# Patient Record
Sex: FEMALE | Race: WHITE | NOT HISPANIC OR LATINO | URBAN - METROPOLITAN AREA
[De-identification: names, ages, dates, MRNs, and addresses within clinical notes are randomized per-mention and may not be internally consistent; named-entity substitution may affect disease eponyms.]

---

## 2020-01-02 ENCOUNTER — INPATIENT (INPATIENT)
Facility: HOSPITAL | Age: 80
LOS: 2 days | Discharge: HOME | DRG: 690 | End: 2020-01-05
Attending: HOSPITALIST | Admitting: STUDENT IN AN ORGANIZED HEALTH CARE EDUCATION/TRAINING PROGRAM
Payer: MEDICARE

## 2020-01-02 VITALS
WEIGHT: 169.98 LBS | RESPIRATION RATE: 18 BRPM | OXYGEN SATURATION: 97 % | SYSTOLIC BLOOD PRESSURE: 106 MMHG | HEART RATE: 77 BPM | TEMPERATURE: 98 F | HEIGHT: 60 IN | DIASTOLIC BLOOD PRESSURE: 67 MMHG

## 2020-01-02 DIAGNOSIS — R26.81 UNSTEADINESS ON FEET: ICD-10-CM

## 2020-01-02 DIAGNOSIS — Z02.9 ENCOUNTER FOR ADMINISTRATIVE EXAMINATIONS, UNSPECIFIED: ICD-10-CM

## 2020-01-02 DIAGNOSIS — R42 DIZZINESS AND GIDDINESS: ICD-10-CM

## 2020-01-02 DIAGNOSIS — I63.9 CEREBRAL INFARCTION, UNSPECIFIED: ICD-10-CM

## 2020-01-02 DIAGNOSIS — E11.9 TYPE 2 DIABETES MELLITUS WITHOUT COMPLICATIONS: ICD-10-CM

## 2020-01-02 DIAGNOSIS — N39.0 URINARY TRACT INFECTION, SITE NOT SPECIFIED: ICD-10-CM

## 2020-01-02 LAB
ALBUMIN SERPL ELPH-MCNC: 4.4 G/DL — SIGNIFICANT CHANGE UP (ref 3.3–5)
ALP SERPL-CCNC: 60 U/L — SIGNIFICANT CHANGE UP (ref 40–120)
ALT FLD-CCNC: 19 U/L — SIGNIFICANT CHANGE UP (ref 10–45)
ANION GAP SERPL CALC-SCNC: 15 MMOL/L — SIGNIFICANT CHANGE UP (ref 5–17)
APPEARANCE UR: ABNORMAL
APTT BLD: 24.7 SEC — LOW (ref 27.5–36.3)
AST SERPL-CCNC: 16 U/L — SIGNIFICANT CHANGE UP (ref 10–40)
BACTERIA # UR AUTO: ABNORMAL
BASOPHILS # BLD AUTO: 0.07 K/UL — SIGNIFICANT CHANGE UP (ref 0–0.2)
BASOPHILS NFR BLD AUTO: 0.4 % — SIGNIFICANT CHANGE UP (ref 0–2)
BILIRUB SERPL-MCNC: 0.4 MG/DL — SIGNIFICANT CHANGE UP (ref 0.2–1.2)
BILIRUB UR-MCNC: NEGATIVE — SIGNIFICANT CHANGE UP
BUN SERPL-MCNC: 18 MG/DL — SIGNIFICANT CHANGE UP (ref 7–23)
CALCIUM SERPL-MCNC: 9.6 MG/DL — SIGNIFICANT CHANGE UP (ref 8.4–10.5)
CHLORIDE SERPL-SCNC: 97 MMOL/L — SIGNIFICANT CHANGE UP (ref 96–108)
CO2 SERPL-SCNC: 24 MMOL/L — SIGNIFICANT CHANGE UP (ref 22–31)
COLOR SPEC: ABNORMAL
CREAT SERPL-MCNC: 0.59 MG/DL — SIGNIFICANT CHANGE UP (ref 0.5–1.3)
DIFF PNL FLD: ABNORMAL
EOSINOPHIL # BLD AUTO: 0.04 K/UL — SIGNIFICANT CHANGE UP (ref 0–0.5)
EOSINOPHIL NFR BLD AUTO: 0.3 % — SIGNIFICANT CHANGE UP (ref 0–6)
EPI CELLS # UR: SIGNIFICANT CHANGE UP
GLUCOSE SERPL-MCNC: 291 MG/DL — HIGH (ref 70–99)
GLUCOSE UR QL: ABNORMAL
HCT VFR BLD CALC: 42.5 % — SIGNIFICANT CHANGE UP (ref 34.5–45)
HGB BLD-MCNC: 13.6 G/DL — SIGNIFICANT CHANGE UP (ref 11.5–15.5)
IMM GRANULOCYTES NFR BLD AUTO: 0.6 % — SIGNIFICANT CHANGE UP (ref 0–1.5)
INR BLD: 1.1 RATIO — SIGNIFICANT CHANGE UP (ref 0.88–1.16)
KETONES UR-MCNC: NEGATIVE — SIGNIFICANT CHANGE UP
LEUKOCYTE ESTERASE UR-ACNC: ABNORMAL
LYMPHOCYTES # BLD AUTO: 12.6 % — LOW (ref 13–44)
LYMPHOCYTES # BLD AUTO: 2.01 K/UL — SIGNIFICANT CHANGE UP (ref 1–3.3)
MCHC RBC-ENTMCNC: 30.8 PG — SIGNIFICANT CHANGE UP (ref 27–34)
MCHC RBC-ENTMCNC: 32 GM/DL — SIGNIFICANT CHANGE UP (ref 32–36)
MCV RBC AUTO: 96.2 FL — SIGNIFICANT CHANGE UP (ref 80–100)
MONOCYTES # BLD AUTO: 0.32 K/UL — SIGNIFICANT CHANGE UP (ref 0–0.9)
MONOCYTES NFR BLD AUTO: 2 % — SIGNIFICANT CHANGE UP (ref 2–14)
NEUTROPHILS # BLD AUTO: 13.39 K/UL — HIGH (ref 1.8–7.4)
NEUTROPHILS NFR BLD AUTO: 84.1 % — HIGH (ref 43–77)
NITRITE UR-MCNC: NEGATIVE — SIGNIFICANT CHANGE UP
NRBC # BLD: 0 /100 WBCS — SIGNIFICANT CHANGE UP (ref 0–0)
PH UR: 6.5 — SIGNIFICANT CHANGE UP (ref 5–8)
PLATELET # BLD AUTO: 340 K/UL — SIGNIFICANT CHANGE UP (ref 150–400)
POTASSIUM SERPL-MCNC: 4.5 MMOL/L — SIGNIFICANT CHANGE UP (ref 3.5–5.3)
POTASSIUM SERPL-SCNC: 4.5 MMOL/L — SIGNIFICANT CHANGE UP (ref 3.5–5.3)
PROT SERPL-MCNC: 7.7 G/DL — SIGNIFICANT CHANGE UP (ref 6–8.3)
PROT UR-MCNC: ABNORMAL
PROTHROM AB SERPL-ACNC: 12.7 SEC — SIGNIFICANT CHANGE UP (ref 10–12.9)
RBC # BLD: 4.42 M/UL — SIGNIFICANT CHANGE UP (ref 3.8–5.2)
RBC # FLD: 13.6 % — SIGNIFICANT CHANGE UP (ref 10.3–14.5)
RBC CASTS # UR COMP ASSIST: >50 /HPF — SIGNIFICANT CHANGE UP (ref 0–4)
SODIUM SERPL-SCNC: 136 MMOL/L — SIGNIFICANT CHANGE UP (ref 135–145)
SP GR SPEC: 1.02 — SIGNIFICANT CHANGE UP (ref 1.01–1.02)
UROBILINOGEN FLD QL: NEGATIVE — SIGNIFICANT CHANGE UP
WBC # BLD: 15.93 K/UL — HIGH (ref 3.8–10.5)
WBC # FLD AUTO: 15.93 K/UL — HIGH (ref 3.8–10.5)
WBC UR QL: >50

## 2020-01-02 PROCEDURE — 70496 CT ANGIOGRAPHY HEAD: CPT | Mod: 26

## 2020-01-02 PROCEDURE — 70498 CT ANGIOGRAPHY NECK: CPT | Mod: 26

## 2020-01-02 PROCEDURE — 93010 ELECTROCARDIOGRAM REPORT: CPT

## 2020-01-02 PROCEDURE — 99284 EMERGENCY DEPT VISIT MOD MDM: CPT

## 2020-01-02 PROCEDURE — 99223 1ST HOSP IP/OBS HIGH 75: CPT

## 2020-01-02 RX ORDER — SODIUM CHLORIDE 9 MG/ML
1000 INJECTION, SOLUTION INTRAVENOUS
Refills: 0 | Status: DISCONTINUED | OUTPATIENT
Start: 2020-01-02 | End: 2020-01-05

## 2020-01-02 RX ORDER — INSULIN LISPRO 100/ML
VIAL (ML) SUBCUTANEOUS AT BEDTIME
Refills: 0 | Status: DISCONTINUED | OUTPATIENT
Start: 2020-01-02 | End: 2020-01-05

## 2020-01-02 RX ORDER — MECLIZINE HCL 12.5 MG
12.5 TABLET ORAL ONCE
Refills: 0 | Status: COMPLETED | OUTPATIENT
Start: 2020-01-02 | End: 2020-01-02

## 2020-01-02 RX ORDER — CEFPODOXIME PROXETIL 100 MG
100 TABLET ORAL EVERY 12 HOURS
Refills: 0 | Status: DISCONTINUED | OUTPATIENT
Start: 2020-01-02 | End: 2020-01-02

## 2020-01-02 RX ORDER — ASPIRIN/CALCIUM CARB/MAGNESIUM 324 MG
81 TABLET ORAL DAILY
Refills: 0 | Status: DISCONTINUED | OUTPATIENT
Start: 2020-01-02 | End: 2020-01-05

## 2020-01-02 RX ORDER — DEXTROSE 50 % IN WATER 50 %
12.5 SYRINGE (ML) INTRAVENOUS ONCE
Refills: 0 | Status: DISCONTINUED | OUTPATIENT
Start: 2020-01-02 | End: 2020-01-05

## 2020-01-02 RX ORDER — DEXTROSE 50 % IN WATER 50 %
25 SYRINGE (ML) INTRAVENOUS ONCE
Refills: 0 | Status: DISCONTINUED | OUTPATIENT
Start: 2020-01-02 | End: 2020-01-05

## 2020-01-02 RX ORDER — ACETAMINOPHEN 500 MG
650 TABLET ORAL ONCE
Refills: 0 | Status: COMPLETED | OUTPATIENT
Start: 2020-01-02 | End: 2020-01-02

## 2020-01-02 RX ORDER — DEXTROSE 50 % IN WATER 50 %
15 SYRINGE (ML) INTRAVENOUS ONCE
Refills: 0 | Status: DISCONTINUED | OUTPATIENT
Start: 2020-01-02 | End: 2020-01-05

## 2020-01-02 RX ORDER — MECLIZINE HCL 12.5 MG
25 TABLET ORAL EVERY 6 HOURS
Refills: 0 | Status: DISCONTINUED | OUTPATIENT
Start: 2020-01-02 | End: 2020-01-05

## 2020-01-02 RX ORDER — CEFTRIAXONE 500 MG/1
1000 INJECTION, POWDER, FOR SOLUTION INTRAMUSCULAR; INTRAVENOUS EVERY 24 HOURS
Refills: 0 | Status: DISCONTINUED | OUTPATIENT
Start: 2020-01-03 | End: 2020-01-05

## 2020-01-02 RX ORDER — ATORVASTATIN CALCIUM 80 MG/1
40 TABLET, FILM COATED ORAL AT BEDTIME
Refills: 0 | Status: DISCONTINUED | OUTPATIENT
Start: 2020-01-02 | End: 2020-01-05

## 2020-01-02 RX ORDER — ENOXAPARIN SODIUM 100 MG/ML
40 INJECTION SUBCUTANEOUS DAILY
Refills: 0 | Status: DISCONTINUED | OUTPATIENT
Start: 2020-01-02 | End: 2020-01-05

## 2020-01-02 RX ORDER — GLUCAGON INJECTION, SOLUTION 0.5 MG/.1ML
1 INJECTION, SOLUTION SUBCUTANEOUS ONCE
Refills: 0 | Status: DISCONTINUED | OUTPATIENT
Start: 2020-01-02 | End: 2020-01-05

## 2020-01-02 RX ORDER — ONDANSETRON 8 MG/1
4 TABLET, FILM COATED ORAL ONCE
Refills: 0 | Status: COMPLETED | OUTPATIENT
Start: 2020-01-02 | End: 2020-01-02

## 2020-01-02 RX ORDER — CEFTRIAXONE 500 MG/1
1000 INJECTION, POWDER, FOR SOLUTION INTRAMUSCULAR; INTRAVENOUS ONCE
Refills: 0 | Status: COMPLETED | OUTPATIENT
Start: 2020-01-02 | End: 2020-01-02

## 2020-01-02 RX ORDER — INSULIN LISPRO 100/ML
VIAL (ML) SUBCUTANEOUS
Refills: 0 | Status: DISCONTINUED | OUTPATIENT
Start: 2020-01-02 | End: 2020-01-05

## 2020-01-02 RX ORDER — ACETAMINOPHEN 500 MG
650 TABLET ORAL EVERY 6 HOURS
Refills: 0 | Status: DISCONTINUED | OUTPATIENT
Start: 2020-01-02 | End: 2020-01-05

## 2020-01-02 RX ADMIN — ONDANSETRON 4 MILLIGRAM(S): 8 TABLET, FILM COATED ORAL at 15:32

## 2020-01-02 RX ADMIN — CEFTRIAXONE 100 MILLIGRAM(S): 500 INJECTION, POWDER, FOR SOLUTION INTRAMUSCULAR; INTRAVENOUS at 20:19

## 2020-01-02 RX ADMIN — Medication 12.5 MILLIGRAM(S): at 15:32

## 2020-01-02 RX ADMIN — Medication 100 MILLIGRAM(S): at 18:27

## 2020-01-02 NOTE — H&P ADULT - NSHPPHYSICALEXAM_GEN_ALL_CORE
T(C): 36.6 (01-02-20 @ 18:29), Max: 36.6 (01-02-20 @ 18:29)  HR: 77 (01-02-20 @ 18:29) (77 - 82)  BP: 128/67 (01-02-20 @ 18:29) (106/67 - 128/67)  RR: 16 (01-02-20 @ 18:29) (16 - 18)  SpO2: 96% (01-02-20 @ 18:29) (96% - 97%)    Gen: (fe)male in NAD, appears comfortable, no diaphoresis  HEENT: NCAT, MMM, neck soft and supple  CV: +S1/S2, no m/r/g  Resp: CTAB, no w/r/r  GI: normoactive BS, soft, NTND, no rebounding/guarding  Ext: No LE edema, extremities appear warm and well perfused   Neuro: AOx3, no focal deficits, CNII-XII grossly intact  Psych: No SI/HI/AVH, appropriate affect  Skin: no petechiae, ecchymosis or maculopapular rash noted T(C): 36.6 (01-02-20 @ 18:29), Max: 36.6 (01-02-20 @ 18:29)  HR: 77 (01-02-20 @ 18:29) (77 - 82)  BP: 128/67 (01-02-20 @ 18:29) (106/67 - 128/67)  RR: 16 (01-02-20 @ 18:29) (16 - 18)  SpO2: 96% (01-02-20 @ 18:29) (96% - 97%)    Gen: female in NAD, appears comfortable, no diaphoresis  HEENT: NCAT, MMM, neck soft and supple  CV: +S1/S2, no m/r/g  Resp: CTAB, no w/r/r  GI: normoactive BS, soft, NTND, no rebounding/guarding  Ext: No LE edema, extremities appear warm and well perfused   Neuro: AOx3, no focal deficits, CNII-XII grossly intact, strength 5/5 throughout, FTN intact, HTS intact  Psych: No SI/HI/AVH, appropriate affect  Skin: no petechiae, ecchymosis or maculopapular rash noted T(C): 36.6 (01-02-20 @ 18:29), Max: 36.6 (01-02-20 @ 18:29)  HR: 77 (01-02-20 @ 18:29) (77 - 82)  BP: 128/67 (01-02-20 @ 18:29) (106/67 - 128/67)  RR: 16 (01-02-20 @ 18:29) (16 - 18)  SpO2: 96% (01-02-20 @ 18:29) (96% - 97%)    Gen: female in NAD, appears comfortable, no diaphoresis  HEENT: NCAT, MMM, neck soft and supple  CV: +S1/S2, no m/r/g  Resp: CTAB, no w/r/r  GI: normoactive BS, soft, NTND, no rebounding/guarding  Ext: No LE edema, extremities appear warm and well perfused   Neuro: AOx3, no focal deficits, CNII-XII grossly intact, strength 5/5 throughout, FTN intact, HTS intact; Ric-Hallpike negative  Psych: No SI/HI/AVH, appropriate affect  Skin: no petechiae, ecchymosis or maculopapular rash noted

## 2020-01-02 NOTE — ED ADULT NURSE NOTE - OBJECTIVE STATEMENT
79y old female PMH Diabetes, Vertigo, HLD walk in from triage c/o dizziness. Patient states last night she was walking to bathroom when she felt like someone pushed her, she went against the wall and slide to the floor, denies LOC, head injury, then while on the floor patient became dizzy and unable to stand up, this morning woke up with right sided HA. Patient denies numbness/tingling, weakness, vomiting, fever/chills, CP, SOB, blurred vision/visual changes. Patient is well appearing, A&Ox3, PERRL, EOM intact, no drift present, sensory intact, equal strength present b/t in all extremities.

## 2020-01-02 NOTE — ED ADULT NURSE REASSESSMENT NOTE - NS ED NURSE REASSESS COMMENT FT1
Patient lying in bed with granddaughter at bedside, denies current dizziness, aware of plan of care, antibiotic administered as per MD order.

## 2020-01-02 NOTE — CONSULT NOTE ADULT - SUBJECTIVE AND OBJECTIVE BOX
HPI: 78yo woman history of HLD, DMII, ?meningioma resection  presenting to [facility] ED after a fall with Neurology consult for age indeterminant infarct on CT and patient's c/o unsteady gait. Patient and daughter give history. Patient reports that 11:30p 20 she was walking to bathroom when she suddenly fell backwards, hit her head against the door, slid down, had trouble getting up, and reported worsening of her gait. Of note, she reports that when she was in [place] in 2019, she had sudden onset room spinning, had a CT done in [place], and was told she had some ?hypodensity in the back of her head and received physical therapy while there. Since 2019, she is noted to require holding onto the wall intermittently for maintaining balance, or holding hand of family member to walk around, and does not walk around as much anymore because of it. During most recent episode, patient denies headaches, visual changes, slurred speech, room spinning dizziness, trouble swallowing, ringing in her ears, hearing loss, weakness, numbness/tingling, loss of consciousness, urinary incontinence. Denies recent changes in medications.    Meds:  Metformin 500mg daily  glipizide 5mg daily  lipitor 10mg daily  does not take aspirin    (Stroke only)  NIHSS: 0  MRS: 4    REVIEW OF SYSTEMS  General: endorses having had a cold 1 week ago, denies n/v/d, sob/cp, dysuria	    A 10-system ROS was performed and is negative except for those items noted above and/or in the HPI.    PAST MEDICAL & SURGICAL HISTORY:  Diabetes  Hyperlipidemia  Headache: undiagnosed for years- exacerbation treated with Prednisone  Nephrolithiasis  Brain Tumor (Benign)  Brain Tumor (Benign)    FAMILY HISTORY:  Father had CAD    SOCIAL HISTORY:   T/E/D: remote history of smoking quit in , no h/o drinking alcohol    MEDICATIONS (HOME):  Home Medications:  atorvastatin:  orally  (2015 11:47)  Janumet:  orally  (2015 11:47)    MEDICATIONS  (STANDING):    MEDICATIONS  (PRN):    ALLERGIES/INTOLERANCES:  Allergies  penicillin (Unknown)  penicillins (Rash)    VITALS & EXAMINATION:  Vital Signs Last 24 Hrs  T(C): 36.6 (2020 18:29), Max: 36.6 (2020 18:29)  T(F): 97.8 (2020 18:29), Max: 97.8 (2020 18:29)  HR: 77 (2020 18:29) (77 - 82)  BP: 128/67 (2020 18:29) (106/67 - 128/67)  BP(mean): --  RR: 16 (2020 18:29) (16 - 18)  SpO2: 96% (2020 18:29) (96% - 97%)    Neurological (>12):  MS: Awake, alert, oriented to person, place, situation, time. Normal affect. Follows all commands.    Language: Speech is clear, fluent with good repetition & comprehension    CNs: PERRLA (R = 3mm, L = 3mm). VFF. EOMI no nystagmus, no diplopia. V1-3 intact to LT/pinprick, well developed masseter muscles b/l. No facial asymmetry b/l, full eye closure strength b/l. Hearing grossly normal (rubbing fingers) b/l. Symmetric palate elevation in midline. Gag reflex deferred. Head turning & shoulder shrug intact b/l. Tongue midline, normal movements, no atrophy.     No vertical nystagmus    Motor: Normal muscle bulk & tone. No noticeable tremor. No pronator nor downward drift.	     Sensation: Intact to LT b/l throughout.     Cortical: Extinction on DSS (neglect): none    Coordination: intact rapid-alt movements. No dysmetria to FTN/HTS    Gait: patient can stand up on her own from sitting position, however she is noted to have mildly wide based gait and requires hand holding to take steps forward    LABORATORY:  CBC                       13.6   15.93 )-----------( 340      ( 2020 15:43 )             42.5     Chem 01-02    136  |  97  |  18  ----------------------------<  291<H>  4.5   |  24  |  0.59    Ca    9.6      2020 15:43    TPro  7.7  /  Alb  4.4  /  TBili  0.4  /  DBili  x   /  AST  16  /  ALT  19  /  AlkPhos  60  01-02    LFTs LIVER FUNCTIONS - ( 2020 15:43 )  Alb: 4.4 g/dL / Pro: 7.7 g/dL / ALK PHOS: 60 U/L / ALT: 19 U/L / AST: 16 U/L / GGT: x           Coagulopathy PT/INR - ( 2020 15:43 )   PT: 12.7 sec;   INR: 1.10 ratio         PTT - ( 2020 15:43 )  PTT:24.7 sec  Lipid Panel   A1c   Cardiac enzymes     U/A Urinalysis Basic - ( 2020 16:06 )    Color: Light Orange / Appearance: Turbid / S.019 / pH: x  Gluc: x / Ketone: Negative  / Bili: Negative / Urobili: Negative   Blood: x / Protein: 30 mg/dL / Nitrite: Negative   Leuk Esterase: Large / RBC: >50 /hpf / WBC >50   Sq Epi: x / Non Sq Epi: Few / Bacteria: Many    STUDIES & IMAGING:  Studies (EKG, EEG, EMG, etc):     Radiology (XR, CT, MR, U/S, TTE/SONDRA):    < from: CT Head No Cont (20 @ 17:44) >  IMPRESSION:  CT brain: Subcentimeter hypodensities are present within the right cerebellum and are concerning for age indeterminant lacunar infarcts. No acute hemorrhage.    CTA neck: No stenosis. No dissection.    CTA brain: No stenosis or aneurysm.    < end of copied text > HPI: 78yo woman history of HLD, DMII, ?meningioma resection  presenting to [facility] ED after a fall with Neurology consult for age indeterminant infarct on CT and patient's c/o unsteady gait. Patient and daughter give history. Patient reports that 11:30p 20 she was walking to bathroom when she suddenly fell backwards, hit her head against the door, slid down, had trouble getting up, and reported worsening of her gait. Of note, she reports that when she was in [place] in 2019, she had sudden onset room spinning, had a CT done in [place], and was told she had some ?hypodensity in the back of her head and received physical therapy while there. Since 2019, she is noted to require holding onto the wall intermittently for maintaining balance, or holding hand of family member to walk around, and does not walk around as much anymore because of it. During most recent episode, patient denies headaches, visual changes, slurred speech, room spinning dizziness, trouble swallowing, ringing in her ears, hearing loss, weakness, numbness/tingling, loss of consciousness, urinary incontinence. Denies recent changes in medications.    Patient also notes that she has had pain behind her right eye for the past week with no associated visual changes, does not hurt with eye movements, no temporal tenderness.     Meds:  Metformin 500mg daily  glipizide 5mg daily  lipitor 10mg daily  does not take aspirin    (Stroke only)  NIHSS: 0  MRS: 4    REVIEW OF SYSTEMS  General: endorses having had a cold 1 week ago, denies n/v/d, sob/cp, dysuria	    A 10-system ROS was performed and is negative except for those items noted above and/or in the HPI.    PAST MEDICAL & SURGICAL HISTORY:  Diabetes  Hyperlipidemia  Headache: undiagnosed for years- exacerbation treated with Prednisone  Nephrolithiasis  Brain Tumor (Benign)  Brain Tumor (Benign)    FAMILY HISTORY:  Father had CAD    SOCIAL HISTORY:   T/E/D: remote history of smoking quit in , no h/o drinking alcohol    MEDICATIONS (HOME):  Home Medications:  atorvastatin:  orally  (2015 11:47)  Janumet:  orally  (2015 11:47)    MEDICATIONS  (STANDING):    MEDICATIONS  (PRN):    ALLERGIES/INTOLERANCES:  Allergies  penicillin (Unknown)  penicillins (Rash)    VITALS & EXAMINATION:  Vital Signs Last 24 Hrs  T(C): 36.6 (2020 18:29), Max: 36.6 (2020 18:29)  T(F): 97.8 (2020 18:29), Max: 97.8 (2020 18:29)  HR: 77 (2020 18:29) (77 - 82)  BP: 128/67 (2020 18:29) (106/67 - 128/67)  BP(mean): --  RR: 16 (2020 18:29) (16 - 18)  SpO2: 96% (2020 18:29) (96% - 97%)    Neurological (>12):  MS: Awake, alert, oriented to person, place, situation, time. Normal affect. Follows all commands.    Language: Speech is clear, fluent with good repetition & comprehension    CNs: PERRLA (R = 3mm, L = 3mm). VFF. EOMI no nystagmus, no diplopia. V1-3 intact to LT/pinprick, well developed masseter muscles b/l. No facial asymmetry b/l, full eye closure strength b/l. Hearing grossly normal (rubbing fingers) b/l. Symmetric palate elevation in midline. Gag reflex deferred. Head turning & shoulder shrug intact b/l. Tongue midline, normal movements, no atrophy.     No vertical nystagmus    Motor: Normal muscle bulk & tone. No noticeable tremor. No pronator nor downward drift.	     Sensation: Intact to LT b/l throughout.     Cortical: Extinction on DSS (neglect): none    Coordination: intact rapid-alt movements. No dysmetria to FTN/HTS    Gait: patient can stand up on her own from sitting position, however she is noted to have mildly wide based gait and requires hand holding to take steps forward    LABORATORY:  CBC                       13.6   15.93 )-----------( 340      ( 2020 15:43 )             42.5     Chem 01-02    136  |  97  |  18  ----------------------------<  291<H>  4.5   |  24  |  0.59    Ca    9.6      2020 15:43    TPro  7.7  /  Alb  4.4  /  TBili  0.4  /  DBili  x   /  AST  16  /  ALT  19  /  AlkPhos  60  -    LFTs LIVER FUNCTIONS - ( 2020 15:43 )  Alb: 4.4 g/dL / Pro: 7.7 g/dL / ALK PHOS: 60 U/L / ALT: 19 U/L / AST: 16 U/L / GGT: x           Coagulopathy PT/INR - ( 2020 15:43 )   PT: 12.7 sec;   INR: 1.10 ratio         PTT - ( 2020 15:43 )  PTT:24.7 sec  Lipid Panel   A1c   Cardiac enzymes     U/A Urinalysis Basic - ( 2020 16:06 )    Color: Light Orange / Appearance: Turbid / S.019 / pH: x  Gluc: x / Ketone: Negative  / Bili: Negative / Urobili: Negative   Blood: x / Protein: 30 mg/dL / Nitrite: Negative   Leuk Esterase: Large / RBC: >50 /hpf / WBC >50   Sq Epi: x / Non Sq Epi: Few / Bacteria: Many    STUDIES & IMAGING:  Studies (EKG, EEG, EMG, etc):     Radiology (XR, CT, MR, U/S, TTE/SONDRA):    < from: CT Head No Cont (20 @ 17:44) >  IMPRESSION:  CT brain: Subcentimeter hypodensities are present within the right cerebellum and are concerning for age indeterminant lacunar infarcts. No acute hemorrhage.    CTA neck: No stenosis. No dissection.    CTA brain: No stenosis or aneurysm.    < end of copied text >

## 2020-01-02 NOTE — H&P ADULT - HISTORY OF PRESENT ILLNESS
79F with PMHx of T2DM, meningioma (post-resection decades prior), prior CVA, and BPPV presents with one episode of vertigo one day prior to admission. Patient states approximately one day prior she was walking to the bathroom when she felt a sudden onset of vertigo which she described as room spinning. She stated that it lasted approximately 15 minutes and self-resolved. When it occurred she denied sudden head movement. She lowered herself onto the floor with her back against the wall. She felt slightly nauseous during the episode, but denied any vomiting. She denies changes in hearing or ringing of her ears. Since then she states that her gait as been unsteady and wide-based with the constant need to brace herself despite not having active vertigo. She states that she was diagnosed with vertigo several months prior in [place] and treated with a maneuver. Per discussion with patient and her descriptions it seems as if she was diagnosed with BPPV and had the Ric-Epperson [place] maneuver performed with treatment being two sessions of the Epley maneuver. At that time patient also diagnosed with "something in the brain," and what she describes sounds like a stroke for which she was told she needed follow up. She has not had an MRI in the interim. Patient denies nasal congestion, rhinorrhea, cough, or shortness  of breath. She has been having dysuria for several days. She denies polyuria or abdominal pain. She has been taking antibiotics she received from [facility], but unsure of the exact class of antibiotics. She states that her dysuria has not improved despite taking it for several days. She is presenting today because she is concerned about the vertigo. Aside from unsteadiness on feet, patient denies any active vertigo currently. While in the ED patient had CT head which showed sub-centimeter hypodensity in right cerebellum. Neurology was consulted, likely chronic and believe patient may have recrudescence of prior stroke given UTI.

## 2020-01-02 NOTE — H&P ADULT - PROBLEM SELECTOR PLAN 2
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 peripheral vertigo  -Physical therapy consult  -Treat UTI  -Defer inpatient work up of stroke (TTE and MRI brain w/o contrast) pending clinical course; EKG showing NSR, no telemetry for now, consider outpatient cardiac monitor  -Patient denies headache or changes in vision, suspicion for temporal arteritis is low, defer ESR/CRP  -Ambulate with assistance -Possible recrudescence of old cerebellar stroke vs. peripheral vertigo  -Physical therapy consult  -Treat UTI  -Defer inpatient work up of stroke (TTE and MRI brain w/o contrast) pending clinical course; EKG showing NSR, no telemetry for now, consider outpatient cardiac monitor, no LVO on CTA H&N  -Patient denies headache or changes in vision, suspicion for temporal arteritis is low, defer ESR/CRP  -Ambulate with assistance

## 2020-01-02 NOTE — ED PROVIDER NOTE - ATTENDING CONTRIBUTION TO CARE
79 YOF PMH DM, vertigo presents to ED c/o dizziness and unsteadiness on her feet since last night. Was ambulating to bathroom last night when she felt off balance and stumbled hitting door. She reports lowering herself to ground feeling sensation of room spinning. Did not have LOC. Able to get back into bed on her own. reports history of similar symptoms for which she was treated with vestibular PT back in [facility] with resolution of symptoms. Here still with mild headache and dizziness upon standing. Denies use of blood thinners. Denies fevers, chills, n/v, blurry vision, cp, sob, abd pain, numbness, tingling.   AP - nonfocal neuro exam. ekg nonischemic. eval for infectious etiology. lower concern for posterior stroke. possible orthostatic or vasovagal. CTH r/o traumatic injury. reassess

## 2020-01-02 NOTE — ED PROVIDER NOTE - OBJECTIVE STATEMENT
79 year old female with pmhx DM, prior vertigo presents to ED c/o dizziness and unsteadiness on her feet since last night. Patient reports she was attempting to walk to bathroom she suddenly felt off-balance and stumbled stepping backwards until her back hit the wall. Patient states she slid down lowering herself to the floor and began to feel dizzy describing the room was spinning. Patient had trouble getting up and states it took about an hour to stand up on her own. Since has had intermittent sensation of room spinning assocated w/ nausea. Symptoms worsen when pt lays flat and closes her eyes. She states it feels similar to vertigo she experienced in [place] which she was treated with vestibular pt. She endorses mild left sided headache. Denies head injury, loc, chest pain, sob, abd pain, v/d.

## 2020-01-02 NOTE — H&P ADULT - PROBLEM SELECTOR PLAN 3
-Start ASA81 for secondary prevention  -Increase Lipitor to high dose  -Send A1c and Lipid Profile for risk stratification -Start ASA81 for secondary prevention  -Increase Lipitor to high dose  -Send A1c and Lipid Profile for risk stratification  -Defer further inpatient stroke work up pending clinical course

## 2020-01-02 NOTE — ED PROVIDER NOTE - PHYSICAL EXAMINATION
CONSTITUTIONAL: Patient is awake, alert and oriented x 3. Patient is well appearing and in no acute distress.  HEAD: NCAT,   EYES: PERRL b/l, EOMI, No nystagmus   ENT:Airway patent, Nasal mucosa clear. Mouth with normal mucosa. Throat has no vesicles, no oropharyngeal exudates and uvula is midline.  LUNGS: CTA B/L,  HEART: RRR.+S1S2 no murmurs,   ABDOMEN: Soft nd/nt+bs no rebound or guarding.   EXTREMITY: no edema or calf tenderness b/l, FROM upper and lower ext b/l  SKIN: with no rash or lesions.   NEURO: Cn3-12 grossly intact. Strength5/5UE/LE.NmlSensation. Normal finger to nose. No drift

## 2020-01-02 NOTE — H&P ADULT - PROBLEM SELECTOR PLAN 6
Transitions of Care Status:  1.  Name of PCP: Carlos Fuentes  2.  PCP Contacted on Admission: [ ] Y    [ ] N    3.  PCP contacted at Discharge: [ ] Y    [ ] N    [ ] N/A  4.  Post-Discharge Appointment Date and Location:  5.  Summary of Handoff given to PCP:

## 2020-01-02 NOTE — H&P ADULT - NSICDXPASTMEDICALHX_GEN_ALL_CORE_FT
PAST MEDICAL HISTORY:  Brain Tumor (Benign)     Diabetes     Headache undiagnosed for years- exacerbation treated with Prednisone    Hyperlipidemia     Ischemic stroke     Nephrolithiasis

## 2020-01-02 NOTE — H&P ADULT - PROBLEM SELECTOR PLAN 1
Transitions of Care Status:  1.  Name of PCP: Carlos Fuentes  2.  PCP Contacted on Admission: [ ] Y    [ ] N    3.  PCP contacted at Discharge: [ ] Y    [ ] N    [ ] N/A  4.  Post-Discharge Appointment Date and Location:  5.  Summary of Handoff given to PCP: -Patient taking unknown antibiotic class she got from [facility] several months prior without improvement, will try to find out what it is  -Continue with Ceftriaxone (patient with PCN allergy which is rash, tolerated CTX in ED)  -Collect urine culture if possible

## 2020-01-02 NOTE — CONSULT NOTE ADULT - ASSESSMENT
Assessment:  78yo woman history of HLD, DMII, ?meningioma resection 1978 presenting to [facility] ED after a fall with Neurology consult for age indeterminant infarct on CT and patient's c/o unsteady gait. Neurological examination demonstrates mildly wide based gait and difficulty with balance which has been progressive. CTH shows age indeterminant R cerebellar infarct.     Impression: recrudescence of known cerebellar infarct in setting of patient having recent cold    Recommendations:  admit to medicine  [ ] MRI brain w/o contrast  [ ] PT/OT  [ ] speech/swallow  [ ] asp 81mg daily  [ ] atorvastatin 80mg daily to titrate for LDL < 70  [ ] no further stroke workup at this time needed inpatient  [ ] no need for permissive HTN    We will follow closely    -Management & disposition to be discussed with Dr. Pineda Assessment:  78yo woman history of HLD, DMII, ?meningioma resection 1978 presenting to [facility] ED after a fall with Neurology consult for age indeterminant infarct on CT and patient's c/o unsteady gait. Neurological examination demonstrates mildly wide based gait and difficulty with balance which has been progressive. CTH shows age indeterminant R cerebellar infarct.     Impression: recrudescence of known cerebellar infarct in setting of patient having recent cold and likely UTI per UA    Recommendations:  admit to medicine  [ ] MRI brain w/o contrast which can be done as outpatient  [ ] PT/OT  [ ] speech/swallow  [ ] asp 81mg daily  [ ] atorvastatin 80mg daily to titrate for LDL < 70  [ ] no further stroke workup at this time needed inpatient  [ ] no need for permissive HTN  [ ] noted elevated white count and + UA leuk esterase - mgmt per primary team    We will follow closely    -Management & disposition to be discussed with Dr. Pineda Assessment:  80yo woman history of HLD, DMII, ?meningioma resection 1978 presenting to [facility] ED after a fall with Neurology consult for age indeterminant infarct on CT and patient's c/o unsteady gait. Neurological examination demonstrates mildly wide based gait and difficulty with balance which has been progressive. CTH shows age indeterminant R cerebellar infarct.     Impression: recrudescence of known cerebellar infarct in setting of patient having recent cold and likely UTI per UA  rule out temporal arteritis though low suspicion, given ?improving retrobulbar eye pain    Recommendations:  admit to medicine  [ ] please add on ESR & CRP  [ ] RVP  [ ] MRI brain w/o contrast which can be done as outpatient  [ ] PT/OT  [ ] speech/swallow  [ ] asp 81mg daily  [ ] atorvastatin 80mg daily to titrate for LDL < 70  [ ] no further stroke workup at this time needed inpatient  [ ] no need for permissive HTN  [ ] noted elevated white count and + UA leuk esterase - mgmt per primary team  [ ] optho consult     We will follow closely    -Management & disposition to be discussed with Dr. Pineda

## 2020-01-02 NOTE — H&P ADULT - ASSESSMENT
79F with PMHx of T2DM, meningioma (post-resection decades prior), prior CVA, and BPPV presents with one episode of vertigo one day prior to admission. Patient also having dysuria for several days not improving on antibiotics she has been taking on her own which she received from [facility] months prior. CT head showing sub-centimeter hypodensity of right cerebellum (age indeterminate). Patient with grossly positive UA (>50 WBC, +Bact, +LE). Exam notable for intact neurological exam with [place]-Hallpike maneuver negative (though limited by patient positioning). Given patient's history her subcentimeter hypodensity in cerebellum likely chronic. She has a UTI by history and supported by UA. There is a possibility that she is experiencing a recrudescence of her prior stroke. Patient explanation of her vertigo sounds typical for peripheral vertigo (consistent with her prior BPPV) with low suspicion for central vertigo at this time. Patient admitted for treatment of UTI.

## 2020-01-02 NOTE — H&P ADULT - PROBLEM SELECTOR PLAN 4
-Possibly BPPV based on patient description of [place]-Hallpike and Epley maneuver in [place]; Ric-Hallpike negative when performed by me, but limited by logistics of room  -Low suspicion for central vertigo, defer MRI brain  -Low suspicion for viral infection given lack of symptoms, defer RVP  -Meclizine PRN

## 2020-01-02 NOTE — PATIENT PROFILE ADULT - STATED REASON FOR ADMISSION
" Because I felt that something was wrong, I couldn't hold myself and I had to use 2 hands to stay up"

## 2020-01-02 NOTE — H&P ADULT - NSHPREVIEWOFSYSTEMS_GEN_ALL_CORE
Gen: no changes in weight, fatigue, night sweats, appetite, or fever  Eyes: no changes in vision, diplopia, or floaters  ENT: no epistaxis, sinus pain, gingival bleeding, odynophagia or dysphagia  CV: no CP, SOB, PND, orthopnea, LOC, or palpitations  Resp: no cough, wheezing, or hemoptysis  GI: no abdominal pain, dyspepsia, nausea, vomiting, diarrhea, constipation, hematemesis, hematochezia, or melena  : no dysuria, polyuria, or hematuria  MSK: no arthralgias or joint swelling   Neuro: no gross sensory changes, numbness, focal deficits  Psych: no depression, changes in sleep, changes in concentration, or lack of energy  Heme/Onc: no purpura, petechiae or night sweats  Skin: no pruritus, hair loss or skin lesions  All: no photosensitivity, no complaints of anaphylaxis (SOB, throat swelling) Gen: no changes in weight, fatigue, night sweats, appetite, or fever  Eyes: no changes in vision, diplopia, or floaters  ENT: no epistaxis, sinus pain, gingival bleeding, odynophagia or dysphagia  CV: no CP, SOB, PND, orthopnea, LOC, or palpitations  Resp: no cough, wheezing, or hemoptysis  GI: no abdominal pain, dyspepsia, nausea, vomiting, diarrhea, constipation, hematemesis, hematochezia, or melena  : +dysuria, no hematuria  MSK: no arthralgias or joint swelling   Neuro: no gross sensory changes, numbness, focal deficits; +vertigo  Psych: no depression, changes in sleep, changes in concentration, or lack of energy  Heme/Onc: no purpura, petechiae or night sweats  Skin: no pruritus, hair loss or skin lesions  All: no photosensitivity, no complaints of anaphylaxis (SOB, throat swelling)

## 2020-01-03 ENCOUNTER — TRANSCRIPTION ENCOUNTER (OUTPATIENT)
Age: 80
End: 2020-01-03

## 2020-01-03 LAB
ANION GAP SERPL CALC-SCNC: 14 MMOL/L — SIGNIFICANT CHANGE UP (ref 5–17)
BUN SERPL-MCNC: 16 MG/DL — SIGNIFICANT CHANGE UP (ref 7–23)
CALCIUM SERPL-MCNC: 9.9 MG/DL — SIGNIFICANT CHANGE UP (ref 8.4–10.5)
CHLORIDE SERPL-SCNC: 101 MMOL/L — SIGNIFICANT CHANGE UP (ref 96–108)
CHOLEST SERPL-MCNC: 203 MG/DL — HIGH (ref 10–199)
CO2 SERPL-SCNC: 26 MMOL/L — SIGNIFICANT CHANGE UP (ref 22–31)
CREAT SERPL-MCNC: 0.65 MG/DL — SIGNIFICANT CHANGE UP (ref 0.5–1.3)
GLUCOSE BLDC GLUCOMTR-MCNC: 147 MG/DL — HIGH (ref 70–99)
GLUCOSE BLDC GLUCOMTR-MCNC: 160 MG/DL — HIGH (ref 70–99)
GLUCOSE BLDC GLUCOMTR-MCNC: 231 MG/DL — HIGH (ref 70–99)
GLUCOSE SERPL-MCNC: 96 MG/DL — SIGNIFICANT CHANGE UP (ref 70–99)
HBA1C BLD-MCNC: 6.9 % — HIGH (ref 4–5.6)
HCT VFR BLD CALC: 40.6 % — SIGNIFICANT CHANGE UP (ref 34.5–45)
HDLC SERPL-MCNC: 71 MG/DL — SIGNIFICANT CHANGE UP
HGB BLD-MCNC: 12.7 G/DL — SIGNIFICANT CHANGE UP (ref 11.5–15.5)
LIPID PNL WITH DIRECT LDL SERPL: 113 MG/DL — HIGH
MCHC RBC-ENTMCNC: 30.2 PG — SIGNIFICANT CHANGE UP (ref 27–34)
MCHC RBC-ENTMCNC: 31.3 GM/DL — LOW (ref 32–36)
MCV RBC AUTO: 96.7 FL — SIGNIFICANT CHANGE UP (ref 80–100)
NRBC # BLD: 0 /100 WBCS — SIGNIFICANT CHANGE UP (ref 0–0)
PLATELET # BLD AUTO: 335 K/UL — SIGNIFICANT CHANGE UP (ref 150–400)
POTASSIUM SERPL-MCNC: 3.9 MMOL/L — SIGNIFICANT CHANGE UP (ref 3.5–5.3)
POTASSIUM SERPL-SCNC: 3.9 MMOL/L — SIGNIFICANT CHANGE UP (ref 3.5–5.3)
RBC # BLD: 4.2 M/UL — SIGNIFICANT CHANGE UP (ref 3.8–5.2)
RBC # FLD: 13.7 % — SIGNIFICANT CHANGE UP (ref 10.3–14.5)
SODIUM SERPL-SCNC: 141 MMOL/L — SIGNIFICANT CHANGE UP (ref 135–145)
TOTAL CHOLESTEROL/HDL RATIO MEASUREMENT: 2.9 RATIO — LOW (ref 3.3–7.1)
TRIGL SERPL-MCNC: 93 MG/DL — SIGNIFICANT CHANGE UP (ref 10–149)
WBC # BLD: 15.21 K/UL — HIGH (ref 3.8–10.5)
WBC # FLD AUTO: 15.21 K/UL — HIGH (ref 3.8–10.5)

## 2020-01-03 PROCEDURE — 70551 MRI BRAIN STEM W/O DYE: CPT | Mod: 26

## 2020-01-03 PROCEDURE — 99233 SBSQ HOSP IP/OBS HIGH 50: CPT | Mod: GC

## 2020-01-03 PROCEDURE — 71045 X-RAY EXAM CHEST 1 VIEW: CPT | Mod: 26

## 2020-01-03 RX ADMIN — Medication 81 MILLIGRAM(S): at 12:50

## 2020-01-03 RX ADMIN — ENOXAPARIN SODIUM 40 MILLIGRAM(S): 100 INJECTION SUBCUTANEOUS at 12:50

## 2020-01-03 RX ADMIN — CEFTRIAXONE 100 MILLIGRAM(S): 500 INJECTION, POWDER, FOR SOLUTION INTRAMUSCULAR; INTRAVENOUS at 20:45

## 2020-01-03 NOTE — PHYSICAL THERAPY INITIAL EVALUATION ADULT - ADDITIONAL COMMENTS
Pt states she lives alone in an apartment, no steps, +elevator access. Pt states prior to admission being independent with all functional mobility and ADLs.

## 2020-01-03 NOTE — DISCHARGE NOTE NURSING/CASE MANAGEMENT/SOCIAL WORK - PATIENT PORTAL LINK FT
You can access the FollowMyHealth Patient Portal offered by [facility] by registering at the following website: [email or web address]. By joining [employer]’s FollowMyHealth portal, you will also be able to view your health information using other applications (apps) compatible with our system.

## 2020-01-03 NOTE — PROGRESS NOTE ADULT - PROBLEM SELECTOR PLAN 2
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 peripheral vertigo  -Physical therapy consult  -Treat UTI  -Defer inpatient work up of stroke (TTE and MRI brain w/o contrast) pending clinical course; EKG showing NSR, no telemetry for now, consider outpatient cardiac monitor, no LVO on CTA H&N  -Patient denies headache or changes in vision, suspicion for temporal arteritis is low, defer ESR/CRP  -Ambulate with assistance - Possible recrudescence of old cerebellar stroke vs. peripheral vertigo. EKG showing NSR, no telemetry for now, consider outpatient cardiac monitor, no LVO on CTA H&N  - F/u MRI   - F/u PT recs  - Ambulate with assistance

## 2020-01-03 NOTE — PROGRESS NOTE ADULT - PROBLEM SELECTOR PLAN 3
-Start ASA81 for secondary prevention  -Increase Lipitor to high dose  -Send A1c and Lipid Profile for risk stratification  -Defer further inpatient stroke work up pending clinical course - C/w ASA81 for secondary prevention  - C/w Lipitor  - F/u A1c and Lipid Profile for risk stratification

## 2020-01-03 NOTE — PHYSICAL THERAPY INITIAL EVALUATION ADULT - DISCHARGE DISPOSITION, PT EVAL
Plan discussed with JUANITA Knowles and MD Fajardo. TBD pending progression with ambulation. Pt may require acute rehab as symptoms did not appear to correlate with vestibular tests. Pt lives alone, and was fully independent prior to admission. Pt may benefit from OT consult.

## 2020-01-03 NOTE — PROVIDER CONTACT NOTE (EICU) - ASSESSMENT
No s/s of distress, patient educated multiple times on use of insulin in hospital vs oral medications for hyperglycemia   patient still refuses

## 2020-01-03 NOTE — PROGRESS NOTE ADULT - PROBLEM SELECTOR PLAN 5
-Hold home metformin and sulfonylurea while inpatient  -Start FS TID AC and Insulin sliding scale - Hold home metformin and sulfonylurea while inpatient  - C/w TID AC and Insulin sliding scale

## 2020-01-03 NOTE — PHYSICAL THERAPY INITIAL EVALUATION ADULT - GENERAL OBSERVATIONS, REHAB EVAL
Pt yvonne 45 min eval well. Pt a/w dizziness, +UA, CT brain showing R cerebellar infarct. Pt rec'd in bed, agreed to session. Pt's symptoms did not appear elicited by head movements (nods, shakes, head thrusts, or [place]-hallpike).

## 2020-01-03 NOTE — PROGRESS NOTE ADULT - PROBLEM SELECTOR PLAN 4
-Possibly BPPV based on patient description of [place]-Hallpike and Epley maneuver in [place]; Ric-Hallpike negative when performed by me, but limited by logistics of room  -Low suspicion for central vertigo, defer MRI brain  -Low suspicion for viral infection given lack of symptoms, defer RVP  -Meclizine PRN - Low suspicion for central vertigo, F/u MRI brain  - Meclizine PRN

## 2020-01-03 NOTE — PHYSICAL THERAPY INITIAL EVALUATION ADULT - PERTINENT HX OF CURRENT PROBLEM, REHAB EVAL
78 y/o F with PMH of T2DM, meningioma (post-resection decades prior), prior CVA, and BPPV presents with one episode of vertigo. Pt states she was walking to the bathroom when she felt a sudden onset of vertigo, lasting approximately 15 minutes and self-resolved.  CT head showed sub-centimeter hypodensity in R cerebellum, likely chronic. Pt a/w UTI, age indeterminant R cerebellar infarct, and possible peripheral vertigo. 78 y/o F with PMH of T2DM, meningioma (post-resection decades prior), prior CVA, and BPPV presents with one episode of vertigo. Pt states she was walking to the bathroom when she felt a sudden onset of vertigo, lasting approximately 15 minutes and self-resolved.  CT head showed sub-centimeter hypodensity in R cerebellum. Pt pending brain MRI. Pt a/w UTI, age indeterminant R cerebellar infarct, and possible peripheral vertigo.

## 2020-01-03 NOTE — PROGRESS NOTE ADULT - PROBLEM SELECTOR PLAN 1
-Patient taking unknown antibiotic class she got from [facility] several months prior without improvement, will try to find out what it is  -Continue with Ceftriaxone (patient with PCN allergy which is rash, tolerated CTX in ED)  -Collect urine culture if possible - C/w Ceftriaxone (patient with PCN allergy which is rash, tolerated CTX in ED)

## 2020-01-03 NOTE — PROGRESS NOTE ADULT - SUBJECTIVE AND OBJECTIVE BOX
PROGRESS NOTE:   ************************************************  Authoted by: Mark Hellerman, MD PGY2  Internal Medicine  Pager: [facility]: [phone]; [facility]: [phone]  *************************************************    Patient is a 79y old  Female who presents with a chief complaint of Vertigo (2020 08:56)      SUBJECTIVE / OVERNIGHT EVENTS:   - The patient was seen and examined at bedside.   - No overnight events  - No vertigo, "feels drunk when I walk"   - No fever/chills/chestpain/abd pain/n/v/d      MEDICATIONS  (STANDING):  aspirin enteric coated 81 milliGRAM(s) Oral daily  atorvastatin 40 milliGRAM(s) Oral at bedtime  cefTRIAXone   IVPB 1000 milliGRAM(s) IV Intermittent every 24 hours  dextrose 5%. 1000 milliLiter(s) (50 mL/Hr) IV Continuous <Continuous>  dextrose 50% Injectable 12.5 Gram(s) IV Push once  dextrose 50% Injectable 25 Gram(s) IV Push once  dextrose 50% Injectable 25 Gram(s) IV Push once  enoxaparin Injectable 40 milliGRAM(s) SubCutaneous daily  insulin lispro (HumaLOG) corrective regimen sliding scale   SubCutaneous three times a day before meals  insulin lispro (HumaLOG) corrective regimen sliding scale   SubCutaneous at bedtime    MEDICATIONS  (PRN):  acetaminophen   Tablet .. 650 milliGRAM(s) Oral every 6 hours PRN Mild Pain (1 - 3), Moderate Pain (4 - 6)  dextrose 40% Gel 15 Gram(s) Oral once PRN Blood Glucose LESS THAN 70 milliGRAM(s)/deciliter  glucagon  Injectable 1 milliGRAM(s) IntraMuscular once PRN Glucose LESS THAN 70 milligrams/deciliter  meclizine 25 milliGRAM(s) Oral every 6 hours PRN Vertigo      CAPILLARY BLOOD GLUCOSE      POCT Blood Glucose.: 107 mg/dL (2020 08:30)  POCT Blood Glucose.: 85 mg/dL (2020 23:46)    I&O's Summary      PHYSICAL EXAM:  Vital Signs Last 24 Hrs  T(C): 36.6 (2020 04:46), Max: 36.9 (2020 22:19)  T(F): 97.8 (2020 04:46), Max: 98.5 (2020 22:19)  HR: 66 (2020 04:46) (66 - 85)  BP: 110/64 (2020 04:46) (106/67 - 134/66)  BP(mean): --  RR: 18 (2020 04:46) (16 - 18)  SpO2: 96% (2020 04:46) (94% - 97%)    Gen: female in NAD, appears comfortable, no diaphoresis  HEENT: NCAT, MMM, neck soft and supple  CV: +S1/S2, no m/r/g  Resp: CTAB, no w/r/r  GI: normoactive BS, soft, NTND, no rebounding/guarding  Ext: No LE edema, extremities appear warm and well perfused   Neuro: AOx3, no focal deficits, CNII-XII grossly intact, strength 5/5 throughout, FTN intact  Skin: no petechiae, ecchymosis or maculopapular rash noted                        12.7   15.21 )-----------( 335      ( 2020 07:02 )             40.6     01-03    141  |  101  |  16  ----------------------------<  96  3.9   |  26  |  0.65    Ca    9.9      2020 07:00    TPro  7.7  /  Alb  4.4  /  TBili  0.4  /  DBili  x   /  AST  16  /  ALT  19  /  AlkPhos  60  01-02    PT/INR - ( 2020 15:43 )   PT: 12.7 sec;   INR: 1.10 ratio         PTT - ( 2020 15:43 )  PTT:24.7 sec      Urinalysis Basic - ( 2020 16:06 )    Color: Light Orange / Appearance: Turbid / S.019 / pH: x  Gluc: x / Ketone: Negative  / Bili: Negative / Urobili: Negative   Blood: x / Protein: 30 mg/dL / Nitrite: Negative   Leuk Esterase: Large / RBC: >50 /hpf / WBC >50   Sq Epi: x / Non Sq Epi: Few / Bacteria: Many

## 2020-01-03 NOTE — CHART NOTE - NSCHARTNOTEFT_GEN_A_CORE
Patient found to have R. cerebellar infarct on CTH. Stroke team will evalute patient once MRI brain is done. Continue ASA.

## 2020-01-03 NOTE — DISCHARGE NOTE NURSING/CASE MANAGEMENT/SOCIAL WORK - NSDCPEPTSTRK_GEN_ALL_CORE
Stroke warning signs and symptoms/Signs and symptoms of stroke/Prescribed medications/Need for follow up after discharge/Risk factors for stroke/Stroke education booklet/Call 911 for stroke/Stroke support groups for patients, families, and friends

## 2020-01-03 NOTE — PROGRESS NOTE ADULT - ASSESSMENT
79F with PMHx of T2DM, meningioma (post-resection decades prior), prior CVA, and BPPV presents with one episode of vertigo one day prior to admission. Patient also having dysuria for several days not improving on antibiotics she has been taking on her own which she received from [facility] months prior. CT head showing sub-centimeter hypodensity of right cerebellum (age indeterminate). Patient with grossly positive UA (>50 WBC, +Bact, +LE). Exam notable for intact neurological exam with [place]-Hallpike maneuver negative (though limited by patient positioning). Given patient's history her subcentimeter hypodensity in cerebellum likely chronic. She has a UTI by history and supported by UA. There is a possibility that she is experiencing a recrudescence of her prior stroke. Patient explanation of her vertigo sounds typical for peripheral vertigo (consistent with her prior BPPV) with low suspicion for central vertigo at this time. Patient admitted for treatment of UTI.

## 2020-01-04 DIAGNOSIS — Z29.9 ENCOUNTER FOR PROPHYLACTIC MEASURES, UNSPECIFIED: ICD-10-CM

## 2020-01-04 LAB
ANION GAP SERPL CALC-SCNC: 15 MMOL/L — SIGNIFICANT CHANGE UP (ref 5–17)
BUN SERPL-MCNC: 18 MG/DL — SIGNIFICANT CHANGE UP (ref 7–23)
CALCIUM SERPL-MCNC: 9.2 MG/DL — SIGNIFICANT CHANGE UP (ref 8.4–10.5)
CHLORIDE SERPL-SCNC: 102 MMOL/L — SIGNIFICANT CHANGE UP (ref 96–108)
CO2 SERPL-SCNC: 24 MMOL/L — SIGNIFICANT CHANGE UP (ref 22–31)
CREAT SERPL-MCNC: 0.61 MG/DL — SIGNIFICANT CHANGE UP (ref 0.5–1.3)
GLUCOSE BLDC GLUCOMTR-MCNC: 115 MG/DL — HIGH (ref 70–99)
GLUCOSE BLDC GLUCOMTR-MCNC: 120 MG/DL — HIGH (ref 70–99)
GLUCOSE BLDC GLUCOMTR-MCNC: 126 MG/DL — HIGH (ref 70–99)
GLUCOSE BLDC GLUCOMTR-MCNC: 163 MG/DL — HIGH (ref 70–99)
GLUCOSE BLDC GLUCOMTR-MCNC: 86 MG/DL — SIGNIFICANT CHANGE UP (ref 70–99)
GLUCOSE SERPL-MCNC: 97 MG/DL — SIGNIFICANT CHANGE UP (ref 70–99)
HCT VFR BLD CALC: 38.3 % — SIGNIFICANT CHANGE UP (ref 34.5–45)
HGB BLD-MCNC: 12.4 G/DL — SIGNIFICANT CHANGE UP (ref 11.5–15.5)
MAGNESIUM SERPL-MCNC: 2 MG/DL — SIGNIFICANT CHANGE UP (ref 1.6–2.6)
MCHC RBC-ENTMCNC: 30.6 PG — SIGNIFICANT CHANGE UP (ref 27–34)
MCHC RBC-ENTMCNC: 32.4 GM/DL — SIGNIFICANT CHANGE UP (ref 32–36)
MCV RBC AUTO: 94.6 FL — SIGNIFICANT CHANGE UP (ref 80–100)
NRBC # BLD: 0 /100 WBCS — SIGNIFICANT CHANGE UP (ref 0–0)
PHOSPHATE SERPL-MCNC: 3.2 MG/DL — SIGNIFICANT CHANGE UP (ref 2.5–4.5)
PLATELET # BLD AUTO: 318 K/UL — SIGNIFICANT CHANGE UP (ref 150–400)
POTASSIUM SERPL-MCNC: 4 MMOL/L — SIGNIFICANT CHANGE UP (ref 3.5–5.3)
POTASSIUM SERPL-SCNC: 4 MMOL/L — SIGNIFICANT CHANGE UP (ref 3.5–5.3)
PROCALCITONIN SERPL-MCNC: 0.02 NG/ML — SIGNIFICANT CHANGE UP (ref 0.02–0.1)
RBC # BLD: 4.05 M/UL — SIGNIFICANT CHANGE UP (ref 3.8–5.2)
RBC # FLD: 13.4 % — SIGNIFICANT CHANGE UP (ref 10.3–14.5)
SODIUM SERPL-SCNC: 141 MMOL/L — SIGNIFICANT CHANGE UP (ref 135–145)
WBC # BLD: 13.74 K/UL — HIGH (ref 3.8–10.5)
WBC # FLD AUTO: 13.74 K/UL — HIGH (ref 3.8–10.5)

## 2020-01-04 PROCEDURE — 99233 SBSQ HOSP IP/OBS HIGH 50: CPT | Mod: GC

## 2020-01-04 RX ORDER — POLYETHYLENE GLYCOL 3350 17 G/17G
17 POWDER, FOR SOLUTION ORAL DAILY
Refills: 0 | Status: DISCONTINUED | OUTPATIENT
Start: 2020-01-04 | End: 2020-01-05

## 2020-01-04 RX ORDER — SENNA PLUS 8.6 MG/1
1 TABLET ORAL DAILY
Refills: 0 | Status: DISCONTINUED | OUTPATIENT
Start: 2020-01-04 | End: 2020-01-05

## 2020-01-04 RX ADMIN — CEFTRIAXONE 100 MILLIGRAM(S): 500 INJECTION, POWDER, FOR SOLUTION INTRAMUSCULAR; INTRAVENOUS at 20:05

## 2020-01-04 RX ADMIN — ATORVASTATIN CALCIUM 40 MILLIGRAM(S): 80 TABLET, FILM COATED ORAL at 21:35

## 2020-01-04 RX ADMIN — Medication 81 MILLIGRAM(S): at 11:22

## 2020-01-04 RX ADMIN — SENNA PLUS 1 TABLET(S): 8.6 TABLET ORAL at 21:42

## 2020-01-04 RX ADMIN — ENOXAPARIN SODIUM 40 MILLIGRAM(S): 100 INJECTION SUBCUTANEOUS at 11:22

## 2020-01-04 NOTE — PROGRESS NOTE ADULT - ASSESSMENT
79F with PMHx of T2DM, meningioma (post-resection decades prior), prior CVA, and BPPV presents with one episode of vertigo one day prior to admission. Patient also having dysuria for several days not improving on antibiotics she has been taking on her own which she received from [facility] months prior. CT head showing sub-centimeter hypodensity of right cerebellum (age indeterminate). Patient with grossly positive UA (>50 WBC, +Bact, +LE). Exam notable for intact neurological exam with [place]-Hallpike maneuver negative (though limited by patient positioning). Given patient's history her subcentimeter hypodensity in cerebellum likely chronic. She has a UTI by history and supported by UA. There is a possibility that she is experiencing a recrudescence of her prior stroke. Patient explanation of her vertigo sounds typical for peripheral vertigo (consistent with her prior BPPV) with low suspicion for central vertigo at this time. Patient admitted for treatment of UTI. 79F with PMHx of T2DM, meningioma (post-resection decades prior), prior CVA, and BPPV presents with vertigo and dysuria not improving on antibiotics, found to have UTI 2/2 GNR.

## 2020-01-04 NOTE — PROGRESS NOTE ADULT - SUBJECTIVE AND OBJECTIVE BOX
PROGRESS NOTE:   ************************************************  Authored by: Celina Viramontes MD PGY1  Psychiatry  Pager: [facility] [phone]; [facility] [phone]  *************************************************    Patient is a 79y old  Female who presents with a chief complaint of Vertigo (2020 08:56)      SUBJECTIVE / OVERNIGHT EVENTS: The patient was seen and examined at bedside.     REVIEW OF SYSTEMS:    CONSTITUTIONAL: No weakness, fevers or chills  EYES/ENT: No visual changes;  No vertigo or throat pain   NECK: No pain or stiffness  RESPIRATORY: No cough, wheezing, hemoptysis; No shortness of breath  CARDIOVASCULAR: No chest pain or palpitations  GASTROINTESTINAL: No abdominal or epigastric pain. No nausea, vomiting, or hematemesis; No diarrhea or constipation. No melena or hematochezia.  GENITOURINARY: No dysuria, frequency or hematuria  NEUROLOGICAL: No numbness or weakness  SKIN: No itching, rashes    MEDICATIONS  (STANDING):  aspirin enteric coated 81 milliGRAM(s) Oral daily  atorvastatin 40 milliGRAM(s) Oral at bedtime  cefTRIAXone   IVPB 1000 milliGRAM(s) IV Intermittent every 24 hours  dextrose 5%. 1000 milliLiter(s) (50 mL/Hr) IV Continuous <Continuous>  dextrose 50% Injectable 12.5 Gram(s) IV Push once  dextrose 50% Injectable 25 Gram(s) IV Push once  dextrose 50% Injectable 25 Gram(s) IV Push once  enoxaparin Injectable 40 milliGRAM(s) SubCutaneous daily  insulin lispro (HumaLOG) corrective regimen sliding scale   SubCutaneous three times a day before meals  insulin lispro (HumaLOG) corrective regimen sliding scale   SubCutaneous at bedtime    MEDICATIONS  (PRN):  acetaminophen   Tablet .. 650 milliGRAM(s) Oral every 6 hours PRN Mild Pain (1 - 3), Moderate Pain (4 - 6)  dextrose 40% Gel 15 Gram(s) Oral once PRN Blood Glucose LESS THAN 70 milliGRAM(s)/deciliter  glucagon  Injectable 1 milliGRAM(s) IntraMuscular once PRN Glucose LESS THAN 70 milligrams/deciliter  meclizine 25 milliGRAM(s) Oral every 6 hours PRN Vertigo      CAPILLARY BLOOD GLUCOSE      POCT Blood Glucose.: 160 mg/dL (2020 22:22)  POCT Blood Glucose.: 231 mg/dL (2020 17:31)  POCT Blood Glucose.: 147 mg/dL (2020 11:59)  POCT Blood Glucose.: 107 mg/dL (2020 08:30)    I&O's Summary    2020 07:01  -  2020 07:00  --------------------------------------------------------  IN: 540 mL / OUT: 302 mL / NET: 238 mL        PHYSICAL EXAM:  Vital Signs Last 24 Hrs  T(C): 36.7 (2020 04:44), Max: 36.7 (2020 21:59)  T(F): 98 (2020 04:44), Max: 98.1 (2020 21:59)  HR: 58 (2020 04:44) (58 - 73)  BP: 107/65 (2020 04:44) (107/65 - 174/70)  BP(mean): --  RR: 18 (2020 04:44) (16 - 18)  SpO2: 95% (2020 04:44) (94% - 95%)    TELEMETRY:    CONSTITUTIONAL: NAD, well-developed  RESPIRATORY: Normal respiratory effort; lungs are clear to auscultation bilaterally  CARDIOVASCULAR: Regular rate and rhythm, normal S1 and S2, no murmur/rub/gallop; No lower extremity edema; Peripheral pulses are 2+ bilaterally  ABDOMEN: Nontender to palpation, normoactive bowel sounds, no rebound/guarding; No hepatosplenomegaly  MUSCLOSKELETAL: no clubbing or cyanosis of digits; no joint swelling or tenderness to palpation  PSYCH: A+O to person, place, and time; affect appropriate    LABS:                        12.7   15.21 )-----------( 335      ( 2020 07:02 )             40.6     01-03    141  |  101  |  16  ----------------------------<  96  3.9   |  26  |  0.65    Ca    9.9      2020 07:00    TPro  7.7  /  Alb  4.4  /  TBili  0.4  /  DBili  x   /  AST  16  /  ALT  19  /  AlkPhos  60  01-02    PT/INR - ( 2020 15:43 )   PT: 12.7 sec;   INR: 1.10 ratio         PTT - ( 2020 15:43 )  PTT:24.7 sec      Urinalysis Basic - ( 2020 16:06 )    Color: Light Orange / Appearance: Turbid / S.019 / pH: x  Gluc: x / Ketone: Negative  / Bili: Negative / Urobili: Negative   Blood: x / Protein: 30 mg/dL / Nitrite: Negative   Leuk Esterase: Large / RBC: >50 /hpf / WBC >50   Sq Epi: x / Non Sq Epi: Few / Bacteria: Many          RADIOLOGY & ADDITIONAL TESTS:  Results Reviewed:   Imaging Personally Reviewed:  Electrocardiogram Personally Reviewed:    COORDINATION OF CARE:  Care Discussed with Consultants/Other Providers [Y/N]:  Prior or Outpatient Records Reviewed [Y/N]: PROGRESS NOTE:   ************************************************  Authored by: Celina Viramontes MD PGY1  Psychiatry  Pager: [facility] [phone]; [facility] [phone]  *************************************************    Patient is a 79y old  Female who presents with a chief complaint of Vertigo (2020 08:56)      SUBJECTIVE / OVERNIGHT EVENTS: The patient was seen and examined at bedside. No acute events overnight.  Today, patient still with dysuria, subjectively unchanged, some vertigo with positional changes.  Feels more steady on her feet today with ambulation.    REVIEW OF SYSTEMS:    CONSTITUTIONAL: No weakness, fevers or chills  EYES/ENT: No visual changes;  +vertigo no throat pain   NECK: No pain or stiffness  RESPIRATORY: No cough, wheezing, hemoptysis; No shortness of breath  CARDIOVASCULAR: No chest pain or palpitations  GASTROINTESTINAL: No abdominal or epigastric pain. No nausea, vomiting, or hematemesis; No diarrhea or constipation. No melena or hematochezia.  GENITOURINARY: +dysuria, frequency; nohematuria  NEUROLOGICAL: No numbness or weakness  SKIN: No itching, rashes    MEDICATIONS  (STANDING):  aspirin enteric coated 81 milliGRAM(s) Oral daily  atorvastatin 40 milliGRAM(s) Oral at bedtime  cefTRIAXone   IVPB 1000 milliGRAM(s) IV Intermittent every 24 hours  dextrose 5%. 1000 milliLiter(s) (50 mL/Hr) IV Continuous <Continuous>  dextrose 50% Injectable 12.5 Gram(s) IV Push once  dextrose 50% Injectable 25 Gram(s) IV Push once  dextrose 50% Injectable 25 Gram(s) IV Push once  enoxaparin Injectable 40 milliGRAM(s) SubCutaneous daily  insulin lispro (HumaLOG) corrective regimen sliding scale   SubCutaneous three times a day before meals  insulin lispro (HumaLOG) corrective regimen sliding scale   SubCutaneous at bedtime    MEDICATIONS  (PRN):  acetaminophen   Tablet .. 650 milliGRAM(s) Oral every 6 hours PRN Mild Pain (1 - 3), Moderate Pain (4 - 6)  dextrose 40% Gel 15 Gram(s) Oral once PRN Blood Glucose LESS THAN 70 milliGRAM(s)/deciliter  glucagon  Injectable 1 milliGRAM(s) IntraMuscular once PRN Glucose LESS THAN 70 milligrams/deciliter  meclizine 25 milliGRAM(s) Oral every 6 hours PRN Vertigo      CAPILLARY BLOOD GLUCOSE      POCT Blood Glucose.: 160 mg/dL (2020 22:22)  POCT Blood Glucose.: 231 mg/dL (2020 17:31)  POCT Blood Glucose.: 147 mg/dL (2020 11:59)  POCT Blood Glucose.: 107 mg/dL (2020 08:30)    I&O's Summary    2020 07:01  -  2020 07:00  --------------------------------------------------------  IN: 540 mL / OUT: 302 mL / NET: 238 mL        PHYSICAL EXAM:  Vital Signs Last 24 Hrs  T(C): 36.7 (2020 04:44), Max: 36.7 (2020 21:59)  T(F): 98 (2020 04:44), Max: 98.1 (2020 21:59)  HR: 58 (2020 04:44) (58 - 73)  BP: 107/65 (2020 04:44) (107/65 - 174/70)  BP(mean): --  RR: 18 (2020 04:44) (16 - 18)  SpO2: 95% (2020 04:44) (94% - 95%)    TELEMETRY: n/a    CONSTITUTIONAL: NAD, well-developed, seen ambulating and resting in chair  RESPIRATORY: Normal respiratory effort; lungs are clear to auscultation bilaterally  CARDIOVASCULAR: Regular rate and rhythm, normal S1 and S2, no murmur/rub/gallop; No lower extremity edema; Peripheral pulses are 2+ bilaterally  ABDOMEN: Nontender to palpation, normoactive bowel sounds, no rebound/guarding; No hepatosplenomegaly  MUSCLOSKELETAL: no clubbing or cyanosis of digits; no joint swelling or tenderness to palpation  PSYCH: A+O to person, place, and time; affect appropriate      MEDICATIONS  (STANDING):  aspirin enteric coated 81 milliGRAM(s) Oral daily  atorvastatin 40 milliGRAM(s) Oral at bedtime  cefTRIAXone   IVPB 1000 milliGRAM(s) IV Intermittent every 24 hours  dextrose 5%. 1000 milliLiter(s) (50 mL/Hr) IV Continuous <Continuous>  dextrose 50% Injectable 12.5 Gram(s) IV Push once  dextrose 50% Injectable 25 Gram(s) IV Push once  dextrose 50% Injectable 25 Gram(s) IV Push once  enoxaparin Injectable 40 milliGRAM(s) SubCutaneous daily  insulin lispro (HumaLOG) corrective regimen sliding scale   SubCutaneous three times a day before meals  insulin lispro (HumaLOG) corrective regimen sliding scale   SubCutaneous at bedtime    MEDICATIONS  (PRN):  acetaminophen   Tablet .. 650 milliGRAM(s) Oral every 6 hours PRN Mild Pain (1 - 3), Moderate Pain (4 - 6)  dextrose 40% Gel 15 Gram(s) Oral once PRN Blood Glucose LESS THAN 70 milliGRAM(s)/deciliter  glucagon  Injectable 1 milliGRAM(s) IntraMuscular once PRN Glucose LESS THAN 70 milligrams/deciliter  meclizine 25 milliGRAM(s) Oral every 6 hours PRN Vertigo      LABS:                        12.4   13.74 )-----------( 318      ( 2020 07:28 )             38.3     01-04    141  |  102  |  18  ----------------------------<  97  4.0   |  24  |  0.61    Ca    9.2      2020 07:28  Phos  3.2     01-04  Mg     2.0     01-04    TPro  7.7  /  Alb  4.4  /  TBili  0.4  /  DBili  x   /  AST  16  /  ALT  19  /  AlkPhos  60  01-02    PT/INR - ( 2020 15:43 )   PT: 12.7 sec;   INR: 1.10 ratio         PTT - ( 2020 15:43 )  PTT:24.7 sec      Urinalysis Basic - ( 2020 16:06 )    Color: Light Orange / Appearance: Turbid / S.019 / pH: x  Gluc: x / Ketone: Negative  / Bili: Negative / Urobili: Negative   Blood: x / Protein: 30 mg/dL / Nitrite: Negative   Leuk Esterase: Large / RBC: >50 /hpf / WBC >50   Sq Epi: x / Non Sq Epi: Few / Bacteria: Many        Culture - Urine (collected 2020 07:03)  Source: .Urine Clean Catch (Midstream)  Preliminary Report (2020 09:00):    50,000 - 99,000 CFU/mL Gram Negative Rods          RADIOLOGY & ADDITIONAL TESTS:  Results Reviewed:   Imaging Personally Reviewed:  Electrocardiogram Personally Reviewed:    COORDINATION OF CARE:  Care Discussed with Consultants/Other Providers [Y/N]:  Prior or Outpatient Records Reviewed [Y/N]:

## 2020-01-04 NOTE — PROGRESS NOTE ADULT - PROBLEM SELECTOR PLAN 2
- Possible recrudescence of old cerebellar stroke vs. peripheral vertigo. EKG showing NSR, no telemetry for now, consider outpatient cardiac monitor, no LVO on CTA H&N  - F/u MRI   - F/u PT recs  - Ambulate with assistance - Possible recrudescence of old cerebellar stroke vs. peripheral vertigo. EKG showing NSR, no telemetry for now, consider outpatient cardiac monitor, no LVO on CTA H&N  - F/u MRI   - F/u PT recs  - Ambulate with assistance  - Pt wants to go home, likely home with home services

## 2020-01-04 NOTE — PROGRESS NOTE ADULT - PROBLEM SELECTOR PLAN 6
Transitions of Care Status:  1.  Name of PCP: Carlos Fuentes  2.  PCP Contacted on Admission: [ ] Y    [ ] N    3.  PCP contacted at Discharge: [ ] Y    [ ] N    [ ] N/A  4.  Post-Discharge Appointment Date and Location:  5.  Summary of Handoff given to PCP: DVT ppx: lovenox  Diet: Regular  Dispo: likely home with home services, awaiting final PT recs

## 2020-01-04 NOTE — PROGRESS NOTE ADULT - PROBLEM SELECTOR PLAN 5
- Hold home metformin and sulfonylurea while inpatient  - C/w TID AC and Insulin sliding scale - Hold home metformin and sulfonylurea while inpatient  - C/w TID AC and Insulin sliding scale  - pt refusing insulin despite education

## 2020-01-04 NOTE — PROGRESS NOTE ADULT - PROBLEM SELECTOR PLAN 3
- C/w ASA81 for secondary prevention  - C/w Lipitor  - F/u A1c and Lipid Profile for risk stratification - C/w ASA81 for secondary prevention  - C/w Lipitor  - A1c 6.9,   - pt refusing lipitor despite education

## 2020-01-05 ENCOUNTER — TRANSCRIPTION ENCOUNTER (OUTPATIENT)
Age: 80
End: 2020-01-05

## 2020-01-05 VITALS
HEART RATE: 88 BPM | DIASTOLIC BLOOD PRESSURE: 76 MMHG | RESPIRATION RATE: 18 BRPM | TEMPERATURE: 98 F | OXYGEN SATURATION: 96 % | SYSTOLIC BLOOD PRESSURE: 120 MMHG

## 2020-01-05 LAB
-  AMIKACIN: SIGNIFICANT CHANGE UP
-  AZTREONAM: SIGNIFICANT CHANGE UP
-  CEFEPIME: SIGNIFICANT CHANGE UP
-  CEFTAZIDIME: SIGNIFICANT CHANGE UP
-  CIPROFLOXACIN: SIGNIFICANT CHANGE UP
-  GENTAMICIN: SIGNIFICANT CHANGE UP
-  IMIPENEM: SIGNIFICANT CHANGE UP
-  LEVOFLOXACIN: SIGNIFICANT CHANGE UP
-  MEROPENEM: SIGNIFICANT CHANGE UP
-  PIPERACILLIN/TAZOBACTAM: SIGNIFICANT CHANGE UP
-  TOBRAMYCIN: SIGNIFICANT CHANGE UP
GLUCOSE BLDC GLUCOMTR-MCNC: 118 MG/DL — HIGH (ref 70–99)
GLUCOSE BLDC GLUCOMTR-MCNC: 130 MG/DL — HIGH (ref 70–99)
HCT VFR BLD CALC: 39.7 % — SIGNIFICANT CHANGE UP (ref 34.5–45)
HGB BLD-MCNC: 12.6 G/DL — SIGNIFICANT CHANGE UP (ref 11.5–15.5)
MCHC RBC-ENTMCNC: 30.3 PG — SIGNIFICANT CHANGE UP (ref 27–34)
MCHC RBC-ENTMCNC: 31.7 GM/DL — LOW (ref 32–36)
MCV RBC AUTO: 95.4 FL — SIGNIFICANT CHANGE UP (ref 80–100)
METHOD TYPE: SIGNIFICANT CHANGE UP
NRBC # BLD: 0 /100 WBCS — SIGNIFICANT CHANGE UP (ref 0–0)
PLATELET # BLD AUTO: 333 K/UL — SIGNIFICANT CHANGE UP (ref 150–400)
RBC # BLD: 4.16 M/UL — SIGNIFICANT CHANGE UP (ref 3.8–5.2)
RBC # FLD: 13.3 % — SIGNIFICANT CHANGE UP (ref 10.3–14.5)
WBC # BLD: 12.9 K/UL — HIGH (ref 3.8–10.5)
WBC # FLD AUTO: 12.9 K/UL — HIGH (ref 3.8–10.5)

## 2020-01-05 PROCEDURE — 97116 GAIT TRAINING THERAPY: CPT

## 2020-01-05 PROCEDURE — 84145 PROCALCITONIN (PCT): CPT

## 2020-01-05 PROCEDURE — 70496 CT ANGIOGRAPHY HEAD: CPT

## 2020-01-05 PROCEDURE — 85730 THROMBOPLASTIN TIME PARTIAL: CPT

## 2020-01-05 PROCEDURE — 70450 CT HEAD/BRAIN W/O DYE: CPT

## 2020-01-05 PROCEDURE — 87186 SC STD MICRODIL/AGAR DIL: CPT

## 2020-01-05 PROCEDURE — 70551 MRI BRAIN STEM W/O DYE: CPT

## 2020-01-05 PROCEDURE — 99285 EMERGENCY DEPT VISIT HI MDM: CPT | Mod: 25

## 2020-01-05 PROCEDURE — 96374 THER/PROPH/DIAG INJ IV PUSH: CPT | Mod: XU

## 2020-01-05 PROCEDURE — 93005 ELECTROCARDIOGRAM TRACING: CPT

## 2020-01-05 PROCEDURE — 71045 X-RAY EXAM CHEST 1 VIEW: CPT

## 2020-01-05 PROCEDURE — 80053 COMPREHEN METABOLIC PANEL: CPT

## 2020-01-05 PROCEDURE — 85027 COMPLETE CBC AUTOMATED: CPT

## 2020-01-05 PROCEDURE — 80048 BASIC METABOLIC PNL TOTAL CA: CPT

## 2020-01-05 PROCEDURE — 81001 URINALYSIS AUTO W/SCOPE: CPT

## 2020-01-05 PROCEDURE — 97161 PT EVAL LOW COMPLEX 20 MIN: CPT

## 2020-01-05 PROCEDURE — 82962 GLUCOSE BLOOD TEST: CPT

## 2020-01-05 PROCEDURE — 83036 HEMOGLOBIN GLYCOSYLATED A1C: CPT

## 2020-01-05 PROCEDURE — 97165 OT EVAL LOW COMPLEX 30 MIN: CPT

## 2020-01-05 PROCEDURE — 83735 ASSAY OF MAGNESIUM: CPT

## 2020-01-05 PROCEDURE — 87086 URINE CULTURE/COLONY COUNT: CPT

## 2020-01-05 PROCEDURE — 97530 THERAPEUTIC ACTIVITIES: CPT

## 2020-01-05 PROCEDURE — 96375 TX/PRO/DX INJ NEW DRUG ADDON: CPT | Mod: XU

## 2020-01-05 PROCEDURE — 70498 CT ANGIOGRAPHY NECK: CPT

## 2020-01-05 PROCEDURE — 84100 ASSAY OF PHOSPHORUS: CPT

## 2020-01-05 PROCEDURE — 80061 LIPID PANEL: CPT

## 2020-01-05 PROCEDURE — 85610 PROTHROMBIN TIME: CPT

## 2020-01-05 PROCEDURE — 99239 HOSP IP/OBS DSCHRG MGMT >30: CPT | Mod: GC

## 2020-01-05 RX ORDER — MECLIZINE HCL 12.5 MG
1 TABLET ORAL
Qty: 20 | Refills: 0
Start: 2020-01-05 | End: 2020-01-14

## 2020-01-05 RX ORDER — ASPIRIN/CALCIUM CARB/MAGNESIUM 324 MG
1 TABLET ORAL
Qty: 30 | Refills: 0
Start: 2020-01-05 | End: 2020-02-03

## 2020-01-05 RX ADMIN — ENOXAPARIN SODIUM 40 MILLIGRAM(S): 100 INJECTION SUBCUTANEOUS at 13:26

## 2020-01-05 RX ADMIN — Medication 25 MILLIGRAM(S): at 09:39

## 2020-01-05 RX ADMIN — SENNA PLUS 1 TABLET(S): 8.6 TABLET ORAL at 13:26

## 2020-01-05 RX ADMIN — Medication 81 MILLIGRAM(S): at 13:25

## 2020-01-05 NOTE — PROGRESS NOTE ADULT - PROBLEM SELECTOR PLAN 6
DVT ppx: lovenox  Diet: Regular  Dispo: likely home with home services, awaiting final PT recs DVT ppx: lovenox  Diet: Regular  Dispo: awaiting final PT recs, to be seen today DVT ppx: lovenox  Diet: Regular  Dispo: home with outpt PT, cane

## 2020-01-05 NOTE — PROGRESS NOTE ADULT - PROBLEM SELECTOR PLAN 2
- Possible recrudescence of old cerebellar stroke vs. peripheral vertigo. EKG showing NSR, no telemetry for now, consider outpatient cardiac monitor, no LVO on CTA H&N  - F/u MRI   - F/u PT recs  - Ambulate with assistance  - Pt wants to go home, likely home with home services - Possible recrudescence of old cerebellar stroke vs. peripheral vertigo. EKG showing NSR, no telemetry for now, consider outpatient cardiac monitor, no LVO on CTA H&N  - MRI :No gross evidence for acute infarct or acute hemorrhage. Chronic right cerebellar lacunar infarcts and chronic white matter changes are present as described.  - F/u PT final recs  - Ambulate with assistance  - Pt amenable to inpatient rehab, still not walking at baseline - Possible recrudescence of old cerebellar stroke vs. peripheral vertigo. EKG showing NSR, no telemetry for now, consider outpatient cardiac monitor, no LVO on CTA H&N  - MRI: No gross evidence for acute infarct or acute hemorrhage. Chronic right cerebellar lacunar infarcts and chronic white matter changes are present as described.  - F/u PT final recs  - OT eval recommending rolling walker and shower chair  - Ambulate with assistance  - Pt amenable to possibility of inpatient rehab, still not walking at baseline - Possible recrudescence of old cerebellar stroke vs. peripheral vertigo. EKG showing NSR, no telemetry for now, consider outpatient cardiac monitor, no LVO on CTA H&N  - MRI: No gross evidence for acute infarct or acute hemorrhage. Chronic right cerebellar lacunar infarcts and chronic white matter changes are present as described.  - PT eval rec home with outpt PT  - OT eval recommending rolling walker and shower chair  - Ambulate with assistance  - Pt amenable to possibility of inpatient rehab, still not walking at baseline

## 2020-01-05 NOTE — DISCHARGE NOTE PROVIDER - NSDCFUADDINST_GEN_ALL_CORE_FT
Please follow up with your primary care provider within one week of discharge for continued management.

## 2020-01-05 NOTE — PROGRESS NOTE ADULT - ATTENDING COMMENTS
suggest MRI brain to evaluate for cerebellar infarcts  PT eval  abx for acute cystitis  get CXR and procalcitonin given leukocytosis.    Cr Fajardo MD, MHA, FACP, [facility]  Pager: [phone]  If no response or off-hours, page [phone]
await MRI brain results  await final PT recs  likely home discharge in 1-2 days  WBC trending down    Cr Fajardo MD, MHA, FACP, [facility]  Pager: [phone]  If no response or off-hours, page [phone]
aspirin, statin for CVA prevention  quinolone for UTI  PCP f/up

## 2020-01-05 NOTE — DISCHARGE NOTE PROVIDER - CARE PROVIDER_API CALL
Carlos Fuentes (MD)  Medicine  [address] [facility], [address]  [place] [address]  Phone: [phone]  Fax: [phone]  Established Patient  Follow Up Time: 1 week

## 2020-01-05 NOTE — DISCHARGE NOTE PROVIDER - NSDCMRMEDTOKEN_GEN_ALL_CORE_FT
glipiZIDE 5 mg oral tablet: 1 tab(s) orally once a day  Lipitor 10 mg oral tablet: 1 tab(s) orally once a day  metFORMIN 500 mg oral tablet: 1 tab(s) orally once a day aspirin 81 mg oral delayed release tablet: 1 tab(s) orally once a day  glipiZIDE 5 mg oral tablet: 1 tab(s) orally once a day  Levaquin 750 mg oral tablet: 1 tab(s) orally once a day   Lipitor 10 mg oral tablet: 1 tab(s) orally once a day  meclizine 25 mg oral tablet: 1 tab(s) orally 2 times a day, As Needed -Vertigo   metFORMIN 500 mg oral tablet: 1 tab(s) orally once a day  Physical Therapy: Patient requires outpatient physical therapy 2-3 times per week for 6 weeks for unsteady gait and balance training.  [id number]

## 2020-01-05 NOTE — DISCHARGE NOTE PROVIDER - NSDCCPCAREPLAN_GEN_ALL_CORE_FT
PRINCIPAL DISCHARGE DIAGNOSIS  Diagnosis: Urinary tract infection  Assessment and Plan of Treatment: You came to the hospital with a history of painful, frequent urination and were found to have a urinary tract infection.  Urine culture showed that the bacteria causing your infection is called Pseudomonas.  You were started on an antibiotic called levofloxacin, and are being sent home with a prescription to complete a 5-day course.  Please follow up with your outpatient provider within one week of discharge for continued management.  If your symptoms worsen or return, or if you develop fevers or back pain, please return to the hospital for futher evaluation and treatment.      SECONDARY DISCHARGE DIAGNOSES  Diagnosis: Cerebellar infarct  Assessment and Plan of Treatment: You came to the hospital with an unsteady gait, and were found by imaging of both CT and MRI of the brain to have an old infarct, or stroke, in the part of the brain called the cerebellum.  This part of the brain is important for a steady gait when walking.  Although this stroke was not new, you were experiencing symptoms related to it, which is called recrudescence.  This is usually a temporary experience, and the deficits should resolve over time.  We are prescribing outpatient physical therapy for continued management and treatment of your unsteadiness when walking.  PT also gave you a cane to assist with walking at home.  Given your history of stroke, we recommend you continue with your home Lipitor and also take a baby Aspirin 81mg daily to help prevent additional strokes. Please follow up with your outpatient provider within one week of discharge for continued management.    Diagnosis: Vertigo  Assessment and Plan of Treatment: You came to the hospital with a history of BPPV, or benign paroxysmal positional vertigo.  You were having new episodes of vertigo when you came to the hospital associated with your unsteady gait.  CT of the head did not find any associated mass or bleed in your brain, and evaluation by neurology suggested your vertigo was not caused by a process in your brain but rather one in your inner ear.  It may be related to a recent cold you had.  You were treated with as-needed meclizine with improvement in your symptoms.  You are being discharged with a prescription for meclizine to help manage your symptoms at home.  Please follow up with your outpatient provider within one week of discharge for continued management.

## 2020-01-05 NOTE — PROGRESS NOTE ADULT - PROBLEM SELECTOR PLAN 3
- C/w ASA81 for secondary prevention  - C/w Lipitor  - A1c 6.9,   - pt refusing lipitor despite education - C/w ASA81 for secondary prevention  - C/w Lipitor  - A1c 6.9,  - C/w ASA81 for secondary prevention  - C/w Lipitor  - A1c 6.9,   - Plan to discharge pt on ASA and lipitor for stroke prevention

## 2020-01-05 NOTE — PROGRESS NOTE ADULT - SUBJECTIVE AND OBJECTIVE BOX
PROGRESS NOTE:   ************************************************  Authored by: Celina Viramontes MD PGY1  Psychiatry  Pager: [facility] [phone]; [facility] [phone]  *************************************************    Patient is a 79y old  Female who presents with a chief complaint of Vertigo (04 Jan 2020 07:33)      SUBJECTIVE / OVERNIGHT EVENTS: The patient was seen and examined at bedside.     REVIEW OF SYSTEMS:    CONSTITUTIONAL: No weakness, fevers or chills  EYES/ENT: No visual changes;  No vertigo or throat pain   NECK: No pain or stiffness  RESPIRATORY: No cough, wheezing, hemoptysis; No shortness of breath  CARDIOVASCULAR: No chest pain or palpitations  GASTROINTESTINAL: No abdominal or epigastric pain. No nausea, vomiting, or hematemesis; No diarrhea or constipation. No melena or hematochezia.  GENITOURINARY: No dysuria, frequency or hematuria  NEUROLOGICAL: No numbness or weakness  SKIN: No itching, rashes    MEDICATIONS  (STANDING):  aspirin enteric coated 81 milliGRAM(s) Oral daily  atorvastatin 40 milliGRAM(s) Oral at bedtime  cefTRIAXone   IVPB 1000 milliGRAM(s) IV Intermittent every 24 hours  dextrose 5%. 1000 milliLiter(s) (50 mL/Hr) IV Continuous <Continuous>  dextrose 50% Injectable 12.5 Gram(s) IV Push once  dextrose 50% Injectable 25 Gram(s) IV Push once  dextrose 50% Injectable 25 Gram(s) IV Push once  enoxaparin Injectable 40 milliGRAM(s) SubCutaneous daily  insulin lispro (HumaLOG) corrective regimen sliding scale   SubCutaneous three times a day before meals  insulin lispro (HumaLOG) corrective regimen sliding scale   SubCutaneous at bedtime  polyethylene glycol 3350 17 Gram(s) Oral daily  senna 1 Tablet(s) Oral daily    MEDICATIONS  (PRN):  acetaminophen   Tablet .. 650 milliGRAM(s) Oral every 6 hours PRN Mild Pain (1 - 3), Moderate Pain (4 - 6)  dextrose 40% Gel 15 Gram(s) Oral once PRN Blood Glucose LESS THAN 70 milliGRAM(s)/deciliter  glucagon  Injectable 1 milliGRAM(s) IntraMuscular once PRN Glucose LESS THAN 70 milligrams/deciliter  meclizine 25 milliGRAM(s) Oral every 6 hours PRN Vertigo      CAPILLARY BLOOD GLUCOSE      POCT Blood Glucose.: 115 mg/dL (04 Jan 2020 21:43)  POCT Blood Glucose.: 163 mg/dL (04 Jan 2020 18:32)  POCT Blood Glucose.: 120 mg/dL (04 Jan 2020 13:58)  POCT Blood Glucose.: 126 mg/dL (04 Jan 2020 12:05)  POCT Blood Glucose.: 86 mg/dL (04 Jan 2020 07:43)    I&O's Summary    04 Jan 2020 07:01  -  05 Jan 2020 07:00  --------------------------------------------------------  IN: 720 mL / OUT: 0 mL / NET: 720 mL        PHYSICAL EXAM:  Vital Signs Last 24 Hrs  T(C): 36.8 (05 Jan 2020 04:58), Max: 36.9 (04 Jan 2020 13:59)  T(F): 98.2 (05 Jan 2020 04:58), Max: 98.4 (04 Jan 2020 13:59)  HR: 80 (05 Jan 2020 04:58) (70 - 80)  BP: 122/69 (05 Jan 2020 04:58) (118/61 - 127/68)  BP(mean): --  RR: 18 (05 Jan 2020 04:58) (18 - 18)  SpO2: 94% (05 Jan 2020 04:58) (94% - 96%)    TELEMETRY:    CONSTITUTIONAL: NAD, well-developed  RESPIRATORY: Normal respiratory effort; lungs are clear to auscultation bilaterally  CARDIOVASCULAR: Regular rate and rhythm, normal S1 and S2, no murmur/rub/gallop; No lower extremity edema; Peripheral pulses are 2+ bilaterally  ABDOMEN: Nontender to palpation, normoactive bowel sounds, no rebound/guarding; No hepatosplenomegaly  MUSCLOSKELETAL: no clubbing or cyanosis of digits; no joint swelling or tenderness to palpation  PSYCH: A+O to person, place, and time; affect appropriate    LABS:                        12.4   13.74 )-----------( 318      ( 04 Jan 2020 07:28 )             38.3     01-04    141  |  102  |  18  ----------------------------<  97  4.0   |  24  |  0.61    Ca    9.2      04 Jan 2020 07:28  Phos  3.2     01-04  Mg     2.0     01-04                Culture - Urine (collected 03 Jan 2020 07:03)  Source: .Urine Clean Catch (Midstream)  Preliminary Report (04 Jan 2020 09:00):    50,000 - 99,000 CFU/mL Gram Negative Rods        RADIOLOGY & ADDITIONAL TESTS:  Results Reviewed:   Imaging Personally Reviewed:  Electrocardiogram Personally Reviewed:    COORDINATION OF CARE:  Care Discussed with Consultants/Other Providers [Y/N]:  Prior or Outpatient Records Reviewed [Y/N]: PROGRESS NOTE:   ************************************************  Authored by: Celina Viramontes MD PGY1  Psychiatry  Pager: [facility] [phone]; [facility] [phone]  *************************************************    Patient is a 79y old  Female who presents with a chief complaint of Vertigo (04 Jan 2020 07:33)      SUBJECTIVE / OVERNIGHT EVENTS: The patient was seen and examined at bedside. No acute events overnight.  Pt says symptoms of dysuria and frequency are improving but not resolved.  Does not feel vertigo at present but has not walked yet this AM.  Eating breakfast, will assess walking later this morning.    REVIEW OF SYSTEMS:    CONSTITUTIONAL: No weakness, fevers or chills  EYES/ENT: No visual changes;  No vertigo or throat pain   NECK: No pain or stiffness  RESPIRATORY: No cough, wheezing, hemoptysis; No shortness of breath  CARDIOVASCULAR: No chest pain or palpitations  GASTROINTESTINAL: No abdominal or epigastric pain. No nausea, vomiting, or hematemesis; No diarrhea or constipation. No melena or hematochezia.  GENITOURINARY: No dysuria, frequency or hematuria  NEUROLOGICAL: No numbness or weakness  SKIN: No itching, rashes    MEDICATIONS  (STANDING):  aspirin enteric coated 81 milliGRAM(s) Oral daily  atorvastatin 40 milliGRAM(s) Oral at bedtime  cefTRIAXone   IVPB 1000 milliGRAM(s) IV Intermittent every 24 hours  dextrose 5%. 1000 milliLiter(s) (50 mL/Hr) IV Continuous <Continuous>  dextrose 50% Injectable 12.5 Gram(s) IV Push once  dextrose 50% Injectable 25 Gram(s) IV Push once  dextrose 50% Injectable 25 Gram(s) IV Push once  enoxaparin Injectable 40 milliGRAM(s) SubCutaneous daily  insulin lispro (HumaLOG) corrective regimen sliding scale   SubCutaneous three times a day before meals  insulin lispro (HumaLOG) corrective regimen sliding scale   SubCutaneous at bedtime  polyethylene glycol 3350 17 Gram(s) Oral daily  senna 1 Tablet(s) Oral daily    MEDICATIONS  (PRN):  acetaminophen   Tablet .. 650 milliGRAM(s) Oral every 6 hours PRN Mild Pain (1 - 3), Moderate Pain (4 - 6)  dextrose 40% Gel 15 Gram(s) Oral once PRN Blood Glucose LESS THAN 70 milliGRAM(s)/deciliter  glucagon  Injectable 1 milliGRAM(s) IntraMuscular once PRN Glucose LESS THAN 70 milligrams/deciliter  meclizine 25 milliGRAM(s) Oral every 6 hours PRN Vertigo      CAPILLARY BLOOD GLUCOSE      POCT Blood Glucose.: 115 mg/dL (04 Jan 2020 21:43)  POCT Blood Glucose.: 163 mg/dL (04 Jan 2020 18:32)  POCT Blood Glucose.: 120 mg/dL (04 Jan 2020 13:58)  POCT Blood Glucose.: 126 mg/dL (04 Jan 2020 12:05)  POCT Blood Glucose.: 86 mg/dL (04 Jan 2020 07:43)    I&O's Summary    04 Jan 2020 07:01  -  05 Jan 2020 07:00  --------------------------------------------------------  IN: 720 mL / OUT: 0 mL / NET: 720 mL        PHYSICAL EXAM:  Vital Signs Last 24 Hrs  T(C): 36.8 (05 Jan 2020 04:58), Max: 36.9 (04 Jan 2020 13:59)  T(F): 98.2 (05 Jan 2020 04:58), Max: 98.4 (04 Jan 2020 13:59)  HR: 80 (05 Jan 2020 04:58) (70 - 80)  BP: 122/69 (05 Jan 2020 04:58) (118/61 - 127/68)  BP(mean): --  RR: 18 (05 Jan 2020 04:58) (18 - 18)  SpO2: 94% (05 Jan 2020 04:58) (94% - 96%)    TELEMETRY: n/a    CONSTITUTIONAL: NAD, well-developed, seen sitting up in chair  RESPIRATORY: Normal respiratory effort; lungs are clear to auscultation bilaterally  CARDIOVASCULAR: Regular rate and rhythm, normal S1 and S2, no murmur/rub/gallop; No lower extremity edema; Peripheral pulses are 2+ bilaterally  ABDOMEN: Nontender to palpation, normoactive bowel sounds, no rebound/guarding; No hepatosplenomegaly  MUSCLOSKELETAL: no clubbing or cyanosis of digits; no joint swelling or tenderness to palpation  PSYCH: A+O to person, place, and time; affect appropriate  NEURO:     LABS:                        12.6   12.90 )-----------( 333      ( 05 Jan 2020 07:09 )             39.7     01-04    141  |  102  |  18  ----------------------------<  97  4.0   |  24  |  0.61    Ca    9.2      04 Jan 2020 07:28  Phos  3.2     01-04  Mg     2.0     01-04        Culture - Urine (collected 03 Jan 2020 07:03)  Source: .Urine Clean Catch (Midstream)  Preliminary Report (04 Jan 2020 09:00):    50,000 - 99,000 CFU/mL Gram Negative Rods          RADIOLOGY & ADDITIONAL TESTS:  Results Reviewed:   Imaging Personally Reviewed:  Electrocardiogram Personally Reviewed:    COORDINATION OF CARE:  Care Discussed with Consultants/Other Providers [Y/N]:  Prior or Outpatient Records Reviewed [Y/N]: PROGRESS NOTE:   ************************************************  Authored by: Celina Viramontes MD PGY1  Psychiatry  Pager: [facility] [phone]; [facility] [phone]  *************************************************    Patient is a 79y old  Female who presents with a chief complaint of Vertigo (04 Jan 2020 07:33)      SUBJECTIVE / OVERNIGHT EVENTS: The patient was seen and examined at bedside. No acute events overnight.  Pt says symptoms of dysuria and frequency are improving but not resolved.  Does not feel vertigo at present but has not walked yet this AM.  Eating breakfast, will assess walking later this morning.    REVIEW OF SYSTEMS:    CONSTITUTIONAL: No weakness, fevers or chills  EYES/ENT: No visual changes;  No vertigo or throat pain   NECK: No pain or stiffness  RESPIRATORY: No cough, wheezing, hemoptysis; No shortness of breath  CARDIOVASCULAR: No chest pain or palpitations  GASTROINTESTINAL: No abdominal or epigastric pain. No nausea, vomiting, or hematemesis; No diarrhea or constipation. No melena or hematochezia.  GENITOURINARY: No dysuria, frequency or hematuria  NEUROLOGICAL: No numbness or weakness  SKIN: No itching, rashes    MEDICATIONS  (STANDING):  aspirin enteric coated 81 milliGRAM(s) Oral daily  atorvastatin 40 milliGRAM(s) Oral at bedtime  cefTRIAXone   IVPB 1000 milliGRAM(s) IV Intermittent every 24 hours  dextrose 5%. 1000 milliLiter(s) (50 mL/Hr) IV Continuous <Continuous>  dextrose 50% Injectable 12.5 Gram(s) IV Push once  dextrose 50% Injectable 25 Gram(s) IV Push once  dextrose 50% Injectable 25 Gram(s) IV Push once  enoxaparin Injectable 40 milliGRAM(s) SubCutaneous daily  insulin lispro (HumaLOG) corrective regimen sliding scale   SubCutaneous three times a day before meals  insulin lispro (HumaLOG) corrective regimen sliding scale   SubCutaneous at bedtime  polyethylene glycol 3350 17 Gram(s) Oral daily  senna 1 Tablet(s) Oral daily    MEDICATIONS  (PRN):  acetaminophen   Tablet .. 650 milliGRAM(s) Oral every 6 hours PRN Mild Pain (1 - 3), Moderate Pain (4 - 6)  dextrose 40% Gel 15 Gram(s) Oral once PRN Blood Glucose LESS THAN 70 milliGRAM(s)/deciliter  glucagon  Injectable 1 milliGRAM(s) IntraMuscular once PRN Glucose LESS THAN 70 milligrams/deciliter  meclizine 25 milliGRAM(s) Oral every 6 hours PRN Vertigo      CAPILLARY BLOOD GLUCOSE      POCT Blood Glucose.: 115 mg/dL (04 Jan 2020 21:43)  POCT Blood Glucose.: 163 mg/dL (04 Jan 2020 18:32)  POCT Blood Glucose.: 120 mg/dL (04 Jan 2020 13:58)  POCT Blood Glucose.: 126 mg/dL (04 Jan 2020 12:05)  POCT Blood Glucose.: 86 mg/dL (04 Jan 2020 07:43)    I&O's Summary    04 Jan 2020 07:01  -  05 Jan 2020 07:00  --------------------------------------------------------  IN: 720 mL / OUT: 0 mL / NET: 720 mL        PHYSICAL EXAM:  Vital Signs Last 24 Hrs  T(C): 36.8 (05 Jan 2020 04:58), Max: 36.9 (04 Jan 2020 13:59)  T(F): 98.2 (05 Jan 2020 04:58), Max: 98.4 (04 Jan 2020 13:59)  HR: 80 (05 Jan 2020 04:58) (70 - 80)  BP: 122/69 (05 Jan 2020 04:58) (118/61 - 127/68)  BP(mean): --  RR: 18 (05 Jan 2020 04:58) (18 - 18)  SpO2: 94% (05 Jan 2020 04:58) (94% - 96%)    TELEMETRY: n/a    CONSTITUTIONAL: NAD, well-developed, seen sitting up in chair  RESPIRATORY: Normal respiratory effort; lungs are clear to auscultation bilaterally  CARDIOVASCULAR: Regular rate and rhythm, normal S1 and S2, no murmur/rub/gallop; No lower extremity edema; Peripheral pulses are 2+ bilaterally  ABDOMEN: Nontender to palpation, normoactive bowel sounds, no rebound/guarding; No hepatosplenomegaly  MUSCLOSKELETAL: no clubbing or cyanosis of digits; no joint swelling or tenderness to palpation  PSYCH: A+O to person, place, and time; affect appropriate  NEURO: no focal deficits, gait remains somewhat unsteady    LABS:                        12.6   12.90 )-----------( 333      ( 05 Jan 2020 07:09 )             39.7     01-04    141  |  102  |  18  ----------------------------<  97  4.0   |  24  |  0.61    Ca    9.2      04 Jan 2020 07:28  Phos  3.2     01-04  Mg     2.0     01-04        Culture - Urine (collected 03 Jan 2020 07:03)  Source: .Urine Clean Catch (Midstream)  Preliminary Report (04 Jan 2020 09:00):    50,000 - 99,000 CFU/mL Gram Negative Rods          RADIOLOGY & ADDITIONAL TESTS:  Results Reviewed:   Imaging Personally Reviewed:  Electrocardiogram Personally Reviewed:    COORDINATION OF CARE:  Care Discussed with Consultants/Other Providers [Y/N]:  Prior or Outpatient Records Reviewed [Y/N]:

## 2020-01-05 NOTE — OCCUPATIONAL THERAPY INITIAL EVALUATION ADULT - BALANCE TRAINING, PT EVAL
Pt will improve dynamic standing balance by 1/2 grade to improve functional performance with ADLs within 1 week.

## 2020-01-05 NOTE — PROGRESS NOTE ADULT - PROBLEM SELECTOR PLAN 5
- Hold home metformin and sulfonylurea while inpatient  - C/w TID AC and Insulin sliding scale  - pt refusing insulin despite education - Hold home metformin and sulfonylurea while inpatient  - C/w TID AC and Insulin sliding scale  - pt refusing insulin despite education  - continue pt on home oral regimen on discharge

## 2020-01-05 NOTE — PROGRESS NOTE ADULT - PROBLEM SELECTOR PLAN 4
- Low suspicion for central vertigo, F/u MRI brain  - Meclizine PRN - Low suspicion for central vertigo, F/u MRI brain  - Meclizine PRN  - plan to discharge pt on 10 days of meclizine PRN

## 2020-01-05 NOTE — PROGRESS NOTE ADULT - PROBLEM SELECTOR PLAN 7
Transitions of Care Status:  1.  Name of PCP: Carlos Fuentes  2.  PCP Contacted on Admission: [ ] Y    [ ] N    3.  PCP contacted at Discharge: [ ] Y    [ ] N    [ ] N/A  4.  Post-Discharge Appointment Date and Location:  5.  Summary of Handoff given to PCP: Transitions of Care Status:  1.  Name of PCP: Carlos Fuentes  2.  PCP Contacted on Admission: [ ] Y    [x] N    3.  PCP contacted at Discharge: [ ] Y    [ ] N    [ ] N/A  4.  Post-Discharge Appointment Date and Location:  5.  Summary of Handoff given to PCP:

## 2020-01-05 NOTE — OCCUPATIONAL THERAPY INITIAL EVALUATION ADULT - PERTINENT HX OF CURRENT PROBLEM, REHAB EVAL
78 y/o F with PMH of T2DM, meningioma (post-resection decades prior), prior CVA, and BPPV presents with one episode of vertigo. Pt states she was walking to the bathroom when she felt a sudden onset of vertigo, lasting approximately 15 minutes and self-resolved.  CT head showed sub-centimeter hypodensity in R cerebellum. Pt pending brain MRI. Pt a/w UTI, age indeterminant R cerebellar infarct, and possible peripheral vertigo. 78 y/o F with PMH of T2DM, meningioma (post-resection decades prior), prior CVA, and BPPV presents with one episode of vertigo. Pt states she was walking to the bathroom when she felt a sudden onset of vertigo, lasting approximately 15 minutes and self-resolved.  CT head showed sub-centimeter hypodensity in R cerebellum. Pt a/w UTI, age indeterminant R cerebellar infarct, and possible peripheral vertigo.

## 2020-01-05 NOTE — OCCUPATIONAL THERAPY INITIAL EVALUATION ADULT - PRECAUTIONS/LIMITATIONS, REHAB EVAL
fall precautions MRI HEAD: No gross evidence for acute infarct or acute hemorrhage. Chronic right cerebellar lacunar infarcts and chronic white matter changes are present as described./fall precautions

## 2020-01-05 NOTE — OCCUPATIONAL THERAPY INITIAL EVALUATION ADULT - LIVES WITH, PROFILE
Pt lives alone in apartment with elevator access and 0 ORION. Pt was independent without AD PTA./alone

## 2020-01-05 NOTE — DISCHARGE NOTE PROVIDER - HOSPITAL COURSE
Patient is an 79F with PMHx of T2DM, meningioma (post-resection decades prior), prior CVA, and BPPV presented with one-day history of vertigo associated with unsteady wide-based gait, and dysuria.  Vertigo was associated with nausea but was not associated with positional head changes, vomiting, changes in hearing or tinnitus.  She was diagnosed  treated several months prior in [facility]                      Patient states approximately one day prior she was walking to the bathroom when she felt a sudden onset of vertigo which she described as room spinning. She stated that it lasted approximately 15 minutes and self-resolved. When it occurred she denied sudden head movement. She lowered herself onto the floor with her back against the wall. She felt slightly nauseous during the episode, but denied any vomiting. She denies changes in hearing or ringing of her ears. Since then she states that her gait as been unsteady and wide-based with the constant need to brace herself despite not having active vertigo. She states that she was diagnosed with vertigo several months prior in [place] and treated with a maneuver.         Per discussion with patient and her descriptions it seems as if she was diagnosed with BPPV and had the Ric-Epperson Sebring maneuver performed with treatment being two sessions of the Epley maneuver. At that time patient also diagnosed with "something in the brain," and what she describes sounds like a stroke for which she was told she needed follow up. She has not had an MRI in the interim. Patient denies nasal congestion, rhinorrhea, cough, or shortness  of breath. She has been having dysuria for several days. She denies polyuria or abdominal pain. She has been taking antibiotics she received from [facility], but unsure of the exact class of antibiotics. She states that her dysuria has not improved despite taking it for several days. She is presenting today because she is concerned about the vertigo. Aside from unsteadiness on feet, patient denies any active vertigo currently. While in the ED patient had CT head which showed sub-centimeter hypodensity in right cerebellum. Neurology was consulted, likely chronic and believe patient may have recrudescence of prior stroke given UTI. Patient is an 79F with PMHx of T2DM, meningioma (post-resection decades prior), prior CVA, and BPPV who presented with one-day history of vertigo with unsteady wide-based gait, and dysuria.  Vertigo was associated with nausea but was not associated with positional head changes, vomiting, changes in hearing or tinnitus.  She was diagnosed and treated for BPPV several months prior in [place] with two sessions of Epley maneuver.  Was also at that point diagnosed with "something in the brain," for which she needed follow-up; pt's description c/w a stroke.  She did not have an MRI in interim.  She had dysuria for several days prior to admission that was not responding to antibiotics she was prescribed in [place].  While in the ED patient had CT head which showed sub-centimeter hypodensity in right cerebellum.  Per Neurology, CT findings likely chronic, patient may be experiencing recrudescence of prior stroke.  Pt with negative Ric-Hallpike maneuver, history more consistent with peripheral vertigo with low suspicion for central vertigo.  Patient also found to have grossly positive UA (>50 WBC, +Bact, +LE), started on ceftriaxone.  Peripheral vertigo was managed with PRN meclizine to good effect.  MRI to further examine CT findings of cerebellar stroke found no gross evidence for acute infarct or acute hemorrhage, with chronic right cerebellar lacunar infarcts and chronic white matter changes.  Patient's gait with some improvement throughout course of hospitalization, PT recommending outpatient PT.  Urine culture positive for pseudomonal UTI, patient treated with 5-day course of levofloxacin PO to be completed outpatient.  Patient is now clinically stable and deemed medically cleared for discharge.

## 2020-01-05 NOTE — PROGRESS NOTE ADULT - ASSESSMENT
79F with PMHx of T2DM, meningioma (post-resection decades prior), prior CVA, and BPPV presents with vertigo and dysuria not improving on antibiotics, found to have UTI 2/2 GNR.

## 2020-01-06 LAB
-  AMIKACIN: SIGNIFICANT CHANGE UP
-  AZTREONAM: SIGNIFICANT CHANGE UP
-  CEFEPIME: SIGNIFICANT CHANGE UP
-  CEFTAZIDIME: SIGNIFICANT CHANGE UP
-  CIPROFLOXACIN: SIGNIFICANT CHANGE UP
-  GENTAMICIN: SIGNIFICANT CHANGE UP
-  IMIPENEM: SIGNIFICANT CHANGE UP
-  LEVOFLOXACIN: SIGNIFICANT CHANGE UP
-  MEROPENEM: SIGNIFICANT CHANGE UP
-  PIPERACILLIN/TAZOBACTAM: SIGNIFICANT CHANGE UP
-  TOBRAMYCIN: SIGNIFICANT CHANGE UP
CULTURE RESULTS: SIGNIFICANT CHANGE UP
METHOD TYPE: SIGNIFICANT CHANGE UP
ORGANISM # SPEC MICROSCOPIC CNT: SIGNIFICANT CHANGE UP
SPECIMEN SOURCE: SIGNIFICANT CHANGE UP

## 2020-03-12 PROBLEM — I63.9 CEREBRAL INFARCTION, UNSPECIFIED: Chronic | Status: ACTIVE | Noted: 2020-01-02

## 2020-04-29 ENCOUNTER — APPOINTMENT (OUTPATIENT)
Dept: UROLOGY | Facility: CLINIC | Age: 80
End: 2020-04-29

## 2020-05-19 NOTE — PROGRESS NOTE ADULT - PROBLEM SELECTOR PLAN 1
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    Eyes: Negative.    Respiratory: Negative.    Cardiovascular: Negative.    Gastrointestinal: Negative.    Genitourinary: Negative.    Musculoskeletal: Negative.    Skin: Negative.    Neurological: Negative.    Endo/Heme/Allergies: Negative.    Psychiatric/Behavioral: Negative.    All other systems reviewed and are negative.      Patient's medications, allergies, past medical, surgical, social and family histories were reviewed and updated as appropriate.    There were no vitals filed for this visit.  Weight    05/19/20 1112   Weight: 66.2 kg (145 lb 15.1 oz)     Body mass index is 24.29 kg/m².    Exam:    Hard fixed mass right angle of jaw  Tender  Palpation of mass mimics the pain that brought him today    Ears are NORMAL    OC/OP normal          Assessment / Plan:    Nasopharyngeal carcinoma (CMS/HCC)  Advanced local disease          Discussion:  Education of the nature of the patient's disease process provided.  Options for treatment discussed and understood.  All questions answered.    Local disease with deep neck and skull base involvement  There is no evidence of otitis  The pain is due to his cancer    Even though he is willing to \"do just about anything\"  I do NOT think that any  surgery is appropriate      Follow up:  With pain management and oncology   - C/w Ceftriaxone (patient with PCN allergy which is rash, tolerated CTX in ED) Dysuria not responding to PO abx, found to have UTI and leukocytosis  -WBC downtrending 15s -> 13s  - C/w Ceftriaxone for at least 5 day course given failed PO treatment and leukocytosis  - Ucx +GNR, will tailor abx to speciation and sensitivities  - CXR clear lungs, procalcitonin not elevated - not pulm source of leukocytosis

## 2020-07-06 DIAGNOSIS — M25.512 PAIN IN RIGHT SHOULDER: ICD-10-CM

## 2020-07-06 DIAGNOSIS — M25.511 PAIN IN RIGHT SHOULDER: ICD-10-CM

## 2020-07-07 ENCOUNTER — APPOINTMENT (OUTPATIENT)
Dept: ORTHOPEDIC SURGERY | Facility: CLINIC | Age: 80
End: 2020-07-07
Payer: MEDICARE

## 2020-07-07 DIAGNOSIS — Z84.1 FAMILY HISTORY OF DISORDERS OF KIDNEY AND URETER: ICD-10-CM

## 2020-07-07 DIAGNOSIS — M25.512 PAIN IN LEFT SHOULDER: ICD-10-CM

## 2020-07-07 DIAGNOSIS — M54.2 CERVICALGIA: ICD-10-CM

## 2020-07-07 DIAGNOSIS — M19.011 PRIMARY OSTEOARTHRITIS, RIGHT SHOULDER: ICD-10-CM

## 2020-07-07 DIAGNOSIS — Z86.39 PERSONAL HISTORY OF OTHER ENDOCRINE, NUTRITIONAL AND METABOLIC DISEASE: ICD-10-CM

## 2020-07-07 DIAGNOSIS — Z82.49 FAMILY HISTORY OF ISCHEMIC HEART DISEASE AND OTHER DISEASES OF THE CIRCULATORY SYSTEM: ICD-10-CM

## 2020-07-07 DIAGNOSIS — Z86.79 PERSONAL HISTORY OF OTHER DISEASES OF THE CIRCULATORY SYSTEM: ICD-10-CM

## 2020-07-07 PROCEDURE — 99204 OFFICE O/P NEW MOD 45 MIN: CPT | Mod: 25

## 2020-07-07 PROCEDURE — 73030 X-RAY EXAM OF SHOULDER: CPT | Mod: RT

## 2020-07-07 PROCEDURE — 72040 X-RAY EXAM NECK SPINE 2-3 VW: CPT

## 2020-07-07 PROCEDURE — 20610 DRAIN/INJ JOINT/BURSA W/O US: CPT | Mod: LT

## 2020-07-07 RX ORDER — METFORMIN HYDROCHLORIDE 500 MG/1
500 TABLET, COATED ORAL
Refills: 0 | Status: ACTIVE | COMMUNITY

## 2020-07-07 RX ORDER — GLIPIZIDE 5 MG/1
5 TABLET ORAL
Refills: 0 | Status: ACTIVE | COMMUNITY

## 2020-11-21 NOTE — DISCHARGE NOTE NURSING/CASE MANAGEMENT/SOCIAL WORK - NURSING SECTION COMPLETE
SUBJECTIVE:   11/21/2020  Chief Complaint:     Subjective      Patient feels better today.  Underwent laparoscopic cholecystectomy today.  Has abdominal soreness. Denied nausea or vomiting.  LFTs are improving.  Bili has normalized.  History:  History reviewed. No pertinent past medical history.  Past Surgical History:   Procedure Laterality Date   • COLONOSCOPY N/A 10/22/2020    Procedure: COLONOSCOPY;  Surgeon: Devon Santoro MD;  Location: [facility] ENDOSCOPY;  Service: Gastroenterology;  Laterality: N/A;   • ENDOSCOPY N/A 10/22/2020    Procedure: ESOPHAGOGASTRODUODENOSCOPY;  Surgeon: Devon Santoro MD;  Location: [facility] ENDOSCOPY;  Service: Gastroenterology;  Laterality: N/A;   • WISDOM TOOTH EXTRACTION  2018     Family History   Problem Relation Age of Onset   • Irritable bowel syndrome Mother    • Bipolar disorder Mother    • Irritable bowel syndrome Father      Social History     Tobacco Use   • Smoking status: Never Smoker   Substance Use Topics   • Alcohol use: Never     Frequency: Never   • Drug use: Not on file     Medications Prior to Admission   Medication Sig Dispense Refill Last Dose   • Ascorbic Acid (VITAMIN C PO) Take  by mouth.   More than a month at Unknown time   • dexlansoprazole (Dexilant) 60 MG capsule Take 1 capsule by mouth Daily. 30 capsule 5 Unknown at Unknown time   • Ferrous Sulfate (IRON PO) Take  by mouth.   More than a month at Unknown time   • Levonorgestrel-Ethinyl Estrad (LARISSIA PO) Take  by mouth.   More than a month at Unknown time     Allergies:  Patient has no known allergies.     CURRENT MEDICATIONS/OBJECTIVE/VS/PE:     Current Medications:     Current Facility-Administered Medications   Medication Dose Route Frequency Provider Last Rate Last Admin   • acetaminophen (TYLENOL) tablet 650 mg  650 mg Oral Q4H PRN Evan Aguirre MD       • cefTRIAXone (ROCEPHIN) 2 g/100 mL 0.9% NS IVPB (MBP)  2 g Intravenous Q24H Evan Aguirre MD   Stopped at  11/21/20 1043   • [START ON 11/22/2020] enoxaparin (LOVENOX) syringe 40 mg  40 mg Subcutaneous Daily Evan Aguirre MD       • HYDROcodone-acetaminophen (NORCO) 5-325 MG per tablet 1 tablet  1 tablet Oral Q4H PRN Evan Aguirre MD       • HYDROmorphone (DILAUDID) injection 0.5 mg  0.5 mg Intravenous Q2H PRN Evan Aguirre MD        And   • naloxone (NARCAN) injection 0.1 mg  0.1 mg Intravenous Q5 Min PRN Evan Aguirre MD       • metroNIDAZOLE (FLAGYL) 500 mg/100mL IVPB  500 mg Intravenous Q8H Evan Aguirre MD   Stopped at 11/21/20 1228   • ondansetron (ZOFRAN) tablet 4 mg  4 mg Oral Q6H PRN Evan Aguirre MD        Or   • ondansetron (ZOFRAN) injection 4 mg  4 mg Intravenous Q6H PRN Evan Aguirre MD       • sodium chloride 0.9 % flush 10 mL  10 mL Intravenous PRN Evan Aguirre MD       • sodium chloride 0.9 % flush 10 mL  10 mL Intravenous Q12H Evan Aguirre MD   10 mL at 11/19/20 2200   • sodium chloride 0.9 % flush 10 mL  10 mL Intravenous PRN Evan Aguirre MD       • sodium chloride 0.9 % infusion  75 mL/hr Intravenous Continuous Evan Aguirre MD 75 mL/hr at 11/21/20 1744 75 mL/hr at 11/21/20 1744       Objective     Review of Systems:   Review of Systems   Constitutional: Negative for chills, fatigue, fever and unexpected weight change.   HENT: Negative for congestion, ear discharge, hearing loss, nosebleeds and sore throat.    Eyes: Negative for pain, discharge and redness.   Respiratory: Negative for cough, chest tightness, shortness of breath and wheezing.    Cardiovascular: Negative for chest pain and palpitations.   Gastrointestinal: Positive for abdominal pain. Negative for abdominal distention, blood in stool, constipation, diarrhea, nausea and vomiting.   Endocrine: Negative for cold intolerance, polydipsia, polyphagia and polyuria.   Genitourinary: Negative for dysuria, flank pain,  frequency, hematuria and urgency.   Musculoskeletal: Negative for arthralgias, back pain, joint swelling and myalgias.   Skin: Negative for color change, pallor and rash.   Neurological: Negative for tremors, seizures, syncope, weakness and headaches.   Hematological: Negative for adenopathy. Does not bruise/bleed easily.   Psychiatric/Behavioral: Negative for behavioral problems, confusion, dysphoric mood, hallucinations and suicidal ideas. The patient is not nervous/anxious.        Physical Exam:   Temp:  [96.5 °F (35.8 °C)-98 °F (36.7 °C)] 96.5 °F (35.8 °C)  Heart Rate:  [55-97] 65  Resp:  [18-20] 18  BP: (112-148)/(68-92) 126/74     Physical Exam:  General Appearance:    Alert, cooperative, in no acute distress   Head:    Normocephalic, without obvious abnormality, atraumatic   Eyes:            Lids and lashes normal, conjunctivae and sclerae normal, no   icterus, no pallor, corneas clear, PERRLA   Ears:    Ears appear intact with no abnormalities noted   Throat:   No oral lesions, no thrush, oral mucosa moist   Neck:   No adenopathy, supple, trachea midline, no thyromegaly, no     carotid bruit, no JVD   Back:     No kyphosis present, no scoliosis present, no skin lesions,       erythema or scars, no tenderness to percussion or                   palpation,   range of motion normal   Lungs:     Clear to auscultation,respirations regular, even and                   unlabored    Heart:    Regular rhythm and normal rate, normal S1 and S2, no            murmur, no gallop, no rub, no click   Breast Exam:    Deferred   Abdomen:     Normal bowel sounds, no masses, no organomegaly, soft        nontender, nondistended, no guarding, no rebound                 tenderness   Genitalia:    Deferred   Extremities:   Moves all extremities well, no edema, no cyanosis, no              redness   Pulses:   Pulses palpable and equal bilaterally   Skin:   No bleeding, bruising or rash   Lymph nodes:   No palpable adenopathy    Neurologic:   Cranial nerves 2 - 12 grossly intact, sensation intact, DTR        present and equal bilaterally      Results Review:     Lab Results (last 24 hours)     Procedure Component Value Units Date/Time    Comprehensive Metabolic Panel [[id number]]  (Abnormal) Collected: 11/21/20 0634    Specimen: Blood Updated: 11/21/20 0742     Glucose 72 mg/dL      BUN 12 mg/dL      Creatinine 0.71 mg/dL      Sodium 136 mmol/L      Potassium 3.7 mmol/L      Chloride 102 mmol/L      CO2 23.0 mmol/L      Calcium 9.5 mg/dL      Total Protein 6.9 g/dL      Albumin 4.10 g/dL      ALT (SGPT) 381 U/L      AST (SGOT) 106 U/L      Alkaline Phosphatase 131 U/L      Total Bilirubin 0.6 mg/dL      eGFR Non African Amer 103 mL/min/1.73      Globulin 2.8 gm/dL      A/G Ratio 1.5 g/dL      BUN/Creatinine Ratio 16.9     Anion Gap 11.0 mmol/L     Narrative:      GFR Normal >60  Chronic Kidney Disease <60  Kidney Failure <15      Lipase [[id number]]  (Abnormal) Collected: 11/21/20 0634    Specimen: Blood Updated: 11/21/20 0742     Lipase 86 U/L     CBC & Differential [[id number]]  (Normal) Collected: 11/21/20 0634    Specimen: Blood Updated: 11/21/20 0722    Narrative:      The following orders were created for panel order CBC & Differential.  Procedure                               Abnormality         Status                     ---------                               -----------         ------                     CBC Auto Differential[[id number]]        Normal              Final result                 Please view results for these tests on the individual orders.    CBC Auto Differential [[id number]]  (Normal) Collected: 11/21/20 0634    Specimen: Blood Updated: 11/21/20 0722     WBC 5.12 10*3/mm3      RBC 4.49 10*6/mm3      Hemoglobin 12.2 g/dL      Hematocrit 38.1 %      MCV 84.9 fL      MCH 27.2 pg      MCHC 32.0 g/dL      RDW 12.6 %      RDW-SD 38.5 fl      MPV 10.4 fL      Platelets 199 10*3/mm3      Neutrophil % 50.2 %      Lymphocyte %  40.8 %      Monocyte % 5.9 %      Eosinophil % 2.3 %      Basophil % 0.6 %      Immature Grans % 0.2 %      Neutrophils, Absolute 2.57 10*3/mm3      Lymphocytes, Absolute 2.09 10*3/mm3      Monocytes, Absolute 0.30 10*3/mm3      Eosinophils, Absolute 0.12 10*3/mm3      Basophils, Absolute 0.03 10*3/mm3      Immature Grans, Absolute 0.01 10*3/mm3      nRBC 0.0 /100 WBC            I reviewed the patient's new clinical results.  I reviewed the patient's new imaging results and agree with the interpretation.     ASSESSMENT/PLAN:   ASSESSMENT: 1.  Acute gallstone pancreatitis, resolving.  2.  Cholelithiasis, status post cholecystectomy.  MRCP was unremarkable for choledocholithiasis.  3.  Elevated liver enzymes, improving.    PLAN: 1.  Continue current postop care  2.  Follow LFTs closely  The risks, benefits, and alternatives of this procedure have been discussed with the patient or the responsible party- the patient understands and agrees to proceed.         Emile Taylor MD  11/21/20  17:52 CST         Patient/Caregiver provided printed discharge information.

## 2021-12-10 ENCOUNTER — EMERGENCY (EMERGENCY)
Facility: HOSPITAL | Age: 81
LOS: 1 days | End: 2021-12-10
Payer: MEDICARE

## 2021-12-10 VITALS
RESPIRATION RATE: 20 BRPM | WEIGHT: 169.98 LBS | OXYGEN SATURATION: 98 % | SYSTOLIC BLOOD PRESSURE: 105 MMHG | HEIGHT: 60 IN | DIASTOLIC BLOOD PRESSURE: 67 MMHG | HEART RATE: 90 BPM | TEMPERATURE: 98 F

## 2021-12-10 VITALS
DIASTOLIC BLOOD PRESSURE: 65 MMHG | HEART RATE: 91 BPM | SYSTOLIC BLOOD PRESSURE: 115 MMHG | RESPIRATION RATE: 20 BRPM | TEMPERATURE: 98 F | OXYGEN SATURATION: 97 %

## 2021-12-10 PROCEDURE — L9991: CPT

## 2022-10-18 ENCOUNTER — INPATIENT (INPATIENT)
Facility: HOSPITAL | Age: 82
LOS: 12 days | Discharge: HOME | DRG: 562 | End: 2022-10-31
Attending: INTERNAL MEDICINE | Admitting: INTERNAL MEDICINE
Payer: MEDICARE

## 2022-10-18 VITALS
HEART RATE: 101 BPM | TEMPERATURE: 98 F | HEIGHT: 64 IN | DIASTOLIC BLOOD PRESSURE: 76 MMHG | OXYGEN SATURATION: 97 % | SYSTOLIC BLOOD PRESSURE: 124 MMHG | RESPIRATION RATE: 18 BRPM | WEIGHT: 149.91 LBS

## 2022-10-18 LAB
ALBUMIN SERPL ELPH-MCNC: 3.2 G/DL — LOW (ref 3.5–5)
ALP SERPL-CCNC: 66 U/L — SIGNIFICANT CHANGE UP (ref 40–120)
ALT FLD-CCNC: 17 U/L DA — SIGNIFICANT CHANGE UP (ref 10–60)
ANION GAP SERPL CALC-SCNC: 7 MMOL/L — SIGNIFICANT CHANGE UP (ref 5–17)
AST SERPL-CCNC: 20 U/L — SIGNIFICANT CHANGE UP (ref 10–40)
BASOPHILS # BLD AUTO: 0.08 K/UL — SIGNIFICANT CHANGE UP (ref 0–0.2)
BASOPHILS NFR BLD AUTO: 0.4 % — SIGNIFICANT CHANGE UP (ref 0–2)
BILIRUB SERPL-MCNC: 0.6 MG/DL — SIGNIFICANT CHANGE UP (ref 0.2–1.2)
BUN SERPL-MCNC: 18 MG/DL — SIGNIFICANT CHANGE UP (ref 7–18)
CALCIUM SERPL-MCNC: 9 MG/DL — SIGNIFICANT CHANGE UP (ref 8.4–10.5)
CHLORIDE SERPL-SCNC: 102 MMOL/L — SIGNIFICANT CHANGE UP (ref 96–108)
CK SERPL-CCNC: 277 U/L — HIGH (ref 21–215)
CO2 SERPL-SCNC: 26 MMOL/L — SIGNIFICANT CHANGE UP (ref 22–31)
CREAT SERPL-MCNC: 0.66 MG/DL — SIGNIFICANT CHANGE UP (ref 0.5–1.3)
EGFR: 87 ML/MIN/1.73M2 — SIGNIFICANT CHANGE UP
EOSINOPHIL # BLD AUTO: 0 K/UL — SIGNIFICANT CHANGE UP (ref 0–0.5)
EOSINOPHIL NFR BLD AUTO: 0 % — SIGNIFICANT CHANGE UP (ref 0–6)
GLUCOSE SERPL-MCNC: 171 MG/DL — HIGH (ref 70–99)
HCT VFR BLD CALC: 41.1 % — SIGNIFICANT CHANGE UP (ref 34.5–45)
HGB BLD-MCNC: 13.1 G/DL — SIGNIFICANT CHANGE UP (ref 11.5–15.5)
IMM GRANULOCYTES NFR BLD AUTO: 0.7 % — SIGNIFICANT CHANGE UP (ref 0–0.9)
LYMPHOCYTES # BLD AUTO: 13.7 % — SIGNIFICANT CHANGE UP (ref 13–44)
LYMPHOCYTES # BLD AUTO: 2.45 K/UL — SIGNIFICANT CHANGE UP (ref 1–3.3)
MCHC RBC-ENTMCNC: 29.4 PG — SIGNIFICANT CHANGE UP (ref 27–34)
MCHC RBC-ENTMCNC: 31.9 GM/DL — LOW (ref 32–36)
MCV RBC AUTO: 92.4 FL — SIGNIFICANT CHANGE UP (ref 80–100)
MONOCYTES # BLD AUTO: 1.45 K/UL — HIGH (ref 0–0.9)
MONOCYTES NFR BLD AUTO: 8.1 % — SIGNIFICANT CHANGE UP (ref 2–14)
NEUTROPHILS # BLD AUTO: 13.8 K/UL — HIGH (ref 1.8–7.4)
NEUTROPHILS NFR BLD AUTO: 77.1 % — HIGH (ref 43–77)
NRBC # BLD: 0 /100 WBCS — SIGNIFICANT CHANGE UP (ref 0–0)
PLATELET # BLD AUTO: 323 K/UL — SIGNIFICANT CHANGE UP (ref 150–400)
POTASSIUM SERPL-MCNC: 3.4 MMOL/L — LOW (ref 3.5–5.3)
POTASSIUM SERPL-SCNC: 3.4 MMOL/L — LOW (ref 3.5–5.3)
PROT SERPL-MCNC: 7.3 G/DL — SIGNIFICANT CHANGE UP (ref 6–8.3)
RBC # BLD: 4.45 M/UL — SIGNIFICANT CHANGE UP (ref 3.8–5.2)
RBC # FLD: 14 % — SIGNIFICANT CHANGE UP (ref 10.3–14.5)
SODIUM SERPL-SCNC: 135 MMOL/L — SIGNIFICANT CHANGE UP (ref 135–145)
TROPONIN I, HIGH SENSITIVITY RESULT: 8 NG/L — SIGNIFICANT CHANGE UP
WBC # BLD: 17.9 K/UL — HIGH (ref 3.8–10.5)
WBC # FLD AUTO: 17.9 K/UL — HIGH (ref 3.8–10.5)

## 2022-10-18 PROCEDURE — 99285 EMERGENCY DEPT VISIT HI MDM: CPT

## 2022-10-18 RX ORDER — SODIUM CHLORIDE 9 MG/ML
500 INJECTION INTRAMUSCULAR; INTRAVENOUS; SUBCUTANEOUS ONCE
Refills: 0 | Status: COMPLETED | OUTPATIENT
Start: 2022-10-18 | End: 2022-10-18

## 2022-10-18 RX ADMIN — SODIUM CHLORIDE 500 MILLILITER(S): 9 INJECTION INTRAMUSCULAR; INTRAVENOUS; SUBCUTANEOUS at 21:34

## 2022-10-18 NOTE — ED PROVIDER NOTE - CLINICAL SUMMARY MEDICAL DECISION MAKING FREE TEXT BOX
82 yr old female with hx of vertigo presents to ed for unwitnessed fall x 1 day ago and was unable to get up until ems arrived to house today. pt states she was walking  from kitchen to bedroom and is only a few steps normally but felt weird and then realized she was on floor. pt denies any ha, cp, sob, focal weakness, visual changes. pt neighbor called ems. today had vertigo    syncope/fall possibly due to vertigo vs arrhythmia vs seizure? labs, ekg, cardiac monitor, xr, ct, admit

## 2022-10-18 NOTE — ED PROVIDER NOTE - PROGRESS NOTE DETAILS
Mueller: xr shows a displaced humeral neck fracture without dislocation. pt neurovascularly intact. will attempt to realign and place in sling then admit Mueller: after discussion with colleague - performing reduction will not be beneficial in the ed. will wait for ortho to eval. no tenting of skin. pt pain localized at anterior shoulder, radial pulse 2+, moving extremity minus the shoulder joint. will admit once ct is done. Mueller: will contact ortho for eval of humeral neck fracture, angulated/displaced

## 2022-10-18 NOTE — ED ADULT NURSE NOTE - OBJECTIVE STATEMENT
Patient states "she fell at home after leaving the kitchen and entering bedroom. and suddenly fell and  felt something in rt shoulder- I had no connection  .

## 2022-10-18 NOTE — ED PROVIDER NOTE - MUSCULOSKELETAL, MLM
Spine appears normal, range of motion is not limited, no muscle. right shoulder ecchymosis and hematoma, unable to move. otherwise no pelvic instability, ambulatory

## 2022-10-18 NOTE — ED ADULT NURSE NOTE - ISOLATION TYPE:
Mildly to Moderately Impaired: difficulty hearing in some environments or speaker may need to increase volume or speak distinctly
None

## 2022-10-18 NOTE — ED PROVIDER NOTE - OBJECTIVE STATEMENT
82 yr old female with hx of vertigo presents to ed for unwitnessed fall x 1 day ago and was unable to get up until ems arrived to house today. pt states she was walking  from kitchen to bedroom and is only a few steps normally but felt weird and then realized she was on floor. pt denies any ha, cp, sob, focal weakness, visual changes. pt neighbor called ems. today had vertigo

## 2022-10-18 NOTE — ED ADULT NURSE NOTE - ED STAT RN HANDOFF DETAILS
pt aox4, stable. isolation continued. handoff given to esperanza cabrales to monitor and f/u with poc.

## 2022-10-18 NOTE — ED PROVIDER NOTE - CONSTITUTIONAL, MLM
awake, alert, oriented to person, place, time/situation and in no apparent distress. unkept normal...

## 2022-10-18 NOTE — ED ADULT TRIAGE NOTE - CHIEF COMPLAINT QUOTE
Patient states " I fell the other night, I don't know how, I don't know why "  Patient was on the floor since last night, denies head injury, denies passing out.  Patient states neighbor called 911

## 2022-10-19 DIAGNOSIS — E78.5 HYPERLIPIDEMIA, UNSPECIFIED: ICD-10-CM

## 2022-10-19 DIAGNOSIS — U07.1 COVID-19: ICD-10-CM

## 2022-10-19 DIAGNOSIS — Z29.9 ENCOUNTER FOR PROPHYLACTIC MEASURES, UNSPECIFIED: ICD-10-CM

## 2022-10-19 DIAGNOSIS — R55 SYNCOPE AND COLLAPSE: ICD-10-CM

## 2022-10-19 DIAGNOSIS — S42.309A UNSPECIFIED FRACTURE OF SHAFT OF HUMERUS, UNSPECIFIED ARM, INITIAL ENCOUNTER FOR CLOSED FRACTURE: ICD-10-CM

## 2022-10-19 DIAGNOSIS — E11.9 TYPE 2 DIABETES MELLITUS WITHOUT COMPLICATIONS: ICD-10-CM
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
## 2022-10-19 DIAGNOSIS — Z98.890 OTHER SPECIFIED POSTPROCEDURAL STATES: Chronic | ICD-10-CM

## 2022-10-19 DIAGNOSIS — I10 ESSENTIAL (PRIMARY) HYPERTENSION: ICD-10-CM

## 2022-10-19 LAB
A1C WITH ESTIMATED AVERAGE GLUCOSE RESULT: 6.1 % — HIGH (ref 4–5.6)
ANION GAP SERPL CALC-SCNC: 10 MMOL/L — SIGNIFICANT CHANGE UP (ref 5–17)
BUN SERPL-MCNC: 20 MG/DL — HIGH (ref 7–18)
CALCIUM SERPL-MCNC: 9.2 MG/DL — SIGNIFICANT CHANGE UP (ref 8.4–10.5)
CHLORIDE SERPL-SCNC: 103 MMOL/L — SIGNIFICANT CHANGE UP (ref 96–108)
CO2 SERPL-SCNC: 25 MMOL/L — SIGNIFICANT CHANGE UP (ref 22–31)
CREAT SERPL-MCNC: 0.68 MG/DL — SIGNIFICANT CHANGE UP (ref 0.5–1.3)
D DIMER BLD IA.RAPID-MCNC: 1258 NG/ML DDU — HIGH
EGFR: 86 ML/MIN/1.73M2 — SIGNIFICANT CHANGE UP
ESTIMATED AVERAGE GLUCOSE: 128 MG/DL — HIGH (ref 68–114)
GLUCOSE BLDC GLUCOMTR-MCNC: 136 MG/DL — HIGH (ref 70–99)
GLUCOSE BLDC GLUCOMTR-MCNC: 142 MG/DL — HIGH (ref 70–99)
GLUCOSE BLDC GLUCOMTR-MCNC: 159 MG/DL — HIGH (ref 70–99)
GLUCOSE SERPL-MCNC: 144 MG/DL — HIGH (ref 70–99)
HCT VFR BLD CALC: 36.7 % — SIGNIFICANT CHANGE UP (ref 34.5–45)
HGB BLD-MCNC: 12 G/DL — SIGNIFICANT CHANGE UP (ref 11.5–15.5)
MAGNESIUM SERPL-MCNC: 2.1 MG/DL — SIGNIFICANT CHANGE UP (ref 1.6–2.6)
MCHC RBC-ENTMCNC: 29.9 PG — SIGNIFICANT CHANGE UP (ref 27–34)
MCHC RBC-ENTMCNC: 32.7 GM/DL — SIGNIFICANT CHANGE UP (ref 32–36)
MCV RBC AUTO: 91.5 FL — SIGNIFICANT CHANGE UP (ref 80–100)
NRBC # BLD: 0 /100 WBCS — SIGNIFICANT CHANGE UP (ref 0–0)
PHOSPHATE SERPL-MCNC: 2.6 MG/DL — SIGNIFICANT CHANGE UP (ref 2.5–4.5)
PLATELET # BLD AUTO: 297 K/UL — SIGNIFICANT CHANGE UP (ref 150–400)
POTASSIUM SERPL-MCNC: 3.5 MMOL/L — SIGNIFICANT CHANGE UP (ref 3.5–5.3)
POTASSIUM SERPL-SCNC: 3.5 MMOL/L — SIGNIFICANT CHANGE UP (ref 3.5–5.3)
RBC # BLD: 4.01 M/UL — SIGNIFICANT CHANGE UP (ref 3.8–5.2)
RBC # FLD: 14 % — SIGNIFICANT CHANGE UP (ref 10.3–14.5)
SARS-COV-2 RNA SPEC QL NAA+PROBE: DETECTED
SODIUM SERPL-SCNC: 138 MMOL/L — SIGNIFICANT CHANGE UP (ref 135–145)
TSH SERPL-MCNC: 5.99 UU/ML — HIGH (ref 0.34–4.82)
WBC # BLD: 15.06 K/UL — HIGH (ref 3.8–10.5)
WBC # FLD AUTO: 15.06 K/UL — HIGH (ref 3.8–10.5)

## 2022-10-19 PROCEDURE — 71045 X-RAY EXAM CHEST 1 VIEW: CPT | Mod: 26

## 2022-10-19 PROCEDURE — 99223 1ST HOSP IP/OBS HIGH 75: CPT | Mod: GC

## 2022-10-19 PROCEDURE — 70450 CT HEAD/BRAIN W/O DYE: CPT | Mod: 26,MA

## 2022-10-19 PROCEDURE — 72125 CT NECK SPINE W/O DYE: CPT | Mod: 26,MA

## 2022-10-19 PROCEDURE — 73030 X-RAY EXAM OF SHOULDER: CPT | Mod: 26,RT

## 2022-10-19 PROCEDURE — 73060 X-RAY EXAM OF HUMERUS: CPT | Mod: 26,RT

## 2022-10-19 RX ORDER — ACETAMINOPHEN 500 MG
650 TABLET ORAL EVERY 8 HOURS
Refills: 0 | Status: DISCONTINUED | OUTPATIENT
Start: 2022-10-19 | End: 2022-10-21

## 2022-10-19 RX ORDER — LANOLIN ALCOHOL/MO/W.PET/CERES
3 CREAM (GRAM) TOPICAL AT BEDTIME
Refills: 0 | Status: DISCONTINUED | OUTPATIENT
Start: 2022-10-19 | End: 2022-10-31

## 2022-10-19 RX ORDER — TRAMADOL HYDROCHLORIDE 50 MG/1
25 TABLET ORAL EVERY 6 HOURS
Refills: 0 | Status: DISCONTINUED | OUTPATIENT
Start: 2022-10-19 | End: 2022-10-21

## 2022-10-19 RX ORDER — METOPROLOL TARTRATE 50 MG
0.5 TABLET ORAL
Qty: 0 | Refills: 0 | DISCHARGE

## 2022-10-19 RX ORDER — INSULIN LISPRO 100/ML
VIAL (ML) SUBCUTANEOUS
Refills: 0 | Status: DISCONTINUED | OUTPATIENT
Start: 2022-10-19 | End: 2022-10-31

## 2022-10-19 RX ORDER — SENNA PLUS 8.6 MG/1
2 TABLET ORAL AT BEDTIME
Refills: 0 | Status: DISCONTINUED | OUTPATIENT
Start: 2022-10-19 | End: 2022-10-31

## 2022-10-19 RX ORDER — METOPROLOL TARTRATE 50 MG
12.5 TABLET ORAL DAILY
Refills: 0 | Status: DISCONTINUED | OUTPATIENT
Start: 2022-10-19 | End: 2022-10-31

## 2022-10-19 RX ORDER — SEMAGLUTIDE 0.68 MG/ML
1 INJECTION, SOLUTION SUBCUTANEOUS
Qty: 0 | Refills: 0 | DISCHARGE

## 2022-10-19 RX ORDER — ACETAMINOPHEN 500 MG
650 TABLET ORAL EVERY 6 HOURS
Refills: 0 | Status: DISCONTINUED | OUTPATIENT
Start: 2022-10-19 | End: 2022-10-19

## 2022-10-19 RX ORDER — DOCUSATE SODIUM 100 MG
1 CAPSULE ORAL
Qty: 0 | Refills: 0 | DISCHARGE

## 2022-10-19 RX ORDER — ENOXAPARIN SODIUM 100 MG/ML
40 INJECTION SUBCUTANEOUS EVERY 24 HOURS
Refills: 0 | Status: DISCONTINUED | OUTPATIENT
Start: 2022-10-19 | End: 2022-10-20

## 2022-10-19 RX ORDER — METOPROLOL TARTRATE 50 MG
6.25 TABLET ORAL
Refills: 0 | Status: DISCONTINUED | OUTPATIENT
Start: 2022-10-19 | End: 2022-10-19

## 2022-10-19 RX ORDER — METFORMIN HYDROCHLORIDE 850 MG/1
1 TABLET ORAL
Qty: 0 | Refills: 0 | DISCHARGE

## 2022-10-19 RX ORDER — PANTOPRAZOLE SODIUM 20 MG/1
1 TABLET, DELAYED RELEASE ORAL
Qty: 0 | Refills: 0 | DISCHARGE

## 2022-10-19 RX ORDER — ERGOCALCIFEROL 1.25 MG/1
1 CAPSULE ORAL
Qty: 0 | Refills: 0 | DISCHARGE

## 2022-10-19 RX ORDER — LIDOCAINE 4 G/100G
1 CREAM TOPICAL DAILY
Refills: 0 | Status: DISCONTINUED | OUTPATIENT
Start: 2022-10-19 | End: 2022-10-31

## 2022-10-19 RX ORDER — ATORVASTATIN CALCIUM 80 MG/1
40 TABLET, FILM COATED ORAL AT BEDTIME
Refills: 0 | Status: DISCONTINUED | OUTPATIENT
Start: 2022-10-19 | End: 2022-10-31

## 2022-10-19 RX ORDER — SENNA PLUS 8.6 MG/1
1 TABLET ORAL
Qty: 0 | Refills: 0 | DISCHARGE

## 2022-10-19 RX ORDER — ATORVASTATIN CALCIUM 80 MG/1
1 TABLET, FILM COATED ORAL
Qty: 0 | Refills: 0 | DISCHARGE

## 2022-10-19 RX ORDER — INSULIN LISPRO 100/ML
VIAL (ML) SUBCUTANEOUS AT BEDTIME
Refills: 0 | Status: DISCONTINUED | OUTPATIENT
Start: 2022-10-19 | End: 2022-10-31

## 2022-10-19 RX ORDER — ONDANSETRON 8 MG/1
4 TABLET, FILM COATED ORAL EVERY 8 HOURS
Refills: 0 | Status: DISCONTINUED | OUTPATIENT
Start: 2022-10-19 | End: 2022-10-31

## 2022-10-19 RX ORDER — SODIUM CHLORIDE 9 MG/ML
1000 INJECTION INTRAMUSCULAR; INTRAVENOUS; SUBCUTANEOUS
Refills: 0 | Status: DISCONTINUED | OUTPATIENT
Start: 2022-10-19 | End: 2022-10-20

## 2022-10-19 RX ORDER — PANTOPRAZOLE SODIUM 20 MG/1
40 TABLET, DELAYED RELEASE ORAL
Refills: 0 | Status: DISCONTINUED | OUTPATIENT
Start: 2022-10-19 | End: 2022-10-31

## 2022-10-19 RX ORDER — PROPOFOL 10 MG/ML
70 INJECTION, EMULSION INTRAVENOUS ONCE
Refills: 0 | Status: DISCONTINUED | OUTPATIENT
Start: 2022-10-19 | End: 2022-10-19

## 2022-10-19 RX ADMIN — SODIUM CHLORIDE 75 MILLILITER(S): 9 INJECTION INTRAMUSCULAR; INTRAVENOUS; SUBCUTANEOUS at 11:00

## 2022-10-19 RX ADMIN — TRAMADOL HYDROCHLORIDE 25 MILLIGRAM(S): 50 TABLET ORAL at 17:44

## 2022-10-19 RX ADMIN — Medication 650 MILLIGRAM(S): at 21:29

## 2022-10-19 RX ADMIN — SENNA PLUS 2 TABLET(S): 8.6 TABLET ORAL at 21:28

## 2022-10-19 RX ADMIN — Medication 650 MILLIGRAM(S): at 13:14

## 2022-10-19 RX ADMIN — Medication 650 MILLIGRAM(S): at 21:58

## 2022-10-19 RX ADMIN — LIDOCAINE 1 PATCH: 4 CREAM TOPICAL at 10:59

## 2022-10-19 RX ADMIN — Medication 2: at 17:45

## 2022-10-19 RX ADMIN — SODIUM CHLORIDE 75 MILLILITER(S): 9 INJECTION INTRAMUSCULAR; INTRAVENOUS; SUBCUTANEOUS at 21:25

## 2022-10-19 RX ADMIN — ENOXAPARIN SODIUM 40 MILLIGRAM(S): 100 INJECTION SUBCUTANEOUS at 10:59

## 2022-10-19 RX ADMIN — Medication 650 MILLIGRAM(S): at 10:59

## 2022-10-19 RX ADMIN — TRAMADOL HYDROCHLORIDE 25 MILLIGRAM(S): 50 TABLET ORAL at 15:57

## 2022-10-19 RX ADMIN — ATORVASTATIN CALCIUM 40 MILLIGRAM(S): 80 TABLET, FILM COATED ORAL at 21:28

## 2022-10-19 NOTE — H&P ADULT - NSICDXFAMILYHX_GEN_ALL_CORE_FT
FAMILY HISTORY:  Father  Still living? Unknown  FH: heart attack, Age at diagnosis: Age Unknown    Child  Still living? Unknown  FH: heart disease, Age at diagnosis: Age Unknown

## 2022-10-19 NOTE — H&P ADULT - NSHPREVIEWOFSYSTEMS_GEN_ALL_CORE
REVIEW OF SYSTEMS:    CONSTITUTIONAL: + weakness. No fevers or chills  EYES/ENT: + vertigo. No visual changes;  No throat pain   NECK: No pain or stiffness  RESPIRATORY: No cough, wheezing, hemoptysis; No shortness of breath  CARDIOVASCULAR: No chest pain or palpitations  GASTROINTESTINAL: No abdominal or epigastric pain. No nausea, vomiting, or hematemesis; No diarrhea or constipation. No melena or hematochezia.  GENITOURINARY: No dysuria, frequency or hematuria  MUSCULOSKELETAL: + right shoulder pain  NEUROLOGICAL: No numbness or weakness  SKIN: No itching, rashes

## 2022-10-19 NOTE — H&P ADULT - HISTORY OF PRESENT ILLNESS
82 year old female, from home, ambulates with cane, PMH HTN, HLD, DM, prior brain surgery (benign meningioma), prior cerebellar CVA, presents to the ED after fall at home. Pt states that 2 nights ago, she was walking to her bedroom and felt as if the distance was further. She states it was dark and she fell. Pt was not able to get up on her own. She states she felt vertigo, "the room spinning" after she fell and is not sure if she lost consciousness or not. She states she has 6/10 pain in her right shoulder. Her neighbor called EMS the next day when she checked in on her. Pt found to be COVID+ in the ED. She states she has post-nasal drip and minor chills but otherwise denies headache, fever, cough, shortness of breath, sore throat, chest pain, palpitations, abdominal pain, diarrhea, constipation or dysuria.    In ED: vitals /69, HR 96, Temp 98.1F, 98% on RA  CT Head and Cervical Spine shows no acute intracranial CT abnormality. No evidence of acute cervical spine fracture or traumatic malalignment, within the limitations of motion artifact. Hemorrhage in the right axillary region, secondary to displaced proximal humeral fracture demonstrated on the shoulder radiographs.  s/p 500cc IVF NS bolus in ED 82 year old female, from home, ambulates with cane, PMH HTN, HLD, DM, prior brain surgery (benign meningioma), prior cerebellar CVA, presents to the ED after fall at home. Pt states that 2 nights ago, she was walking to her bedroom and felt as if the distance was further. She states it was dark and she fell. Pt was not able to get up on her own. She states she felt vertigo, "the room spinning" after she fell and is not sure if she lost consciousness or not. She states she has 6/10 pain in her right shoulder. Her neighbor called EMS the next day when she checked in on her. Pt states that she recently traveled back from [place] a couple weeks ago. She also recently stopped taking Ozempic 1 week ago due to symptoms of nausea and diarrhea which she attributed to the medication. Pt found to be COVID+ in the ED. She states she has post-nasal drip and minor chills but otherwise denies headache, fever, cough, shortness of breath, sore throat, chest pain, palpitations, abdominal pain, diarrhea, constipation or dysuria.    In ED: vitals /69, HR 96, Temp 98.1F, 98% on RA  CT Head and Cervical Spine shows no acute intracranial CT abnormality. No evidence of acute cervical spine fracture or traumatic malalignment, within the limitations of motion artifact. Hemorrhage in the right axillary region, secondary to displaced proximal humeral fracture demonstrated on the shoulder radiographs.  s/p 500cc IVF NS bolus in ED

## 2022-10-19 NOTE — H&P ADULT - NSHPPHYSICALEXAM_GEN_ALL_CORE
VITALS:   T(C): 36.7 (10-19-22 @ 07:30), Max: 37.3 (10-19-22 @ 00:16)  HR: 96 (10-19-22 @ 07:30) (95 - 101)  BP: 119/69 (10-19-22 @ 07:30) (119/69 - 131/81)  RR: 18 (10-19-22 @ 07:30) (18 - 18)  SpO2: 98% (10-19-22 @ 07:30) (96% - 98%)    GENERAL: NAD, elderly female lying in bed comfortably  HEAD:  Atraumatic, Normocephalic  EYES: EOMI, PERRLA, conjunctiva and sclera clear  ENT: Dry mucous membranes  NECK: Supple, No JVD  CHEST/LUNG: Clear to auscultation bilaterally; No rales, rhonchi, wheezing, or rubs. Unlabored respirations  HEART: Regular rate and rhythm; No murmurs, rubs, or gallops  ABDOMEN: BSx4; Soft, nontender, nondistended  EXTREMITIES: + right anterior shoulder ecchymoses and hematoma, pain on ROM. 2+ Peripheral Pulses, brisk capillary refill. No clubbing, cyanosis, or edema  NERVOUS SYSTEM:  A&Ox3, no focal deficits   SKIN: No rashes or lesions

## 2022-10-19 NOTE — H&P ADULT - ATTENDING COMMENTS
Patient seen and examined; Discussed with PGY1 HAYDEE WOODRUFF    82 year old female, from home, ambulates with cane, PMH HTN, HLD, DM, prior brain surgery (benign meningioma), prior cerebellar CVA, presents to the ED after fall at home. Pt states that 2 nights ago, she was walking to her bedroom and felt as if the distance was further. She states it was dark and she fell. Pt was not able to get up on her own. She states she felt vertigo, "the room spinning" after she fell and is not sure if she lost consciousness or not. She states she has 6/10 pain in her right shoulder. Her neighbor called EMS the next day when she checked in on her. Pt found to be COVID+ in the ED. Also with Right shoulder fracture.     Vital Signs Last 24 Hrs  T(C): 36.7 (19 Oct 2022 07:30), Max: 37.3 (19 Oct 2022 00:16)  T(F): 98.1 (19 Oct 2022 07:30), Max: 99.1 (19 Oct 2022 00:16)  HR: 96 (19 Oct 2022 07:30) (95 - 101)  BP: 119/69 (19 Oct 2022 07:30) (119/69 - 131/81)  RR: 18 (19 Oct 2022 07:30) (18 - 18)  SpO2: 98% (19 Oct 2022 07:30) (96% - 98%) room air    P/E:  elderly female, comfortable at rest , NAD  Psych: AAO x3  Neuro: No gross focal deficits; Power and sensation intact  CVS: S1S2 present, regular, no edema  Resp: BLAE+, No wheeze or Rhonchi  GI: Soft, BS+, Non tender, non distended  Extr: No  calf tenderness B/L Lower extremities  Right shoulder tenderness and limited ROM due to fracture  Skin: Warm and moist without any rashes    Labs:                        12.0   15.06 )-----------( 297      ( 19 Oct 2022 10:12 )             36.7   10-19    138  |  103  |  20<H>  ----------------------------<  144<H>  3.5   |  25  |  0.68    Ca    9.2      19 Oct 2022 10:12  Phos  2.6     10-19  Mg     2.1     10-19    TPro  7.3  /  Alb  3.2<L>  /  TBili  0.6  /  DBili  x   /  AST  20  /  ALT  17  /  AlkPhos  66  10-18    D/D: Patient seen and examined; Discussed with PGY1 HAYDEE WOODRUFF    82 year old female, from home, ambulates with cane, PMH HTN, HLD, DM, prior brain surgery (benign meningioma), prior cerebellar CVA, presents to the ED after fall at home. Pt states that 2 nights ago, she was walking to her bedroom and felt as if the distance was further. She states it was dark and she fell. Pt was not able to get up on her own. She states she felt vertigo, "the room spinning" after she fell and is not sure if she lost consciousness or not. She states she has 6/10 pain in her right shoulder. Her neighbor called EMS the next day when she checked in on her. Pt found to be COVID+ in the ED. Also with Right shoulder fracture.     No fever. No urinary symptoms.   denies fever, chills, SOB, palpitations, chest pain, nausea, vomiting, diarrhea, constipation, dizziness      Asper patient she was away to [facility] in August to September after Son passed away. She just returned at the end of September. She reported also stop taking Ozempic last week after she had Nausea and stomach upset but has been tolerating well for last few months.    SH/FH: As above; Lives alone; Grandkids visit; independent ADLs; non smoker; no alcohol    Vital Signs Last 24 Hrs  T(C): 36.7 (19 Oct 2022 07:30), Max: 37.3 (19 Oct 2022 00:16)  T(F): 98.1 (19 Oct 2022 07:30), Max: 99.1 (19 Oct 2022 00:16)  HR: 96 (19 Oct 2022 07:30) (95 - 101)  BP: 119/69 (19 Oct 2022 07:30) (119/69 - 131/81)  RR: 18 (19 Oct 2022 07:30) (18 - 18)  SpO2: 98% (19 Oct 2022 07:30) (96% - 98%) room air    P/E:  elderly female, comfortable at rest , NAD  Psych: AAO x3  Neuro: No gross focal deficits; Power and sensation intact  CVS: S1S2 present, regular, no edema  Resp: BLAE+, No wheeze or Rhonchi  GI: Soft, BS+, Non tender, non distended  Extr: No  calf tenderness B/L Lower extremities  Right shoulder swelling, tenderness and limited ROM due to fracture  Skin: Warm and moist without any rashes    Labs:                      12.0   15.06 )-----------( 297      ( 19 Oct 2022 10:12 )             36.7   10-19  138  |  103  |  20<H>  ----------------------------<  144<H>  3.5   |  25  |  0.68    Ca    9.2      19 Oct 2022 10:12  Phos  2.6     10-19  Mg     2.1     10-19  TPro  7.3  /  Alb  3.2<L>  /  TBili  0.6  /  DBili  x   /  AST  20  /  ALT  17  /  AlkPhos  66  10-18    ACC: [id number] EXAM:  CT BRAIN                        ACC: [id number] EXAM:  CT CERVICAL SPINE                        PROCEDURE DATE:  10/19/2022      IMPRESSION:  1. No acute intracranial CT abnormality.  2. No evidence of acute cervical spine fracture or traumatic malalignment, within the limitations of motion artifact.  3. Hemorrhage in the right axillary region, secondary to displaced proximal humeral fracture demonstrated on the shoulder radiographs.    Xray Chest 1 View AP/PA (10.19.22 @ 04:48)    IMPRESSION: No acute infiltrate. Displaced angulated right humeral neck fracture with overlap.    D/D:  s/p Mechanical fall likely due to dehydration with volume depletion due to   COVID 19 infection plus age related physical debility likely  Right shoulder humeral neck fracture s/p fall  Type 2 DM  HTN controlled    Plan:  Admit to Medicine  Orthostatic BP and HR please   IV Fluids;  Orthopedic consult; d/w PA Ghanshyam; likely conservative mgmt  Pain control with Tylenol q 8 hrs x 2-3 days routine and Lidocaine patch; Use Tramadol PRN for breakthrough severe pain  Please continue Home Metoprolol dosing 12.5 mg once daily  PT eval; Feed with assistance  ISS for now; If sugars elevated may resume Metformin; I have d/w Patient that her GI symptoms last week for which she stopped Ozempic is likely related to underlying COVID she likely contracted last couple of weeks rather than SE of Ozempic which she tolerated well past few months.   DVT ppx with Lovenox    Discussed with patient at length and reviewed findings and plan of care at length as above; verbalized understanding and agree with the plan  Discussed with PGY1 HAYDEE Still

## 2022-10-19 NOTE — H&P ADULT - ASSESSMENT
82 year old female, from home, ambulates with cane, PMH HTN, HLD, DM, prior brain surgery (benign meningioma), prior cerebellar CVA, presents to the ED after fall at home. Found to be COVID + and X-Ray shows acute displaced angulated right humeral neck fracture. Admitted to medicine for syncope workup and management of humeral fracture.

## 2022-10-19 NOTE — H&P ADULT - PROBLEM SELECTOR PLAN 2
- CT showed hemorrhage in the right axillary region, secondary to displaced proximal humeral fracture demonstrated on the shoulder radiographs.  - X-ray right shoulder shows displaced humeral fx (wet read)  - pain control:  - Ortho consulted - CT showed hemorrhage in the right axillary region, secondary to displaced proximal humeral fracture demonstrated on the shoulder radiographs.  - X-ray right shoulder shows displaced humeral fx (wet read)  - pain control: Tylenol 625mg q8 standing, PRN tramadol 25mg for breakthrough pain  - Ortho consulted

## 2022-10-19 NOTE — H&P ADULT - PROBLEM SELECTOR PLAN 4
- hx of HTN on metoprolol succ 12.5mg qd  - start metoprolol tartrate 6.25mg BID with parameters  - monitor BP - hx of HTN on metoprolol succ 12.5mg qd  - start metoprolol tartrate 12.5mg with parameters  - monitor BP

## 2022-10-19 NOTE — CONSULT NOTE ADULT - SUBJECTIVE AND OBJECTIVE BOX
Pt Name: MARTELL GEE  MRN: [id number]    ORTHOPEDIC CONSULT:    Orthopedic diagnosis:    HPI: Patient is a 82y Female presenting with right shoulder pain s/p fall 2 days ago. Patient states she was at home when she tripped and fell onto right shoulder. Patient had immediate pain, generalized over right shoulder. Patient cannot recall if she lost consciousness. Denies any CP, SOB, paresthesias.       PAST MEDICAL & SURGICAL HISTORY:  Brain Tumor (Benign)      Nephrolithiasis      Headache  undiagnosed for years- exacerbation treated with Prednisone      Hyperlipidemia      Diabetes      Ischemic stroke      HTN (hypertension)      DM (diabetes mellitus)      HLD (hyperlipidemia)      Brain Tumor (Benign)      H/O brain surgery  benign          ALLERGIES: penicillin (Unknown)      MEDICATIONS: acetaminophen     Tablet .. 650 milliGRAM(s) Oral every 8 hours  aluminum hydroxide/magnesium hydroxide/simethicone Suspension 30 milliLiter(s) Oral every 4 hours PRN  atorvastatin 40 milliGRAM(s) Oral at bedtime  enoxaparin Injectable 40 milliGRAM(s) SubCutaneous every 24 hours  insulin lispro (ADMELOG) corrective regimen sliding scale   SubCutaneous three times a day before meals  insulin lispro (ADMELOG) corrective regimen sliding scale   SubCutaneous at bedtime  lidocaine   4% Patch 1 Patch Transdermal daily  melatonin 3 milliGRAM(s) Oral at bedtime PRN  metoprolol succinate ER 12.5 milliGRAM(s) Oral daily  ondansetron Injectable 4 milliGRAM(s) IV Push every 8 hours PRN  pantoprazole    Tablet 40 milliGRAM(s) Oral before breakfast  senna 2 Tablet(s) Oral at bedtime  sodium chloride 0.9%. 1000 milliLiter(s) IV Continuous <Continuous>  traMADol 25 milliGRAM(s) Oral every 6 hours PRN      PHYSICAL EXAM:    Vital Signs Last 24 Hrs  T(C): 36.6 (19 Oct 2022 11:37), Max: 37.3 (19 Oct 2022 00:16)  T(F): 97.9 (19 Oct 2022 11:37), Max: 99.1 (19 Oct 2022 00:16)  HR: 90 (19 Oct 2022 11:37) (90 - 101)  BP: 112/77 (19 Oct 2022 11:37) (112/77 - 131/81)  BP(mean): --  RR: 18 (19 Oct 2022 11:37) (18 - 18)  SpO2: 94% (19 Oct 2022 11:37) (94% - 98%)    Parameters below as of 19 Oct 2022 11:37  Patient On (Oxygen Delivery Method): room air        Right Shoulder/Upper Extremity: Skin intact. Ecchymosis noted over right shoulder and axillary area. Tenderness to palpation. Compartments at forearm soft and NT B/L. Sensation intact. ROM of fingers, wrists, and elbow B/L.       LABS:                        12.0   15.06 )-----------( 297      ( 19 Oct 2022 10:12 )             36.7     10-19    138  |  103  |  20<H>  ----------------------------<  144<H>  3.5   |  25  |  0.68    Ca    9.2      19 Oct 2022 10:12  Phos  2.6     10-19  Mg     2.1     10-19    TPro  7.3  /  Alb  3.2<L>  /  TBili  0.6  /  DBili  x   /  AST  20  /  ALT  17  /  AlkPhos  66  10-18        RADIOLOGY:   Xray Right Shoulder: Preliminary shows Right shoulder proximal humerus fracture surgical neck fracture      IMPRESSION: Pt is a  82y Female with Right Proximal Surgical Neck Humerus fracture    PLAN:  -  Pain management  -  DVT prophylaxis  -  Treat fracture conservatively at this time.   -  Sling applied to right upper extremity  -  Nonweight bearing of right upper extremity. ROM exercises of right hand  -  Case to be discussed with Dr. Korey Urbina

## 2022-10-19 NOTE — H&P ADULT - NSICDXPASTMEDICALHX_GEN_ALL_CORE_FT
PAST MEDICAL HISTORY:  DM (diabetes mellitus)     HLD (hyperlipidemia)     HTN (hypertension)      appt mailed

## 2022-10-19 NOTE — H&P ADULT - PROBLEM SELECTOR PLAN 1
-   -   -   - CT Head in 1/2020 showed subcentimeter hypodensities present within the right cerebellum, concerning for age indeterminant lacunar infarcts. - s/p unwitnessed fall, pt unsure if she lost consciousness.   - CTH and Cervical spine showed no acute intracranial pathology.  - CT Head from 1/2020 showed subcentimeter hypodensities present within the right cerebellum, concerning for age indeterminant lacunar infarcts.  - hx of cerbellar infarcts, residual ataxic gait  - , D-dimer 1258  - s/p 500cc IVF NS bolus in ED  -   - monitor on tele  - start gentle hydration IVF NS @75cc for 24 hours  - obtain orthostatic BP  - f/u TSH  - f/u PT eval - s/p unwitnessed fall, pt unsure if she lost consciousness.   - CTH and Cervical spine showed no acute intracranial pathology.  - CT Head from 1/2020 showed subcentimeter hypodensities present within the right cerebellum, concerning for age indeterminant lacunar infarcts.  - hx of cerbellar infarcts, residual ataxic gait  - , D-dimer 1258  - s/p 500cc IVF NS bolus in ED  -   - start gentle hydration IVF NS @75cc for 24 hours  - obtain orthostatic BP  - f/u TSH  - f/u PT eval

## 2022-10-19 NOTE — H&P ADULT - PROBLEM SELECTOR PLAN 6
- hx of DM2, on metformin 500mg BID & Ozempic 1mg SQ once weekly  - start ISS  - adjust regimen as needed  - monitor BG  - f/u Hba1c

## 2022-10-19 NOTE — H&P ADULT - PROBLEM SELECTOR PLAN 3
- found to be COVID-19 +  - states she has post-nasal drip otherwise feels asymptomatic  - saturating well on room air  - CXR shows no acute infiltrate  - Obtain ambulatory O2 and document  - Maintain O2 92-95%, O2 support titrate as needed.

## 2022-10-19 NOTE — H&P ADULT - CONVERSATION DETAILS
An extensive goals of care conversation was held including options, risks and benefits of many medical treatments including CPR, intubation, and tube feeding. Patient states that she would like all medical interventions done if her prognosis worsened. Patient would like to remain full code.

## 2022-10-19 NOTE — CONSULT NOTE ADULT - NS ATTEND AMEND GEN_ALL_CORE FT
82F RHD with right proximal humerus fx  +COVID    AOx3  +private home  Painful R shoulder ROM with ecchymosis  Deltoid intact  Remainder of extremities painless ROM w/o TTP or deformity  NVI  Tx options discussed along with R/B/A   Planned for conservative tx   Sling x 2 weeks, early ROM  NWB RUE  May benefit from IRIS  F/U in clinic 1 week from DC

## 2022-10-20 LAB
GLUCOSE BLDC GLUCOMTR-MCNC: 124 MG/DL — HIGH (ref 70–99)
GLUCOSE BLDC GLUCOMTR-MCNC: 132 MG/DL — HIGH (ref 70–99)
GLUCOSE BLDC GLUCOMTR-MCNC: 133 MG/DL — HIGH (ref 70–99)
GLUCOSE BLDC GLUCOMTR-MCNC: 135 MG/DL — HIGH (ref 70–99)

## 2022-10-20 PROCEDURE — 99233 SBSQ HOSP IP/OBS HIGH 50: CPT

## 2022-10-20 RX ORDER — SODIUM CHLORIDE 9 MG/ML
1000 INJECTION INTRAMUSCULAR; INTRAVENOUS; SUBCUTANEOUS
Refills: 0 | Status: DISCONTINUED | OUTPATIENT
Start: 2022-10-20 | End: 2022-10-27

## 2022-10-20 RX ORDER — ENOXAPARIN SODIUM 100 MG/ML
40 INJECTION SUBCUTANEOUS EVERY 12 HOURS
Refills: 0 | Status: DISCONTINUED | OUTPATIENT
Start: 2022-10-20 | End: 2022-10-28

## 2022-10-20 RX ADMIN — Medication 650 MILLIGRAM(S): at 07:09

## 2022-10-20 RX ADMIN — ENOXAPARIN SODIUM 40 MILLIGRAM(S): 100 INJECTION SUBCUTANEOUS at 11:31

## 2022-10-20 RX ADMIN — TRAMADOL HYDROCHLORIDE 25 MILLIGRAM(S): 50 TABLET ORAL at 16:28

## 2022-10-20 RX ADMIN — Medication 650 MILLIGRAM(S): at 22:45

## 2022-10-20 RX ADMIN — SENNA PLUS 2 TABLET(S): 8.6 TABLET ORAL at 21:45

## 2022-10-20 RX ADMIN — Medication 650 MILLIGRAM(S): at 21:45

## 2022-10-20 RX ADMIN — Medication 650 MILLIGRAM(S): at 05:50

## 2022-10-20 RX ADMIN — LIDOCAINE 1 PATCH: 4 CREAM TOPICAL at 23:40

## 2022-10-20 RX ADMIN — SODIUM CHLORIDE 75 MILLILITER(S): 9 INJECTION INTRAMUSCULAR; INTRAVENOUS; SUBCUTANEOUS at 20:24

## 2022-10-20 RX ADMIN — Medication 650 MILLIGRAM(S): at 16:27

## 2022-10-20 RX ADMIN — TRAMADOL HYDROCHLORIDE 25 MILLIGRAM(S): 50 TABLET ORAL at 17:42

## 2022-10-20 RX ADMIN — LIDOCAINE 1 PATCH: 4 CREAM TOPICAL at 19:00

## 2022-10-20 RX ADMIN — LIDOCAINE 1 PATCH: 4 CREAM TOPICAL at 11:30

## 2022-10-20 RX ADMIN — Medication 650 MILLIGRAM(S): at 17:40

## 2022-10-20 NOTE — PATIENT PROFILE ADULT - FALL HARM RISK - HARM RISK INTERVENTIONS

## 2022-10-20 NOTE — PROGRESS NOTE ADULT - SUBJECTIVE AND OBJECTIVE BOX
Patient is a 82y old  Female who presents with a chief complaint of fall, right humerus fracture (19 Oct 2022 16:42)    OVERNIGHT EVENTS: no acute events overnight, c/o intermittent right arm pain, siting up in bed eating breakfast       REVIEW OF SYSTEMS:  CONSTITUTIONAL: No fever, chills  RESPIRATORY: No cough, SOB  CARDIOVASCULAR: No chest pain, palpitations  GASTROINTESTINAL: No abdominal pain. No nausea, vomiting, or diarrhea  GENITOURINARY: No dysuria  NEUROLOGICAL: No HA  MUSCULOSKELETAL: +ve  intermittent right arm pain      T(C): 36.6 (10-20-22 @ 07:57), Max: 36.8 (10-19-22 @ 20:55)  HR: 86 (10-20-22 @ 07:57) (67 - 92)  BP: 117/59 (10-20-22 @ 07:57) (58/42 - 118/72)  RR: 18 (10-20-22 @ 07:57) (18 - 18)  SpO2: 94% (10-20-22 @ 07:57) (93% - 98%)  Wt(kg): --Vital Signs Last 24 Hrs  T(C): 36.6 (20 Oct 2022 07:57), Max: 36.8 (19 Oct 2022 20:55)  T(F): 97.9 (20 Oct 2022 07:57), Max: 98.2 (19 Oct 2022 20:55)  HR: 86 (20 Oct 2022 07:57) (67 - 92)  BP: 117/59 (20 Oct 2022 07:57) (58/42 - 118/72)  BP(mean): --  RR: 18 (20 Oct 2022 07:57) (18 - 18)  SpO2: 94% (20 Oct 2022 07:57) (93% - 98%)    Parameters below as of 20 Oct 2022 07:57  Patient On (Oxygen Delivery Method): room air    MEDICATIONS  (STANDING):  acetaminophen     Tablet .. 650 milliGRAM(s) Oral every 8 hours  atorvastatin 40 milliGRAM(s) Oral at bedtime  enoxaparin Injectable 40 milliGRAM(s) SubCutaneous every 24 hours  insulin lispro (ADMELOG) corrective regimen sliding scale   SubCutaneous three times a day before meals  insulin lispro (ADMELOG) corrective regimen sliding scale   SubCutaneous at bedtime  lidocaine   4% Patch 1 Patch Transdermal daily  metoprolol succinate ER 12.5 milliGRAM(s) Oral daily  pantoprazole    Tablet 40 milliGRAM(s) Oral before breakfast  senna 2 Tablet(s) Oral at bedtime  sodium chloride 0.9%. 1000 milliLiter(s) (75 mL/Hr) IV Continuous <Continuous>    MEDICATIONS  (PRN):  aluminum hydroxide/magnesium hydroxide/simethicone Suspension 30 milliLiter(s) Oral every 4 hours PRN Dyspepsia  melatonin 3 milliGRAM(s) Oral at bedtime PRN Insomnia  ondansetron Injectable 4 milliGRAM(s) IV Push every 8 hours PRN Nausea and/or Vomiting  traMADol 25 milliGRAM(s) Oral every 6 hours PRN Severe Pain (7 - 10)      PHYSICAL EXAM:  GENERAL: NAD  EYES: clear conjunctiva  ENMT: Moist mucous membranes  NECK: Supple, No JVD  CHEST/LUNG: Clear to auscultation bilaterally; No rales, rhonchi, wheezing, or rubs  HEART: S1, S2, Regular rate and rhythm  ABDOMEN: Soft, Nontender, Nondistended; Bowel sounds present  NEURO: Alert & Oriented X3  EXTREMITIES: No LE edema, no calf tenderness, limited ROM right arm, right arm in sling     Consultant(s) Notes Reviewed:  [x ] YES  [ ] NO  Care Discussed with Consultants/Other Providers [ x] YES  [ ] NO    LABS:                        12.0   15.06 )-----------( 297      ( 19 Oct 2022 10:12 )             36.7     10-19    138  |  103  |  20<H>  ----------------------------<  144<H>  3.5   |  25  |  0.68    Ca    9.2      19 Oct 2022 10:12  Phos  2.6     10-19  Mg     2.1     10-19    TPro  7.3  /  Alb  3.2<L>  /  TBili  0.6  /  DBili  x   /  AST  20  /  ALT  17  /  AlkPhos  66  10-18    CAPILLARY BLOOD GLUCOSE  POCT Blood Glucose.: 133 mg/dL (20 Oct 2022 07:39)  POCT Blood Glucose.: 136 mg/dL (19 Oct 2022 21:24)  POCT Blood Glucose.: 159 mg/dL (19 Oct 2022 17:04)    RADIOLOGY & ADDITIONAL TESTS:  < from: CT Cervical Spine No Cont (10.19.22 @ 06:30) >    ACC: [id number] EXAM:  CT BRAIN                        ACC: [id number] EXAM:  CT CERVICAL SPINE                          PROCEDURE DATE:  10/19/2022          INTERPRETATION:  EXAMINATION: CT CERVICAL SPINE, CT HEAD    DATE: 10/19/2022 6:30 AM    INDICATION: Trauma    COMPARISON: None available    TECHNIQUE: Thin section noncontrast axial images were obtained through   the head and cervical spine. Multiplanar reformats submitted.    FINDINGS:    CT HEAD: No acute intracranial hemorrhage or suspicious extra-axial fluid   collection. Left frontal encephalomalacia with overlying craniectomy. No   focal edema or acute mass effect. No hydrocephalus or central herniation.   Moderate cerebral atrophy and mild chronic microvascular ischemic change.    Minimal paranasal sinus mucosal disease. Minimal opacification of the   mastoids, more prominent on the left. Scalp and image midfacial soft   tissues are unremarkable.    CT cervical spine:  The imaged skull base and craniovertebral alignment are intact. Vertebral   body heights are maintained. No definite listhesis, although motion   artifact partially degrades evaluation. Within the limitations of motion,   no acute fracture.    Degenerative disc disease most prominent at C5-6 where there is narrowing   of the central canal to 7 mm and moderate right foraminal stenosis.    Imaged lung apices are clear. Hemorrhage in the right axillary region. No   prevertebral edema.    IMPRESSION:  1. No acute intracranial CT abnormality.  2. No evidence ofacute cervical spine fracture or traumatic   malalignment, within the limitations of motion artifact.  3. Hemorrhage in the right axillary region, secondary to displaced   proximal humeral fracture demonstrated on the shoulder radiographs.    < end of copied text >

## 2022-10-20 NOTE — PROGRESS NOTE ADULT - PROBLEM SELECTOR PLAN 1
-s/p fall at home   -CT showed displaced proximal humeral fracture  -Ortho following- conservative management recommended   -cont pain mgmt   -PT evaluation

## 2022-10-20 NOTE — PROGRESS NOTE ADULT - ASSESSMENT
82 year old female, from home, ambulates with cane, PMH HTN, HLD, DM, prior brain surgery (benign meningioma), prior cerebellar CVA, presents to the ED after fall at home. Found to be COVID + and X-Ray shows acute displaced angulated right humeral neck fracture. Admitted to medicine for syncope workup and management of humeral fracture. Ortho consulted and recommended conservative management

## 2022-10-21 DIAGNOSIS — M25.511 PAIN IN RIGHT SHOULDER: ICD-10-CM

## 2022-10-21 LAB
ALBUMIN SERPL ELPH-MCNC: 2.5 G/DL — LOW (ref 3.5–5)
ALP SERPL-CCNC: 56 U/L — SIGNIFICANT CHANGE UP (ref 40–120)
ALT FLD-CCNC: 19 U/L DA — SIGNIFICANT CHANGE UP (ref 10–60)
ANION GAP SERPL CALC-SCNC: 7 MMOL/L — SIGNIFICANT CHANGE UP (ref 5–17)
APTT BLD: 24.2 SEC — LOW (ref 27.5–35.5)
AST SERPL-CCNC: 24 U/L — SIGNIFICANT CHANGE UP (ref 10–40)
BILIRUB SERPL-MCNC: 0.6 MG/DL — SIGNIFICANT CHANGE UP (ref 0.2–1.2)
BUN SERPL-MCNC: 17 MG/DL — SIGNIFICANT CHANGE UP (ref 7–18)
CALCIUM SERPL-MCNC: 8.9 MG/DL — SIGNIFICANT CHANGE UP (ref 8.4–10.5)
CHLORIDE SERPL-SCNC: 107 MMOL/L — SIGNIFICANT CHANGE UP (ref 96–108)
CO2 SERPL-SCNC: 25 MMOL/L — SIGNIFICANT CHANGE UP (ref 22–31)
CREAT SERPL-MCNC: 0.48 MG/DL — LOW (ref 0.5–1.3)
D DIMER BLD IA.RAPID-MCNC: 753 NG/ML DDU — HIGH
EGFR: 94 ML/MIN/1.73M2 — SIGNIFICANT CHANGE UP
FERRITIN SERPL-MCNC: 359 NG/ML — HIGH (ref 15–150)
GLUCOSE BLDC GLUCOMTR-MCNC: 106 MG/DL — HIGH (ref 70–99)
GLUCOSE BLDC GLUCOMTR-MCNC: 114 MG/DL — HIGH (ref 70–99)
GLUCOSE BLDC GLUCOMTR-MCNC: 124 MG/DL — HIGH (ref 70–99)
GLUCOSE BLDC GLUCOMTR-MCNC: 157 MG/DL — HIGH (ref 70–99)
GLUCOSE SERPL-MCNC: 134 MG/DL — HIGH (ref 70–99)
HCT VFR BLD CALC: 32.5 % — LOW (ref 34.5–45)
HGB BLD-MCNC: 10.4 G/DL — LOW (ref 11.5–15.5)
INR BLD: 1.16 RATIO — SIGNIFICANT CHANGE UP (ref 0.88–1.16)
LDH SERPL L TO P-CCNC: 181 U/L — SIGNIFICANT CHANGE UP (ref 120–225)
MCHC RBC-ENTMCNC: 29.9 PG — SIGNIFICANT CHANGE UP (ref 27–34)
MCHC RBC-ENTMCNC: 32 GM/DL — SIGNIFICANT CHANGE UP (ref 32–36)
MCV RBC AUTO: 93.4 FL — SIGNIFICANT CHANGE UP (ref 80–100)
NRBC # BLD: 0 /100 WBCS — SIGNIFICANT CHANGE UP (ref 0–0)
PLATELET # BLD AUTO: 294 K/UL — SIGNIFICANT CHANGE UP (ref 150–400)
POTASSIUM SERPL-MCNC: 3.7 MMOL/L — SIGNIFICANT CHANGE UP (ref 3.5–5.3)
POTASSIUM SERPL-SCNC: 3.7 MMOL/L — SIGNIFICANT CHANGE UP (ref 3.5–5.3)
PROT SERPL-MCNC: 6.3 G/DL — SIGNIFICANT CHANGE UP (ref 6–8.3)
PROTHROM AB SERPL-ACNC: 13.8 SEC — HIGH (ref 10.5–13.4)
RBC # BLD: 3.48 M/UL — LOW (ref 3.8–5.2)
RBC # FLD: 14 % — SIGNIFICANT CHANGE UP (ref 10.3–14.5)
SODIUM SERPL-SCNC: 139 MMOL/L — SIGNIFICANT CHANGE UP (ref 135–145)
T3FREE SERPL-MCNC: 2.6 PG/ML — SIGNIFICANT CHANGE UP (ref 2–4.4)
T4 AB SER-ACNC: 9.8 UG/DL — SIGNIFICANT CHANGE UP (ref 4.6–12)
WBC # BLD: 11.06 K/UL — HIGH (ref 3.8–10.5)
WBC # FLD AUTO: 11.06 K/UL — HIGH (ref 3.8–10.5)

## 2022-10-21 PROCEDURE — 99222 1ST HOSP IP/OBS MODERATE 55: CPT

## 2022-10-21 PROCEDURE — 99233 SBSQ HOSP IP/OBS HIGH 50: CPT

## 2022-10-21 RX ORDER — MORPHINE SULFATE 50 MG/1
2 CAPSULE, EXTENDED RELEASE ORAL EVERY 6 HOURS
Refills: 0 | Status: DISCONTINUED | OUTPATIENT
Start: 2022-10-21 | End: 2022-10-24

## 2022-10-21 RX ORDER — TRAMADOL HYDROCHLORIDE 50 MG/1
50 TABLET ORAL EVERY 6 HOURS
Refills: 0 | Status: DISCONTINUED | OUTPATIENT
Start: 2022-10-21 | End: 2022-10-21

## 2022-10-21 RX ORDER — KETOROLAC TROMETHAMINE 30 MG/ML
15 SYRINGE (ML) INJECTION EVERY 6 HOURS
Refills: 0 | Status: DISCONTINUED | OUTPATIENT
Start: 2022-10-21 | End: 2022-10-23

## 2022-10-21 RX ORDER — TRAMADOL HYDROCHLORIDE 50 MG/1
50 TABLET ORAL EVERY 6 HOURS
Refills: 0 | Status: DISCONTINUED | OUTPATIENT
Start: 2022-10-21 | End: 2022-10-24

## 2022-10-21 RX ORDER — MAGNESIUM HYDROXIDE 400 MG/1
30 TABLET, CHEWABLE ORAL DAILY
Refills: 0 | Status: DISCONTINUED | OUTPATIENT
Start: 2022-10-21 | End: 2022-10-31

## 2022-10-21 RX ORDER — ACETAMINOPHEN 500 MG
1000 TABLET ORAL EVERY 8 HOURS
Refills: 0 | Status: DISCONTINUED | OUTPATIENT
Start: 2022-10-21 | End: 2022-10-24

## 2022-10-21 RX ADMIN — Medication 2: at 08:00

## 2022-10-21 RX ADMIN — MORPHINE SULFATE 2 MILLIGRAM(S): 50 CAPSULE, EXTENDED RELEASE ORAL at 15:24

## 2022-10-21 RX ADMIN — LIDOCAINE 1 PATCH: 4 CREAM TOPICAL at 07:59

## 2022-10-21 RX ADMIN — LIDOCAINE 1 PATCH: 4 CREAM TOPICAL at 20:41

## 2022-10-21 RX ADMIN — TRAMADOL HYDROCHLORIDE 25 MILLIGRAM(S): 50 TABLET ORAL at 12:22

## 2022-10-21 RX ADMIN — Medication 650 MILLIGRAM(S): at 12:23

## 2022-10-21 RX ADMIN — TRAMADOL HYDROCHLORIDE 25 MILLIGRAM(S): 50 TABLET ORAL at 15:09

## 2022-10-21 RX ADMIN — Medication 15 MILLIGRAM(S): at 18:46

## 2022-10-21 RX ADMIN — MORPHINE SULFATE 2 MILLIGRAM(S): 50 CAPSULE, EXTENDED RELEASE ORAL at 16:25

## 2022-10-21 RX ADMIN — TRAMADOL HYDROCHLORIDE 25 MILLIGRAM(S): 50 TABLET ORAL at 09:29

## 2022-10-21 RX ADMIN — TRAMADOL HYDROCHLORIDE 50 MILLIGRAM(S): 50 TABLET ORAL at 19:58

## 2022-10-21 RX ADMIN — Medication 1000 MILLIGRAM(S): at 19:57

## 2022-10-21 RX ADMIN — TRAMADOL HYDROCHLORIDE 25 MILLIGRAM(S): 50 TABLET ORAL at 07:54

## 2022-10-21 RX ADMIN — Medication 650 MILLIGRAM(S): at 06:16

## 2022-10-21 RX ADMIN — Medication 650 MILLIGRAM(S): at 14:20

## 2022-10-21 RX ADMIN — ENOXAPARIN SODIUM 40 MILLIGRAM(S): 100 INJECTION SUBCUTANEOUS at 15:24

## 2022-10-21 RX ADMIN — LIDOCAINE 1 PATCH: 4 CREAM TOPICAL at 19:57

## 2022-10-21 RX ADMIN — TRAMADOL HYDROCHLORIDE 50 MILLIGRAM(S): 50 TABLET ORAL at 18:45

## 2022-10-21 RX ADMIN — Medication 15 MILLIGRAM(S): at 19:57

## 2022-10-21 RX ADMIN — Medication 650 MILLIGRAM(S): at 08:21

## 2022-10-21 RX ADMIN — Medication 1000 MILLIGRAM(S): at 18:46

## 2022-10-21 RX ADMIN — ONDANSETRON 4 MILLIGRAM(S): 8 TABLET, FILM COATED ORAL at 15:24

## 2022-10-21 NOTE — PROGRESS NOTE ADULT - NS ATTEND AMEND GEN_ALL_CORE FT
Patient seen and examined this afternoon    82 year old female, from home, ambulates with cane, PMH HTN, HLD, DM, prior brain surgery (benign meningioma), prior cerebellar CVA, presents to the ED after fall at home. Pt states that 2 nights ago, she was walking to her bedroom it was dark and she fell. Pt was not able to get up on her own. She states she felt vertigo, "the room spinning" after she fell and is not sure if she lost consciousness or not. She states she has 6/10 pain in her right shoulder. Her neighbor called EMS the next day when she checked in on her. Pt found to be COVID+ in the ED. Also with Right shoulder fracture.     As per patient she was away to [facility] in August to September after Son passed away. She just returned at the end of September. She is visited by her grandkids usually. Marcia is NOK. She reported also stop taking Ozempic last week after she had Nausea and stomach upset but has been tolerating well for last few months.    Patient doing much better today; appear more hydrated; NAD; not needing supplemental oxygen. No fever. No urinary symptoms.   denies SOB, palpitations, chest pain, nausea, vomiting, diarrhea, constipation, dizziness    Vital Signs Last 24 Hrs  T(C): 36.3 (21 Oct 2022 13:37), Max: 36.7 (20 Oct 2022 18:04)  T(F): 97.3 (21 Oct 2022 13:37), Max: 98.1 (20 Oct 2022 18:04)  HR: 73 (21 Oct 2022 13:37) (73 - 90)  BP: 136/55 (21 Oct 2022 13:37) (121/42 - 136/55)  RR: 17 (21 Oct 2022 13:37) (17 - 17)  SpO2: 95% (21 Oct 2022 13:37) (95% - 96%) room air      P/E:  elderly female, comfortable at rest , NAD  Psych: AAO x3  Neuro: No gross focal deficits; Power and sensation intact  CVS: S1S2 present, regular, no edema  Resp: BLAE+, No wheeze or Rhonchi  GI: Soft, BS+, Non tender, non distended  Extr: No  calf tenderness B/L Lower extremities  Right shoulder swelling, tenderness and limited ROM due to fracture; right arm sling in place  Skin: Warm and moist without any rashes    Labs:                           10.4   11.06 )-----------( 294      ( 21 Oct 2022 06:24 )             32.5   10-21    139  |  107  |  17  ----------------------------<  134<H>  3.7   |  25  |  0.48<L>    Ca    8.9      21 Oct 2022 06:24    TPro  6.3  /  Alb  2.5<L>  /  TBili  0.6  /  DBili  x   /  AST  24  /  ALT  19  /  AlkPhos  56  10-21    T4, Serum (10.21.22 @ 06:24) T4, Serum: 9.8 ug/dL   Ferritin, Serum in AM (10.21.22 @ 06:24) Ferritin, Serum: 359 ng/mL   Lactate Dehydrogenase, Serum in AM (10.21.22 @ 06:24) Lactate Dehydrogenase, Serum: 181 U/L   D-Dimer Assay, Quantitative (10.21.22 @ 06:24) D-Dimer Assay, Quantitative: 753:   D-Dimer Assay, Quantitative (10.19.22 @ 07:16) D-Dimer Assay, Quantitative: 1258    ACC: [id number] EXAM:  CT BRAIN                        ACC: [id number] EXAM:  CT CERVICAL SPINE                        PROCEDURE DATE:  10/19/2022    IMPRESSION:  1. No acute intracranial CT abnormality.  2. No evidence of acute cervical spine fracture or traumatic malalignment, within the limitations of motion artifact.  3. Hemorrhage in the right axillary region, secondary to displaced proximal humeral fracture demonstrated on the shoulder radiographs.    Xray Chest 1 View AP/PA (10.19.22 @ 04:48)  IMPRESSION: No acute infiltrate. Displaced angulated right humeral neck fracture with overlap.    D/D:  s/p Mechanical fall likely due to dehydration with volume depletion due to   COVID 19 infection plus age related physical debility likely  Right shoulder humeral neck fracture s/p fall conservative mgmt as per Ortho  Elevated TSH likely Sick Euthyroid state  Type 2 DM controlled  HTN controlled    Plan:   Continue IV Fluids x 24 hrs more; eating better  Orthopedic consult appreciated;  conservative mgmt; right arm sling in place  Pain control with Tylenol q 8 hrs x 2-3 days routine and Lidocaine patch; Use Tramadol PRN for breakthrough severe pain  Continuer Metoprolol ER 12.5 mg daily  PT eval; Feed with assistance  Discussed with patient potential need for IRIS as she lives alone; Patient in agreement if indicated; await PT   ISS for now; I have d/w Patient that her GI symptoms last week for which she stopped Ozempic is likely related to underlying COVID she likely contracted last couple of weeks rather than SE of Ozempic which she tolerated well past few months.   Will resume Ozempic as outpatient; May resume Metformin AM if sugars elevated persistently above 150; still 120 to 150 range.   DVT ppx with Lovenox will increase to BID as D-dimer was elevated >1000  Repeat inflammatory markers in AM (ordered)  Follow up TSH as outpatient; If still inhouse will repeat in couple of days    Discussed with patient at length and reviewed findings and plan of care at length as above; verbalized understanding and agree with the plan; D/C Plan 1-2 days based on PT recs.  Discussed with ACP Obdulio

## 2022-10-21 NOTE — CONSULT NOTE ADULT - SUBJECTIVE AND OBJECTIVE BOX
Source of information: MARTELL GEE, Chart review  Patient language: English  : n/a    HPI:  82 year old female, from home, ambulates with cane, PMH HTN, HLD, DM, prior brain surgery (benign meningioma), prior cerebellar CVA, presents to the ED after fall at home. Pt states that 2 nights ago, she was walking to her bedroom and felt as if the distance was further. She states it was dark and she fell. Pt was not able to get up on her own. She states she felt vertigo, "the room spinning" after she fell and is not sure if she lost consciousness or not. She states she has 6/10 pain in her right shoulder. Her neighbor called EMS the next day when she checked in on her. Pt states that she recently traveled back from [facility] a couple weeks ago. She also recently stopped taking Ozempic 1 week ago due to symptoms of nausea and diarrhea which she attributed to the medication. Pt found to be COVID+ in the ED. She states she has post-nasal drip and minor chills but otherwise denies headache, fever, cough, shortness of breath, sore throat, chest pain, palpitations, abdominal pain, diarrhea, constipation or dysuria.    In ED: vitals /69, HR 96, Temp 98.1F, 98% on RA  CT Head and Cervical Spine shows no acute intracranial CT abnormality. No evidence of acute cervical spine fracture or traumatic malalignment, within the limitations of motion artifact. Hemorrhage in the right axillary region, secondary to displaced proximal humeral fracture demonstrated on the shoulder radiographs.  s/p 500cc IVF NS bolus in ED (19 Oct 2022 09:24)      Patient is a 82y old  Female who presents with a chief complaint of fall, right humerus fracture (21 Oct 2022 12:40)  . Pt is admitted for ..., being treated with .... Pain consulted for .... Pt seen and examined at bedside. Reports pain score ***  SCALE USED: (1-10 VNRS). Pt describes pain as .... radiating to... alleviated by pain medication... exacerbated by movement... Pt tolerating PO diet. Denies lethargy, nausea, vomiting, constipation, itchiness. Reports last BM ***. Patient stated goal for pain control: to be able to take deep breaths, get out of bed to chair and ambulate with tolerable pain control. Pt denies taking medications for pain at home.     PAST MEDICAL & SURGICAL HISTORY:  Brain Tumor (Benign)      Nephrolithiasis      Headache  undiagnosed for years- exacerbation treated with Prednisone      Hyperlipidemia      Diabetes      Ischemic stroke      HTN (hypertension)      DM (diabetes mellitus)      HLD (hyperlipidemia)      Brain Tumor (Benign)      H/O brain surgery  benign          FAMILY HISTORY:  Family history of cardiomyopathy    FH: heart attack (Father)    FH: heart disease (Child)        Social History:  Pt lives at home alone, no HHA. Denies tobacco, alcohol or illicit drug use. (19 Oct 2022 09:24)   [ ] Denies ETOH use, illicit drug use and smoking    Allergies    penicillin (Unknown)    Intolerances        MEDICATIONS  (STANDING):  acetaminophen     Tablet .. 650 milliGRAM(s) Oral every 8 hours  atorvastatin 40 milliGRAM(s) Oral at bedtime  enoxaparin Injectable 40 milliGRAM(s) SubCutaneous every 12 hours  insulin lispro (ADMELOG) corrective regimen sliding scale   SubCutaneous three times a day before meals  insulin lispro (ADMELOG) corrective regimen sliding scale   SubCutaneous at bedtime  lidocaine   4% Patch 1 Patch Transdermal daily  metoprolol succinate ER 12.5 milliGRAM(s) Oral daily  pantoprazole    Tablet 40 milliGRAM(s) Oral before breakfast  senna 2 Tablet(s) Oral at bedtime  sodium chloride 0.9%. 1000 milliLiter(s) (75 mL/Hr) IV Continuous <Continuous>    MEDICATIONS  (PRN):  aluminum hydroxide/magnesium hydroxide/simethicone Suspension 30 milliLiter(s) Oral every 4 hours PRN Dyspepsia  melatonin 3 milliGRAM(s) Oral at bedtime PRN Insomnia  morphine  - Injectable 2 milliGRAM(s) IV Push every 6 hours PRN Severe Pain (7 - 10)  ondansetron Injectable 4 milliGRAM(s) IV Push every 8 hours PRN Nausea and/or Vomiting  traMADol 50 milliGRAM(s) Oral every 6 hours PRN Moderate Pain (4 - 6)      Vital Signs Last 24 Hrs  T(C): 36.3 (21 Oct 2022 13:37), Max: 36.7 (20 Oct 2022 15:25)  T(F): 97.3 (21 Oct 2022 13:37), Max: 98.1 (20 Oct 2022 15:25)  HR: 73 (21 Oct 2022 13:37) (73 - 90)  BP: 136/55 (21 Oct 2022 13:37) (101/51 - 136/55)  BP(mean): --  RR: 17 (21 Oct 2022 13:37) (17 - 17)  SpO2: 95% (21 Oct 2022 13:37) (95% - 96%)    Parameters below as of 21 Oct 2022 13:37  Patient On (Oxygen Delivery Method): room air        LABS: Reviewed.                          10.4   11.06 )-----------( 294      ( 21 Oct 2022 06:24 )             32.5     10-21    139  |  107  |  17  ----------------------------<  134<H>  3.7   |  25  |  0.48<L>    Ca    8.9      21 Oct 2022 06:24    TPro  6.3  /  Alb  2.5<L>  /  TBili  0.6  /  DBili  x   /  AST  24  /  ALT  19  /  AlkPhos  56  10-21    PT/INR - ( 21 Oct 2022 06:24 )   PT: 13.8 sec;   INR: 1.16 ratio         PTT - ( 21 Oct 2022 06:24 )  PTT:24.2 sec  LIVER FUNCTIONS - ( 21 Oct 2022 06:24 )  Alb: 2.5 g/dL / Pro: 6.3 g/dL / ALK PHOS: 56 U/L / ALT: 19 U/L DA / AST: 24 U/L / GGT: x             CAPILLARY BLOOD GLUCOSE      POCT Blood Glucose.: 114 mg/dL (21 Oct 2022 11:32)  POCT Blood Glucose.: 157 mg/dL (21 Oct 2022 07:40)  POCT Blood Glucose.: 135 mg/dL (20 Oct 2022 21:50)  POCT Blood Glucose.: 124 mg/dL (20 Oct 2022 17:52)    COVID-19 PCR: Detected (19 Oct 2022 00:13)      Radiology: Reviewed.     ORT Score -   Family Hx of substance abuse	Female	      Male  Alcohol 	                                           1                     3  Illegal drugs	                                   2                     3  Rx drugs                                           4 	                  4  Personal Hx of substance abuse		  Alcohol 	                                          3	                  3  Illegal drugs                                     4	                  4  Rx drugs                                            5 	                  5  Age between 16- 45 years	           1                     1  hx preadolescent sexual abuse	   3 	                  0  Psychological disease		  ADD, OCD, bipolar, schizophrenia   2	          2  Depression                                           1 	          1  Total: 0    a score of 3 or lower indicates low risk for opioid abuse		  a score of 4-7 indicates moderate risk for opioid abuse		  a score of 8 or higher indicates high risk for opioid abuse  	  4AT (Assessment test for delirium & cognitive impairment)  _________________________________________________________  [1] ALERTNESS  This includes patients who may be markedly drowsy (eg. difficult to rouse and/or obviously sleepy  during assessment) or agitated/hyperactive. Observe the patient. If asleep, attempt to wake with  speech or gentle touch on shoulder. Ask the patient to state their name and address to assist rating.  Normal (fully alert, but not agitated, throughout assessment) 0  Mild sleepiness for <10 seconds after waking, then normal 0  Clearly abnormal 4    [2] AMT4  Age, date of birth, place (name of the hospital or building), current year.  No mistakes 0  1 mistake 1  2 or more mistakes/untestable 2    [3] ATTENTION  Ask the patient: “Please tell me the months of the year in backwards order, starting at December.”  To assist initial understanding one prompt of “what is the month before December?” is permitted.  Months of the year backwards Achieves 7 months or more correctly 0  Starts but scores <7 months / refuses to start 1   Untestable (cannot start because unwell, drowsy, inattentive) 2    [4] ACUTE CHANGE OR FLUCTUATING COURSE  Evidence of significant change or fluctuation in: alertness, cognition, other mental function  (eg. paranoia, hallucinations) arising over the last 2 weeks and still evident in last 24hrs  No 0  Yes 4    4 or above: possible delirium +/- cognitive impairment  1-3: possible cognitive impairment  0: delirium or severe cognitive impairment unlikely (but delirium still possible if [4] information incomplete)    4AT SCORE: 0    REVIEW OF SYSTEMS:  CONSTITUTIONAL: No fever or fatigue  HEENT:  No difficulty hearing, no change in vision  NECK: No pain or stiffness  RESPIRATORY: No cough, wheezing, chills or hemoptysis; No shortness of breath  CARDIOVASCULAR: No chest pain, palpitations, dizziness, or leg swelling  GASTROINTESTINAL: No loss of appetite, decreased PO intake. No abdominal or epigastric pain. No nausea, vomiting; No diarrhea or constipation.   GENITOURINARY: No dysuria, frequency, hematuria, retention or incontinence  MUSCULOSKELETAL: No joint pain or swelling; No muscle, back, or extremity pain, no upper or lower motor strength weakness, no saddle anesthesia, bowel/bladder incontinence, no falls   NEURO: No headaches, No numbness/tingling b/l LE, No weakness  ENDOCRINE: No polyuria, polydipsia, heat or cold intolerance; No hair loss  PSYCHIATRIC: No depression, anxiety or difficulty sleeping    PHYSICAL EXAM:  GENERAL:  Alert & Oriented X4, cooperative, NAD, Good concentration. Speech is clear.   RESPIRATORY: Respirations even and unlabored. Clear to auscultation bilaterally; No rales, rhonchi, wheezing, or rubs  CARDIOVASCULAR: Normal S1/S2, regular rate and rhythm; No murmurs, rubs, or gallops. No JVD.   GASTROINTESTINAL:  Soft, Nontender, Nondistended; Bowel sounds present  PERIPHERAL VASCULAR:  Extremities warm without edema. 2+ Peripheral Pulses, No cyanosis, No calf tenderness  MUSCULOSKELETAL: Motor Strength 5/5 B/L upper and lower extremities; moves all extremities equally against gravity; ROM intact; negative SLR; No tenderness on palpation of all joints.   SKIN: Warm, dry, intact. No rashes, lesions, scars or wounds.     Risk factors associated with adverse outcomes related to opioid treatment  [ ]  Concurrent benzodiazepine use  [ ]  History/ Active substance use or alcohol use disorder  [ ] Psychiatric co-morbidity  [ ] Sleep apnea  [ ] COPD  [ ] BMI> 35  [ ] Liver dysfunction  [ ] Renal dysfunction  [ ] CHF  [ ] Smoker  [ ]  Age > 60 years    [ ]  [facility]  Reviewed and Copied to Chart. See below.    Plan of care and goal oriented pain management treatment options were discussed with patient and /or primary care giver; all questions and concerns were addressed and care was aligned with patient's wishes.    Educated patient on goal oriented pain management treatment options        Source of information: MARTELL GEE, Chart review  Patient language: English  : n/a    HPI:  82 year old female, from home, ambulates with cane, PMH HTN, HLD, DM, prior brain surgery (benign meningioma), prior cerebellar CVA, presents to the ED after fall at home. Pt states that 2 nights ago, she was walking to her bedroom and felt as if the distance was further. She states it was dark and she fell. Pt was not able to get up on her own. She states she felt vertigo, "the room spinning" after she fell and is not sure if she lost consciousness or not. She states she has 6/10 pain in her right shoulder. Her neighbor called EMS the next day when she checked in on her. Pt states that she recently traveled back from [facility] a couple weeks ago. She also recently stopped taking Ozempic 1 week ago due to symptoms of nausea and diarrhea which she attributed to the medication. Pt found to be COVID+ in the ED. She states she has post-nasal drip and minor chills but otherwise denies headache, fever, cough, shortness of breath, sore throat, chest pain, palpitations, abdominal pain, diarrhea, constipation or dysuria.    In ED: vitals /69, HR 96, Temp 98.1F, 98% on RA  CT Head and Cervical Spine shows no acute intracranial CT abnormality. No evidence of acute cervical spine fracture or traumatic malalignment, within the limitations of motion artifact. Hemorrhage in the right axillary region, secondary to displaced proximal humeral fracture demonstrated on the shoulder radiographs.  s/p 500cc IVF NS bolus in ED (19 Oct 2022 09:24)      Pt is admitted for s/p Fall. Pain consulted for Rt arm/shoulder pain. Pt with displaced proximal humeral fracture per CT. Pt seen and examined at bedside. Patient found sitting in bed, with PT at bedside, able to get out of bed with assistance using walker, able to stand up and walk side to side a few steps, no dizziness. Reports pain score ***  SCALE USED: (1-10 VNRS). Pt describes pain as .... radiating to... alleviated by pain medication... exacerbated by movement... Pt tolerating PO diet. Denies lethargy, nausea, vomiting, constipation, itchiness. Reports last BM ***. Patient stated goal for pain control: to be able to take deep breaths, get out of bed to chair and ambulate with tolerable pain control. Pt denies taking medications for pain at home.     PAST MEDICAL & SURGICAL HISTORY:  Brain Tumor (Benign)      Nephrolithiasis      Headache  undiagnosed for years- exacerbation treated with Prednisone      Hyperlipidemia      Diabetes      Ischemic stroke      HTN (hypertension)      DM (diabetes mellitus)      HLD (hyperlipidemia)      Brain Tumor (Benign)      H/O brain surgery  benign          FAMILY HISTORY:  Family history of cardiomyopathy    FH: heart attack (Father)    FH: heart disease (Child)        Social History:  Pt lives at home alone, no HHA. Denies tobacco, alcohol or illicit drug use. (19 Oct 2022 09:24)   [ ] Denies ETOH use, illicit drug use and smoking    Allergies    penicillin (Unknown)    Intolerances        MEDICATIONS  (STANDING):  acetaminophen     Tablet .. 650 milliGRAM(s) Oral every 8 hours  atorvastatin 40 milliGRAM(s) Oral at bedtime  enoxaparin Injectable 40 milliGRAM(s) SubCutaneous every 12 hours  insulin lispro (ADMELOG) corrective regimen sliding scale   SubCutaneous three times a day before meals  insulin lispro (ADMELOG) corrective regimen sliding scale   SubCutaneous at bedtime  lidocaine   4% Patch 1 Patch Transdermal daily  metoprolol succinate ER 12.5 milliGRAM(s) Oral daily  pantoprazole    Tablet 40 milliGRAM(s) Oral before breakfast  senna 2 Tablet(s) Oral at bedtime  sodium chloride 0.9%. 1000 milliLiter(s) (75 mL/Hr) IV Continuous <Continuous>    MEDICATIONS  (PRN):  aluminum hydroxide/magnesium hydroxide/simethicone Suspension 30 milliLiter(s) Oral every 4 hours PRN Dyspepsia  melatonin 3 milliGRAM(s) Oral at bedtime PRN Insomnia  morphine  - Injectable 2 milliGRAM(s) IV Push every 6 hours PRN Severe Pain (7 - 10)  ondansetron Injectable 4 milliGRAM(s) IV Push every 8 hours PRN Nausea and/or Vomiting  traMADol 50 milliGRAM(s) Oral every 6 hours PRN Moderate Pain (4 - 6)      Vital Signs Last 24 Hrs  T(C): 36.3 (21 Oct 2022 13:37), Max: 36.7 (20 Oct 2022 15:25)  T(F): 97.3 (21 Oct 2022 13:37), Max: 98.1 (20 Oct 2022 15:25)  HR: 73 (21 Oct 2022 13:37) (73 - 90)  BP: 136/55 (21 Oct 2022 13:37) (101/51 - 136/55)  BP(mean): --  RR: 17 (21 Oct 2022 13:37) (17 - 17)  SpO2: 95% (21 Oct 2022 13:37) (95% - 96%)    Parameters below as of 21 Oct 2022 13:37  Patient On (Oxygen Delivery Method): room air        LABS: Reviewed.                          10.4 11.06 )-----------( 294      ( 21 Oct 2022 06:24 )             32.5     10-21    139  |  107  |  17  ----------------------------<  134<H>  3.7   |  25  |  0.48<L>    Ca    8.9      21 Oct 2022 06:24    TPro  6.3  /  Alb  2.5<L>  /  TBili  0.6  /  DBili  x   /  AST  24  /  ALT  19  /  AlkPhos  56  10-21    PT/INR - ( 21 Oct 2022 06:24 )   PT: 13.8 sec;   INR: 1.16 ratio         PTT - ( 21 Oct 2022 06:24 )  PTT:24.2 sec  LIVER FUNCTIONS - ( 21 Oct 2022 06:24 )  Alb: 2.5 g/dL / Pro: 6.3 g/dL / ALK PHOS: 56 U/L / ALT: 19 U/L DA / AST: 24 U/L / GGT: x             CAPILLARY BLOOD GLUCOSE      POCT Blood Glucose.: 114 mg/dL (21 Oct 2022 11:32)  POCT Blood Glucose.: 157 mg/dL (21 Oct 2022 07:40)  POCT Blood Glucose.: 135 mg/dL (20 Oct 2022 21:50)  POCT Blood Glucose.: 124 mg/dL (20 Oct 2022 17:52)    COVID-19 PCR: Detected (19 Oct 2022 00:13)      Radiology: Reviewed.     ORT Score -   Family Hx of substance abuse	Female	      Male  Alcohol 	                                           1                     3  Illegal drugs	                                   2                     3  Rx drugs                                           4 	                  4  Personal Hx of substance abuse		  Alcohol 	                                          3	                  3  Illegal drugs                                     4	                  4  Rx drugs                                            5 	                  5  Age between 16- 45 years	           1                     1  hx preadolescent sexual abuse	   3 	                  0  Psychological disease		  ADD, OCD, bipolar, schizophrenia   2	          2  Depression                                           1 	          1  Total: 0    a score of 3 or lower indicates low risk for opioid abuse		  a score of 4-7 indicates moderate risk for opioid abuse		  a score of 8 or higher indicates high risk for opioid abuse  	  4AT (Assessment test for delirium & cognitive impairment)  _________________________________________________________  [1] ALERTNESS  This includes patients who may be markedly drowsy (eg. difficult to rouse and/or obviously sleepy  during assessment) or agitated/hyperactive. Observe the patient. If asleep, attempt to wake with  speech or gentle touch on shoulder. Ask the patient to state their name and address to assist rating.  Normal (fully alert, but not agitated, throughout assessment) 0  Mild sleepiness for <10 seconds after waking, then normal 0  Clearly abnormal 4    [2] AMT4  Age, date of birth, place (name of the hospital or building), current year.  No mistakes 0  1 mistake 1  2 or more mistakes/untestable 2    [3] ATTENTION  Ask the patient: “Please tell me the months of the year in backwards order, starting at December.”  To assist initial understanding one prompt of “what is the month before December?” is permitted.  Months of the year backwards Achieves 7 months or more correctly 0  Starts but scores <7 months / refuses to start 1   Untestable (cannot start because unwell, drowsy, inattentive) 2    [4] ACUTE CHANGE OR FLUCTUATING COURSE  Evidence of significant change or fluctuation in: alertness, cognition, other mental function  (eg. paranoia, hallucinations) arising over the last 2 weeks and still evident in last 24hrs  No 0  Yes 4    4 or above: possible delirium +/- cognitive impairment  1-3: possible cognitive impairment  0: delirium or severe cognitive impairment unlikely (but delirium still possible if [4] information incomplete)    4AT SCORE: 0    REVIEW OF SYSTEMS:  CONSTITUTIONAL: No fever or fatigue  HEENT:  No difficulty hearing, no change in vision  NECK: No pain or stiffness  RESPIRATORY: No cough, wheezing, chills or hemoptysis; No shortness of breath  CARDIOVASCULAR: No chest pain, palpitations, dizziness, or leg swelling  GASTROINTESTINAL: No loss of appetite, decreased PO intake. No abdominal or epigastric pain. No nausea, vomiting; No diarrhea or constipation.   GENITOURINARY: No dysuria, frequency, hematuria, retention or incontinence  MUSCULOSKELETAL: No joint pain or swelling; No muscle, back, or extremity pain, no upper or lower motor strength weakness, no saddle anesthesia, bowel/bladder incontinence, no falls   NEURO: No headaches, No numbness/tingling b/l LE, No weakness  ENDOCRINE: No polyuria, polydipsia, heat or cold intolerance; No hair loss  PSYCHIATRIC: No depression, anxiety or difficulty sleeping    PHYSICAL EXAM:  GENERAL:  Alert & Oriented X4, cooperative, NAD, Good concentration. Speech is clear.   RESPIRATORY: Respirations even and unlabored. Clear to auscultation bilaterally; No rales, rhonchi, wheezing, or rubs  CARDIOVASCULAR: Normal S1/S2, regular rate and rhythm; No murmurs, rubs, or gallops. No JVD.   GASTROINTESTINAL:  Soft, Nontender, Nondistended; Bowel sounds present  PERIPHERAL VASCULAR:  Extremities warm without edema. 2+ Peripheral Pulses, No cyanosis, No calf tenderness  MUSCULOSKELETAL: Motor Strength 5/5 B/L upper and lower extremities; moves all extremities equally against gravity; ROM intact; negative SLR; No tenderness on palpation of all joints.   SKIN: Warm, dry, intact. No rashes, lesions, scars or wounds.     Risk factors associated with adverse outcomes related to opioid treatment  [ ]  Concurrent benzodiazepine use  [ ]  History/ Active substance use or alcohol use disorder  [ ] Psychiatric co-morbidity  [ ] Sleep apnea  [ ] COPD  [ ] BMI> 35  [ ] Liver dysfunction  [ ] Renal dysfunction  [ ] CHF  [ ] Smoker  [ ]  Age > 60 years    [ ]  [place]  Reviewed and Copied to Chart. See below.    Plan of care and goal oriented pain management treatment options were discussed with patient and /or primary care giver; all questions and concerns were addressed and care was aligned with patient's wishes.    Educated patient on goal oriented pain management treatment options        Source of information: MARTELL GEE, Chart review  Patient language: English  : n/a    HPI:  82 year old female, from home, ambulates with cane, PMH HTN, HLD, DM, prior brain surgery (benign meningioma), prior cerebellar CVA, presents to the ED after fall at home. Pt states that 2 nights ago, she was walking to her bedroom and felt as if the distance was further. She states it was dark and she fell. Pt was not able to get up on her own. She states she felt vertigo, "the room spinning" after she fell and is not sure if she lost consciousness or not. She states she has 6/10 pain in her right shoulder. Her neighbor called EMS the next day when she checked in on her. Pt states that she recently traveled back from [facility] a couple weeks ago. She also recently stopped taking Ozempic 1 week ago due to symptoms of nausea and diarrhea which she attributed to the medication. Pt found to be COVID+ in the ED. She states she has post-nasal drip and minor chills but otherwise denies headache, fever, cough, shortness of breath, sore throat, chest pain, palpitations, abdominal pain, diarrhea, constipation or dysuria.    In ED: vitals /69, HR 96, Temp 98.1F, 98% on RA  CT Head and Cervical Spine shows no acute intracranial CT abnormality. No evidence of acute cervical spine fracture or traumatic malalignment, within the limitations of motion artifact. Hemorrhage in the right axillary region, secondary to displaced proximal humeral fracture demonstrated on the shoulder radiographs.  s/p 500cc IVF NS bolus in ED (19 Oct 2022 09:24)      Pt is admitted for s/p Fall. Pain consulted for Rt shoulder/arm pain. Pt with displaced proximal humeral fracture showed on CT. Pt seen and examined at bedside. + covid 10/19. Airborne/ contact precautions maintained. Patient found sitting in bed, with PT at bedside, able to get out of bed with assistance using walker, able to stand up and walk side to side a few steps, no dizziness. Reports pain score 10/10 on R arm/shoulder and not tolerable.  SCALE USED: (1-10 VNRS). Pt describes pain as sharp, radiating from R shoulder down to R arm, not alleviated by pain medication and exacerbated by movement. R arm sling in place. Patient has taken Tylenol 650 mg po and Tramadol 25 mg po as ordered without relief.  Pt tolerating PO diet. Denies lethargy, nausea, vomiting, itchiness. Reports last BM 10/18, reports occasionally constipation. Patient stated goal for pain control: to be able to take deep breaths, get out of bed to chair and ambulate with tolerable pain control. Pt reports taking Tylenol 500 mg po for mild pain and headaches at home as needed. Patient stating living alone at home, baseline uses cane for walking and when outside uses walker. Patient seen by Ortho team, will treat Fx conservatively. Discussed with MD Mayorga, Morphine 2 mg IV q6h prn ordered for severe pain. RN informed. Will add Toradol 15 mg IV q6h and increase Tylenol 1000 mg PO q8h. Will follow.    PAST MEDICAL & SURGICAL HISTORY:  Brain Tumor (Benign)    Nephrolithiasis    Headache  undiagnosed for years- exacerbation treated with Prednisone    Hyperlipidemia    Diabetes    Ischemic stroke    HTN (hypertension)    DM (diabetes mellitus)    HLD (hyperlipidemia)    Brain Tumor (Benign)    H/O brain surgery  benign    FAMILY HISTORY:  Family history of cardiomyopathy    FH: heart attack (Father)    FH: heart disease (Child)    Social History:  Pt lives at home alone, no HHA. Denies tobacco, alcohol or illicit drug use. (19 Oct 2022 09:24)   [x] Denies ETOH use, illicit drug use and smoking    Allergies    penicillin (Unknown)    Intolerances    MEDICATIONS  (STANDING):  acetaminophen     Tablet .. 650 milliGRAM(s) Oral every 8 hours  atorvastatin 40 milliGRAM(s) Oral at bedtime  enoxaparin Injectable 40 milliGRAM(s) SubCutaneous every 12 hours  insulin lispro (ADMELOG) corrective regimen sliding scale   SubCutaneous three times a day before meals  insulin lispro (ADMELOG) corrective regimen sliding scale   SubCutaneous at bedtime  lidocaine   4% Patch 1 Patch Transdermal daily  metoprolol succinate ER 12.5 milliGRAM(s) Oral daily  pantoprazole    Tablet 40 milliGRAM(s) Oral before breakfast  senna 2 Tablet(s) Oral at bedtime  sodium chloride 0.9%. 1000 milliLiter(s) (75 mL/Hr) IV Continuous <Continuous>    MEDICATIONS  (PRN):  aluminum hydroxide/magnesium hydroxide/simethicone Suspension 30 milliLiter(s) Oral every 4 hours PRN Dyspepsia  melatonin 3 milliGRAM(s) Oral at bedtime PRN Insomnia  morphine  - Injectable 2 milliGRAM(s) IV Push every 6 hours PRN Severe Pain (7 - 10)  ondansetron Injectable 4 milliGRAM(s) IV Push every 8 hours PRN Nausea and/or Vomiting  traMADol 50 milliGRAM(s) Oral every 6 hours PRN Moderate Pain (4 - 6)    Vital Signs Last 24 Hrs  T(C): 36.3 (21 Oct 2022 13:37), Max: 36.7 (20 Oct 2022 15:25)  T(F): 97.3 (21 Oct 2022 13:37), Max: 98.1 (20 Oct 2022 15:25)  HR: 73 (21 Oct 2022 13:37) (73 - 90)  BP: 136/55 (21 Oct 2022 13:37) (101/51 - 136/55)  BP(mean): --  RR: 17 (21 Oct 2022 13:37) (17 - 17)  SpO2: 95% (21 Oct 2022 13:37) (95% - 96%)    Parameters below as of 21 Oct 2022 13:37  Patient On (Oxygen Delivery Method): room air    LABS: Reviewed.                          10.4   11.06 )-----------( 294      ( 21 Oct 2022 06:24 )             32.5     10-21    139  |  107  |  17  ----------------------------<  134<H>  3.7   |  25  |  0.48<L>    Ca    8.9      21 Oct 2022 06:24    TPro  6.3  /  Alb  2.5<L>  /  TBili  0.6  /  DBili  x   /  AST  24  /  ALT  19  /  AlkPhos  56  10-21    PT/INR - ( 21 Oct 2022 06:24 )   PT: 13.8 sec;   INR: 1.16 ratio       PTT - ( 21 Oct 2022 06:24 )  PTT:24.2 sec  LIVER FUNCTIONS - ( 21 Oct 2022 06:24 )  Alb: 2.5 g/dL / Pro: 6.3 g/dL / ALK PHOS: 56 U/L / ALT: 19 U/L DA / AST: 24 U/L / GGT: x           CAPILLARY BLOOD GLUCOSE    POCT Blood Glucose.: 114 mg/dL (21 Oct 2022 11:32)  POCT Blood Glucose.: 157 mg/dL (21 Oct 2022 07:40)  POCT Blood Glucose.: 135 mg/dL (20 Oct 2022 21:50)  POCT Blood Glucose.: 124 mg/dL (20 Oct 2022 17:52)    COVID-19 PCR: Detected (19 Oct 2022 00:13)    Radiology: Reviewed.   ACC: [id number] EXAM:  CT BRAIN                        ACC: [id number] EXAM:  CT CERVICAL SPINE                          PROCEDURE DATE:  10/19/2022      INTERPRETATION:  EXAMINATION: CT CERVICAL SPINE, CT HEAD    DATE: 10/19/2022 6:30 AM    INDICATION: Trauma    COMPARISON: None available    TECHNIQUE: Thin section noncontrast axial images were obtained through   the head and cervical spine. Multiplanar reformats submitted.    FINDINGS:    CT HEAD: No acute intracranial hemorrhage or suspicious extra-axial fluid   collection. Left frontal encephalomalacia with overlying craniectomy. No   focal edema or acute mass effect. No hydrocephalus or central herniation.   Moderate cerebral atrophy and mild chronic microvascular ischemic change.    Minimal paranasal sinus mucosal disease. Minimal opacification of the   mastoids, more prominent on the left. Scalp and image midfacial soft   tissues are unremarkable.    CT cervical spine:  The imaged skull base and craniovertebral alignment are intact. Vertebral   body heights are maintained. No definite listhesis, although motion   artifact partially degrades evaluation. Within the limitations of motion,   no acute fracture.    Degenerative disc disease most prominent at C5-6 where there is narrowing   of the central canal to 7 mm and moderate right foraminal stenosis.    Imaged lung apices are clear. Hemorrhage in the right axillary region. No   prevertebral edema.    IMPRESSION:  1. No acute intracranial CT abnormality.  2. No evidence ofacute cervical spine fracture or traumatic   malalignment, within the limitations of motion artifact.  3. Hemorrhage in the right axillary region, secondary to displaced   proximal humeral fracture demonstrated on the shoulder radiographs.    --- Endof Report ---    CRISTOBAL LEON MD; Attending Radiologist  This document has been electronically signed. Oct 19 2022  7:05AM  [facility]: [id number] EXAM:  XR HUMERUS MIN 2 VIEWS RT                          PROCEDURE DATE:  10/19/2022      INTERPRETATION:  Right humerus    HISTORY: Shoulder fracture     Two views of the right humerus show no evidence of fracture nor   destructive change below the surgical neck. The distal joint spaces are   maintained.    IMPRESSION: No acute bony pathology distally.    Thank you for this referral.    --- End of Report ---    YOLETTE MALIN MD; Attending Interventional Radiologist  This document has been electronically signed. Oct 21 2022  9:24AM  CC: [id number] EXAM:  XR SHOULDER COMP MIN 2V RT                          PROCEDURE DATE:  10/19/2022      INTERPRETATION:  Right shoulder    HISTORY: Patient fell     Three views of the right shoulder show angulated and displaced   comminuted fracture of the surgical neck. The joint spaces are maintained.    IMPRESSION: Surgical neck fracture.    The above findings correspond to a note entered into the hospital's image   software system by the emergency department staff at the time of the   study.     Thank you for this referral.    --- End of Report ---    YOLETTE MALIN MD; Attending Interventional Radiologist  This document has been electronically signed. Oct 21 2022  9:23AM    ORT Score -   Family Hx of substance abuse	Female	      Male  Alcohol 	                                           1                     3  Illegal drugs	                                   2                     3  Rx drugs                                           4 	                  4  Personal Hx of substance abuse		  Alcohol 	                                          3	                  3  Illegal drugs                                     4	                  4  Rx drugs                                            5 	                  5  Age between 16- 45 years	           1                     1  hx preadolescent sexual abuse	   3 	                  0  Psychological disease		  ADD, OCD, bipolar, schizophrenia   2	          2  Depression                                           1 	          1  Total: 0    a score of 3 or lower indicates low risk for opioid abuse		  a score of 4-7 indicates moderate risk for opioid abuse		  a score of 8 or higher indicates high risk for opioid abuse  	  REVIEW OF SYSTEMS:  CONSTITUTIONAL: No fever or fatigue  HEENT:  No difficulty hearing, no change in vision  NECK: No pain or stiffness  RESPIRATORY: No cough, wheezing, chills or hemoptysis; No shortness of breath  CARDIOVASCULAR: No chest pain, palpitations, dizziness, or leg swelling  GASTROINTESTINAL: No loss of appetite, + decreased PO intake. No abdominal or epigastric pain. No nausea, vomiting; No diarrhea, + constipation.   GENITOURINARY: No dysuria, frequency, hematuria, retention or incontinence  MUSCULOSKELETAL: + R shoulder/arm joint pain and swelling; No muscle, back, or extremity pain, no upper or lower motor strength weakness, no saddle anesthesia, bowel/bladder incontinence, + falls   NEURO: No headaches, No numbness/tingling b/l LE, No weakness  ENDOCRINE: No polyuria, polydipsia, heat or cold intolerance; No hair loss  PSYCHIATRIC: No depression, anxiety or difficulty sleeping    PHYSICAL EXAM:  GENERAL:  Alert & Oriented X4, cooperative, NAD. Speech is clear.   RESPIRATORY: Respirations even and unlabored. Clear to auscultation bilaterally; No rales, rhonchi, wheezing, or rubs  CARDIOVASCULAR: Normal S1/S2, regular rate and rhythm; No murmurs, rubs, or gallops. No JVD.   GASTROINTESTINAL:  Soft, Nontender, Nondistended; Bowel sounds present  PERIPHERAL VASCULAR:  Extremities warm without edema. 2+ Peripheral Pulses, No cyanosis, No calf tenderness  MUSCULOSKELETAL: Motor Strength 3/5 B/L upper and 5/5 lower extremities; moves all extremities equally against gravity; ROM decreased RUE; negative SLR; +tenderness on palpation of R shoulder  SKIN: Warm, dry, intact. No rashes, lesions, scars or wounds. R upper shoulder/arm with +ecchymosis. R arm sling in place.    Risk factors associated with adverse outcomes related to opioid treatment  [ ]  Concurrent benzodiazepine use  [ ]  History/ Active substance use or alcohol use disorder  [ ] Psychiatric co-morbidity  [ ] Sleep apnea  [ ] COPD  [ ] BMI> 35  [ ] Liver dysfunction  [ ] Renal dysfunction  [ ] CHF  [ ] Smoker  [x]  Age > 60 years    [x)  [place]  Reviewed and Copied to Chart. See below.    Plan of care and goal oriented pain management treatment options were discussed with patient and /or primary care giver; all questions and concerns were addressed and care was aligned with patient's wishes.    Educated patient on goal oriented pain management treatment options

## 2022-10-21 NOTE — PROGRESS NOTE ADULT - ASSESSMENT
82 year old female, from home, ambulates with cane, PMH HTN, HLD, DM, prior brain surgery (benign meningioma), prior cerebellar CVA, presents to the ED after fall at home. Found to be COVID + and X-Ray shows acute displaced angulated right humeral neck fracture. Admitted to medicine for syncope workup and management of humeral fracture. Ortho consulted and recommended conservative management.    10/21- pt seen and examined at bedside,  pending PT evaluation. Tramadol increased to 50mg for severe pain. Patient COVID positive but remains asymptomatic.

## 2022-10-21 NOTE — CONSULT NOTE ADULT - ASSESSMENT
Confidential Drug Utilization Report  Search Terms: Yarelis Cordero, 1940Search Date: 10/21/2022 15:03:11 PM  The Drug Utilization Report below displays all of the controlled substance prescriptions, if any, that your patient has filled in the last twelve months. The information displayed on this report is compiled from pharmacy submissions to the Department, and accurately reflects the information as submitted by the pharmacies.    This report was requested by: Massiel Mckoy | Reference #: [id number]    There are no results for the search terms that you entered.

## 2022-10-21 NOTE — PHYSICAL THERAPY INITIAL EVALUATION ADULT - RANGE OF MOTION EXAMINATION, REHAB EVAL
RUE in functional position with sling/Left LE ROM was WFL (within functional limits)/bilateral lower extremity ROM was WFL (within functional limits)

## 2022-10-21 NOTE — PHYSICAL THERAPY INITIAL EVALUATION ADULT - IMPAIRMENTS FOUND, PT EVAL
aerobic capacity/endurance/fine motor/gait, locomotion, and balance/gross motor/joint integrity and mobility/muscle strength/posture/ROM

## 2022-10-21 NOTE — PHYSICAL THERAPY INITIAL EVALUATION ADULT - GENERAL OBSERVATIONS, REHAB EVAL
pt on isolation, room air, right humeral fx, assistance required with nicola walker for gait and transfers secondary to unsteady gait

## 2022-10-21 NOTE — PROGRESS NOTE ADULT - PROBLEM SELECTOR PLAN 1
-s/p fall at home   -CT showed displaced proximal humeral fracture  -Ortho following- conservative management recommended   -cont pain mgmt   -Tylenol and Tramadol  -PT evaluation  - f/u recommendations

## 2022-10-21 NOTE — PROGRESS NOTE ADULT - SUBJECTIVE AND OBJECTIVE BOX
NP Note discussed with Primary Attending.    Patient is a 82y old  Female who presents with a chief complaint of fall, right humerus fracture (20 Oct 2022 11:28)      INTERVAL HPI/OVERNIGHT EVENTS: no new complaints    MEDICATIONS  (STANDING):  acetaminophen     Tablet .. 650 milliGRAM(s) Oral every 8 hours  atorvastatin 40 milliGRAM(s) Oral at bedtime  enoxaparin Injectable 40 milliGRAM(s) SubCutaneous every 12 hours  insulin lispro (ADMELOG) corrective regimen sliding scale   SubCutaneous three times a day before meals  insulin lispro (ADMELOG) corrective regimen sliding scale   SubCutaneous at bedtime  lidocaine   4% Patch 1 Patch Transdermal daily  metoprolol succinate ER 12.5 milliGRAM(s) Oral daily  pantoprazole    Tablet 40 milliGRAM(s) Oral before breakfast  senna 2 Tablet(s) Oral at bedtime  sodium chloride 0.9%. 1000 milliLiter(s) (75 mL/Hr) IV Continuous <Continuous>  traMADol 50 milliGRAM(s) Oral every 6 hours    MEDICATIONS  (PRN):  aluminum hydroxide/magnesium hydroxide/simethicone Suspension 30 milliLiter(s) Oral every 4 hours PRN Dyspepsia  melatonin 3 milliGRAM(s) Oral at bedtime PRN Insomnia  ondansetron Injectable 4 milliGRAM(s) IV Push every 8 hours PRN Nausea and/or Vomiting      __________________________________________________  REVIEW OF SYSTEMS:    CONSTITUTIONAL: No fever,   EYES: no acute visual disturbances  NECK: No pain or stiffness  RESPIRATORY: No cough; No shortness of breath  CARDIOVASCULAR: No chest pain, no palpitations  GASTROINTESTINAL: No pain. No nausea or vomiting; No diarrhea   NEUROLOGICAL: No headache or numbness, no tremors  MUSCULOSKELETAL: rt shoulder pain, no muscle pain  GENITOURINARY: no dysuria, no frequency, no hesitancy  PSYCHIATRY: no depression , no anxiety  ALL OTHER  ROS negative        Vital Signs Last 24 Hrs  T(C): 36.3 (21 Oct 2022 05:33), Max: 36.7 (20 Oct 2022 15:25)  T(F): 97.3 (21 Oct 2022 05:33), Max: 98.1 (20 Oct 2022 15:25)  HR: 80 (21 Oct 2022 05:33) (80 - 90)  BP: 125/45 (21 Oct 2022 05:33) (101/51 - 125/45)  BP(mean): --  RR: 17 (21 Oct 2022 05:33) (17 - 17)  SpO2: 95% (21 Oct 2022 05:33) (95% - 96%)    Parameters below as of 21 Oct 2022 05:33  Patient On (Oxygen Delivery Method): room air        ________________________________________________  PHYSICAL EXAM:  GENERAL: NAD  HEENT: Normocephalic;  conjunctivae and sclerae clear; moist mucous membranes;   NECK : supple  CHEST/LUNG: Clear to auscultation bilaterally with good air entry   HEART: S1 S2  regular; no murmurs, gallops or rubs  ABDOMEN: Soft, Nontender, Nondistended; Bowel sounds present  EXTREMITIES: no cyanosis; no edema; no calf tenderness  SKIN: warm and dry; no rash  NERVOUS SYSTEM:  Awake and alert; Oriented  to place, person and time ; no new deficits    _________________________________________________  LABS:                        10.4   11.06 )-----------( 294      ( 21 Oct 2022 06:24 )             32.5     10-21    139  |  107  |  17  ----------------------------<  134<H>  3.7   |  25  |  0.48<L>    Ca    8.9      21 Oct 2022 06:24    TPro  6.3  /  Alb  2.5<L>  /  TBili  0.6  /  DBili  x   /  AST  24  /  ALT  19  /  AlkPhos  56  10-21    PT/INR - ( 21 Oct 2022 06:24 )   PT: 13.8 sec;   INR: 1.16 ratio         PTT - ( 21 Oct 2022 06:24 )  PTT:24.2 sec    CAPILLARY BLOOD GLUCOSE      POCT Blood Glucose.: 114 mg/dL (21 Oct 2022 11:32)  POCT Blood Glucose.: 157 mg/dL (21 Oct 2022 07:40)  POCT Blood Glucose.: 135 mg/dL (20 Oct 2022 21:50)  POCT Blood Glucose.: 124 mg/dL (20 Oct 2022 17:52)        RADIOLOGY & ADDITIONAL TESTS:    ACC: [id number] EXAM:  CT BRAIN                        ACC: [id number] EXAM:  CT CERVICAL SPINE                          PROCEDURE DATE:  10/19/2022          INTERPRETATION:  EXAMINATION: CT CERVICAL SPINE, CT HEAD    DATE: 10/19/2022 6:30 AM    INDICATION: Trauma    COMPARISON: None available    TECHNIQUE: Thin section noncontrast axial images were obtained through   the head and cervical spine. Multiplanar reformats submitted.    FINDINGS:    CT HEAD: No acute intracranial hemorrhage or suspicious extra-axial fluid   collection. Left frontal encephalomalacia with overlying craniectomy. No   focal edema or acute mass effect. No hydrocephalus or central herniation.   Moderate cerebral atrophy and mild chronic microvascular ischemic change.    Minimal paranasal sinus mucosal disease. Minimal opacification of the   mastoids, more prominent on the left. Scalp and image midfacial soft   tissues are unremarkable.    CT cervical spine:  The imaged skull base and craniovertebral alignment are intact. Vertebral   body heights are maintained. No definite listhesis, although motion   artifact partially degrades evaluation. Within the limitations of motion,   no acute fracture.    Degenerative disc disease most prominent at C5-6 where there is narrowing   of the central canal to 7 mm and moderate right foraminal stenosis.    Imaged lung apices are clear. Hemorrhage in the right axillary region. No   prevertebral edema.    IMPRESSION:  1. No acute intracranial CT abnormality.  2. No evidence ofacute cervical spine fracture or traumatic   malalignment, within the limitations of motion artifact.  3. Hemorrhage in the right axillary region, secondary to displaced   proximal humeral fracture demonstrated on the shoulder radiographs.    --- Endof Report ---            CRISTOBAL LEON MD; Attending Radiologist  This document has been electronically signed. Oct 19 2022  7:05AM    ACC: [id number] EXAM:  CT BRAIN                        ACC: [id number] EXAM:  CT CERVICAL SPINE                          PROCEDURE DATE:  10/19/2022          INTERPRETATION:  EXAMINATION: CT CERVICAL SPINE, CT HEAD    DATE: 10/19/2022 6:30 AM    INDICATION: Trauma    COMPARISON: None available    TECHNIQUE: Thin section noncontrast axial images were obtained through   the head and cervical spine. Multiplanar reformats submitted.    FINDINGS:    CT HEAD: No acute intracranial hemorrhage or suspicious extra-axial fluid   collection. Left frontal encephalomalacia with overlying craniectomy. No   focal edema or acute mass effect. No hydrocephalus or central herniation.   Moderate cerebral atrophy and mild chronic microvascular ischemic change.    Minimal paranasal sinus mucosal disease. Minimal opacification of the   mastoids, more prominent on the left. Scalp and image midfacial soft   tissues are unremarkable.    CT cervical spine:  The imaged skull base and craniovertebral alignment are intact. Vertebral   body heights are maintained. No definite listhesis, although motion   artifact partially degrades evaluation. Within the limitations of motion,   no acute fracture.    Degenerative disc disease most prominent at C5-6 where there is narrowing   of the central canal to 7 mm and moderate right foraminal stenosis.    Imaged lung apices are clear. Hemorrhage in the right axillary region. No   prevertebral edema.    IMPRESSION:  1. No acute intracranial CT abnormality.  2. No evidence ofacute cervical spine fracture or traumatic   malalignment, within the limitations of motion artifact.  3. Hemorrhage in the right axillary region, secondary to displaced   proximal humeral fracture demonstrated on the shoulder radiographs.    --- Endof Report ---            CRISTOBAL LENO MD; Attending Radiologist  This document has been electronically signed. Oct 19 2022  7:05AM    ACC: [id number] EXAM:  XR HUMERUS MIN 2 VIEWS RT                          PROCEDURE DATE:  10/19/2022          INTERPRETATION:  Right humerus    HISTORY: Shoulder fracture     Two views of the right humerus show no evidence of fracture nor   destructive change below the surgical neck. The distal joint spaces are   maintained.    IMPRESSION: No acute bony pathology distally.        Thank you for this referral.    --- End of Report ---            YOLETTE MALIN MD; Attending Interventional Radiologist  This document has been electronically signed. Oct 21 2022  9:24AM      Imaging  Reviewed:  YES/NO  Consultant(s) Notes Reviewed:   YES/ No      Plan of care was discussed with patient and /or primary care giver; all questions and concerns were addressed

## 2022-10-21 NOTE — CONSULT NOTE ADULT - PROBLEM SELECTOR RECOMMENDATION 9
Pt with pain which is somatic in nature due to displaced proximal humeral fracture showed on CT. Patient seen by Ortho team, treat Fx conservatively. High risk medications reviewed. Avoid polypharmacy. Avoid IV opioids. Avoid NSAIDs and benzodiazepines. Non-pharmacological sleep aides initiated. Non-opioid medications and non-pharmacological pain management measures initiated.  Opioid pain recommendations   - Continue Morphine 2 mg IV q6h prn for severe pain. Monitor for sedation/ respiratory depression.   - Continue Tramadol 50mg PO q 6 hours PRN moderate pain. Monitor for sedation/ respiratory depression. If Tramadol not working consider switching to Oxycodone 5 mg PO q 4 hours PRN for moderate pain  Non-opioid pain recommendations   - Start Toradol 15mg IVP q 6 hours severe pain. Discussed with MD Mayorga  - Increase Acetaminophen 1 gram PO q 8 hours for 3 days. Monitor LFTs  - Continue Lidoderm 5% patch daily.   Bowel Regimen  - Continue Miralax 17G PO daily  - Continue Senna 2 tablets at bedtime for constipation  - Dulcolax 5 mg po x 1 dose for constipation  Mild pain   - Non-pharmacological pain treatment recommendations  - Warm/ Cool packs PRN   - Repositioning, imagery, relaxation, distraction.  - Physical therapy OOB if no contraindications   Recommendations discussed with primary team MD Mayorga and RN

## 2022-10-22 LAB
ALBUMIN SERPL ELPH-MCNC: 2.3 G/DL — LOW (ref 3.5–5)
ALP SERPL-CCNC: 55 U/L — SIGNIFICANT CHANGE UP (ref 40–120)
ALT FLD-CCNC: 18 U/L DA — SIGNIFICANT CHANGE UP (ref 10–60)
ANION GAP SERPL CALC-SCNC: 7 MMOL/L — SIGNIFICANT CHANGE UP (ref 5–17)
AST SERPL-CCNC: 18 U/L — SIGNIFICANT CHANGE UP (ref 10–40)
BILIRUB SERPL-MCNC: 0.6 MG/DL — SIGNIFICANT CHANGE UP (ref 0.2–1.2)
BUN SERPL-MCNC: 13 MG/DL — SIGNIFICANT CHANGE UP (ref 7–18)
CALCIUM SERPL-MCNC: 9 MG/DL — SIGNIFICANT CHANGE UP (ref 8.4–10.5)
CHLORIDE SERPL-SCNC: 107 MMOL/L — SIGNIFICANT CHANGE UP (ref 96–108)
CO2 SERPL-SCNC: 27 MMOL/L — SIGNIFICANT CHANGE UP (ref 22–31)
CREAT SERPL-MCNC: 0.49 MG/DL — LOW (ref 0.5–1.3)
EGFR: 93 ML/MIN/1.73M2 — SIGNIFICANT CHANGE UP
GLUCOSE BLDC GLUCOMTR-MCNC: 101 MG/DL — HIGH (ref 70–99)
GLUCOSE BLDC GLUCOMTR-MCNC: 104 MG/DL — HIGH (ref 70–99)
GLUCOSE BLDC GLUCOMTR-MCNC: 105 MG/DL — HIGH (ref 70–99)
GLUCOSE BLDC GLUCOMTR-MCNC: 111 MG/DL — HIGH (ref 70–99)
GLUCOSE SERPL-MCNC: 103 MG/DL — HIGH (ref 70–99)
HCT VFR BLD CALC: 33.9 % — LOW (ref 34.5–45)
HGB BLD-MCNC: 10.8 G/DL — LOW (ref 11.5–15.5)
MCHC RBC-ENTMCNC: 29.6 PG — SIGNIFICANT CHANGE UP (ref 27–34)
MCHC RBC-ENTMCNC: 31.9 GM/DL — LOW (ref 32–36)
MCV RBC AUTO: 92.9 FL — SIGNIFICANT CHANGE UP (ref 80–100)
NRBC # BLD: 0 /100 WBCS — SIGNIFICANT CHANGE UP (ref 0–0)
PLATELET # BLD AUTO: 333 K/UL — SIGNIFICANT CHANGE UP (ref 150–400)
POTASSIUM SERPL-MCNC: 4.1 MMOL/L — SIGNIFICANT CHANGE UP (ref 3.5–5.3)
POTASSIUM SERPL-SCNC: 4.1 MMOL/L — SIGNIFICANT CHANGE UP (ref 3.5–5.3)
PROT SERPL-MCNC: 6 G/DL — SIGNIFICANT CHANGE UP (ref 6–8.3)
RBC # BLD: 3.65 M/UL — LOW (ref 3.8–5.2)
RBC # FLD: 13.8 % — SIGNIFICANT CHANGE UP (ref 10.3–14.5)
SODIUM SERPL-SCNC: 141 MMOL/L — SIGNIFICANT CHANGE UP (ref 135–145)
WBC # BLD: 10.23 K/UL — SIGNIFICANT CHANGE UP (ref 3.8–10.5)
WBC # FLD AUTO: 10.23 K/UL — SIGNIFICANT CHANGE UP (ref 3.8–10.5)

## 2022-10-22 PROCEDURE — 99233 SBSQ HOSP IP/OBS HIGH 50: CPT

## 2022-10-22 RX ORDER — ACETAMINOPHEN 500 MG
1000 TABLET ORAL ONCE
Refills: 0 | Status: COMPLETED | OUTPATIENT
Start: 2022-10-22 | End: 2022-10-22

## 2022-10-22 RX ADMIN — Medication 1000 MILLIGRAM(S): at 02:44

## 2022-10-22 RX ADMIN — Medication 1000 MILLIGRAM(S): at 03:44

## 2022-10-22 RX ADMIN — TRAMADOL HYDROCHLORIDE 50 MILLIGRAM(S): 50 TABLET ORAL at 21:30

## 2022-10-22 RX ADMIN — LIDOCAINE 1 PATCH: 4 CREAM TOPICAL at 13:23

## 2022-10-22 RX ADMIN — Medication 15 MILLIGRAM(S): at 07:34

## 2022-10-22 RX ADMIN — ENOXAPARIN SODIUM 40 MILLIGRAM(S): 100 INJECTION SUBCUTANEOUS at 18:50

## 2022-10-22 RX ADMIN — TRAMADOL HYDROCHLORIDE 50 MILLIGRAM(S): 50 TABLET ORAL at 13:25

## 2022-10-22 RX ADMIN — Medication 1000 MILLIGRAM(S): at 23:00

## 2022-10-22 RX ADMIN — Medication 15 MILLIGRAM(S): at 07:50

## 2022-10-22 RX ADMIN — Medication 5 MILLIGRAM(S): at 18:50

## 2022-10-22 RX ADMIN — Medication 1000 MILLIGRAM(S): at 22:14

## 2022-10-22 RX ADMIN — TRAMADOL HYDROCHLORIDE 50 MILLIGRAM(S): 50 TABLET ORAL at 20:23

## 2022-10-22 RX ADMIN — TRAMADOL HYDROCHLORIDE 50 MILLIGRAM(S): 50 TABLET ORAL at 14:25

## 2022-10-22 RX ADMIN — LIDOCAINE 1 PATCH: 4 CREAM TOPICAL at 20:00

## 2022-10-22 NOTE — PROGRESS NOTE ADULT - SUBJECTIVE AND OBJECTIVE BOX
Patient seen and examined this afternoon around 1.30 PM.     82 year old female, from home, ambulates with cane, PM HTN, HLD, DM, prior brain surgery (benign meningioma), prior cerebellar CVA, presents to the ED after fall at home. Pt states that 2 nights ago, she was walking to her bedroom it was dark and she fell. Pt was not able to get up on her own. She states she felt vertigo, "the room spinning" after she fell and is not sure if she lost consciousness or not. She states she has 6/10 pain in her right shoulder. Her neighbor called EMS the next day when she checked in on her. Pt found to be COVID+ in the ED. Also with Right shoulder fracture.     Patient was away to [facility] in August to September after Son passed away. She just returned at the end of September. She is visited by her grandkids usually. Marcia is NOK. She reported also stop taking Ozempic last week after she had Nausea and stomach upset but has been tolerating well for last few months.    Patient doing much better today; pain much better controlled.   denies SOB, palpitations, chest pain, nausea, vomiting, diarrhea, constipation, dizziness    Vital Signs Last 24 Hrs  T(C): 36.7 (22 Oct 2022 14:15), Max: 36.8 (21 Oct 2022 20:41)  T(F): 98 (22 Oct 2022 14:15), Max: 98.2 (21 Oct 2022 20:41)  HR: 77 (22 Oct 2022 14:15) (71 - 89)  BP: 124/60 (22 Oct 2022 14:15) (122/56 - 136/49)  RR: 18 (22 Oct 2022 14:15) (17 - 18)  SpO2: 98% (22 Oct 2022 14:15) (93% - 100%)  room air    P/E:  elderly female, comfortable at rest , NAD  Psych: AAO x3  Neuro: No gross focal deficits; Power and sensation intact  CVS: S1S2 present, regular, no edema  Resp: BLAE+, No wheeze or Rhonchi  GI: Soft, BS+, Non tender, non distended  Extr: No  calf tenderness B/L Lower extremities  Right shoulder swelling, tenderness and limited ROM due to fracture; right arm sling in place  Skin: Warm and moist without any rashes    Labs:                           10.8   10.23 )-----------( 333      ( 22 Oct 2022 07:26 )             33.9   10-22    141  |  107  |  13  ----------------------------<  103<H>  4.1   |  27  |  0.49<L>    Ca    9.0      22 Oct 2022 07:26    TPro  6.0  /  Alb  2.3<L>  /  TBili  0.6  /  DBili  x   /  AST  18  /  ALT  18  /  AlkPhos  55  10-22      Ferritin, Serum in AM (10.21.22 @ 06:24) Ferritin, Serum: 359 ng/mL   Lactate Dehydrogenase, Serum in AM (10.21.22 @ 06:24) Lactate Dehydrogenase, Serum: 181 U/L   D-Dimer Assay, Quantitative (10.21.22 @ 06:24) D-Dimer Assay, Quantitative: 753:   D-Dimer Assay, Quantitative (10.19.22 @ 07:16) D-Dimer Assay, Quantitative: 1258    ACC: [id number] EXAM:  CT BRAIN                        ACC: [id number] EXAM:  CT CERVICAL SPINE                        PROCEDURE DATE:  10/19/2022    IMPRESSION:  1. No acute intracranial CT abnormality.  2. No evidence of acute cervical spine fracture or traumatic malalignment, within the limitations of motion artifact.  3. Hemorrhage in the right axillary region, secondary to displaced proximal humeral fracture demonstrated on the shoulder radiographs.    Xray Chest 1 View AP/PA (10.19.22 @ 04:48)  IMPRESSION: No acute infiltrate. Displaced angulated right humeral neck fracture with overlap.     Patient seen and examined this afternoon around 1.30 PM.     82 year old female, from home, ambulates with cane, [facility] HTN, HLD, DM, prior brain surgery (benign meningioma), prior cerebellar CVA, presents to the ED after fall at home. Pt states that 2 nights ago, she was walking to her bedroom it was dark and she fell. Pt was not able to get up on her own. She states she felt vertigo, "the room spinning" after she fell and is not sure if she lost consciousness or not. She states she has 6/10 pain in her right shoulder. Her neighbor called EMS the next day when she checked in on her. Pt found to be COVID+ in the ED. Also with Right shoulder fracture.     Patient was away to [facility] in August to September after Son passed away. She just returned at the end of September. She is visited by her grandkids usually. Marcia is NOK. She reported also stop taking Ozempic last week after she had Nausea and stomach upset but has been tolerating well for last few months.    Patient doing much better today; pain much better controlled.   denies SOB, palpitations, chest pain, nausea, vomiting, diarrhea, constipation, dizziness    MEDICATIONS  (STANDING):  acetaminophen     Tablet .. 1000 milliGRAM(s) Oral every 8 hours  atorvastatin 40 milliGRAM(s) Oral at bedtime  enoxaparin Injectable 40 milliGRAM(s) SubCutaneous every 12 hours  insulin lispro (ADMELOG) corrective regimen sliding scale   SubCutaneous three times a day before meals  insulin lispro (ADMELOG) corrective regimen sliding scale   SubCutaneous at bedtime  ketorolac   Injectable 15 milliGRAM(s) IV Push every 6 hours  lidocaine   4% Patch 1 Patch Transdermal daily  metoprolol succinate ER 12.5 milliGRAM(s) Oral daily  pantoprazole    Tablet 40 milliGRAM(s) Oral before breakfast  senna 2 Tablet(s) Oral at bedtime  sodium chloride 0.9%. 1000 milliLiter(s) (75 mL/Hr) IV Continuous <Continuous>    MEDICATIONS  (PRN):  aluminum hydroxide/magnesium hydroxide/simethicone Suspension 30 milliLiter(s) Oral every 4 hours PRN Dyspepsia  magnesium hydroxide Suspension 30 milliLiter(s) Oral daily PRN Constipation  melatonin 3 milliGRAM(s) Oral at bedtime PRN Insomnia  morphine  - Injectable 2 milliGRAM(s) IV Push every 6 hours PRN Severe Pain (7 - 10)  ondansetron Injectable 4 milliGRAM(s) IV Push every 8 hours PRN Nausea and/or Vomiting  traMADol 50 milliGRAM(s) Oral every 6 hours PRN Moderate Pain (4 - 6)      Vital Signs Last 24 Hrs  T(C): 36.7 (22 Oct 2022 14:15), Max: 36.8 (21 Oct 2022 20:41)  T(F): 98 (22 Oct 2022 14:15), Max: 98.2 (21 Oct 2022 20:41)  HR: 77 (22 Oct 2022 14:15) (71 - 89)  BP: 124/60 (22 Oct 2022 14:15) (122/56 - 136/49)  RR: 18 (22 Oct 2022 14:15) (17 - 18)  SpO2: 98% (22 Oct 2022 14:15) (93% - 100%)  room air    P/E:  elderly female, comfortable at rest , NAD  Psych: AAO x3  Neuro: No gross focal deficits; Power and sensation intact  CVS: S1S2 present, regular, no edema  Resp: BLAE+, No wheeze or Rhonchi  GI: Soft, BS+, Non tender, non distended  Extr: No  calf tenderness B/L Lower extremities  Right shoulder swelling, tenderness and limited ROM due to fracture; right arm sling in place  Skin: Warm and moist without any rashes    Labs:                       10.8   10.23 )-----------( 333      ( 22 Oct 2022 07:26 )             33.9   10-22    141  |  107  |  13  ----------------------------<  103<H>  4.1   |  27  |  0.49<L>    Ca    9.0      22 Oct 2022 07:26    TPro  6.0  /  Alb  2.3<L>  /  TBili  0.6  /  DBili  x   /  AST  18  /  ALT  18  /  AlkPhos  55  10-22    A1C with Estimated Average Glucose (10.19.22 @ 10:12) A1C with Estimated Average Glucose Result: 6.1:  Ferritin, Serum in AM (10.21.22 @ 06:24) Ferritin, Serum: 359 ng/mL   Lactate Dehydrogenase, Serum in AM (10.21.22 @ 06:24) Lactate Dehydrogenase, Serum: 181 U/L   D-Dimer Assay, Quantitative (10.21.22 @ 06:24) D-Dimer Assay, Quantitative: 753:   D-Dimer Assay, Quantitative (10.19.22 @ 07:16) D-Dimer Assay, Quantitative: 1258    CAPILLARY BLOOD GLUCOSE  POCT Blood Glucose.: 105 mg/dL (22 Oct 2022 21:15)  POCT Blood Glucose.: 101 mg/dL (22 Oct 2022 16:42)  POCT Blood Glucose.: 111 mg/dL (22 Oct 2022 11:51)  POCT Blood Glucose.: 104 mg/dL (22 Oct 2022 07:56)      ACC: [id number] EXAM:  CT BRAIN                        ACC: [id number] EXAM:  CT CERVICAL SPINE                        PROCEDURE DATE:  10/19/2022    IMPRESSION:  1. No acute intracranial CT abnormality.  2. No evidence of acute cervical spine fracture or traumatic malalignment, within the limitations of motion artifact.  3. Hemorrhage in the right axillary region, secondary to displaced proximal humeral fracture demonstrated on the shoulder radiographs.    Xray Chest 1 View AP/PA (10.19.22 @ 04:48)  IMPRESSION: No acute infiltrate. Displaced angulated right humeral neck fracture with overlap.

## 2022-10-22 NOTE — PROGRESS NOTE ADULT - ASSESSMENT
D/D:  s/p Mechanical fall likely due to dehydration with volume depletion due to   COVID 19 infection plus age related physical debility likely  Right shoulder humeral neck fracture s/p fall conservative mgmt as per Ortho  Elevated TSH likely Sick Euthyroid state  Type 2 DM controlled  HTN controlled    Plan:   Patient appear Euvolemic now; eating better; may D/C IV fluid  Orthopedic consult recommended conservative mgmt; right arm sling in place  Pain well controlled with current regimen; Pain mgt follow up    Pain control with Tylenol q 8 hrs x 2-3 days routine and Lidocaine patch; Use Tramadol PRN for breakthrough severe pain  Continuer Metoprolol ER 12.5 mg daily  PT eval recommend IRIS; Patient in agreement; follow up with Case mgmt. Granddaughter assisting patient with choices  repeat COVID swab Monday  ISS for now;May resume Ozempic as outpatient.    May resume Metformin AM if sugars elevated persistently above 150; still 120 to 150 range.   DVT ppx with Lovenox will increase to BID as D-dimer was elevated >1000  Repeat inflammatory markers in AM (ordered)  Follow up TSH as outpatient; If still inhouse will repeat in couple of days    Discussed with patient D/D:  s/p Mechanical fall likely due to dehydration with volume depletion due to   COVID 19 infection plus age related physical debility likely  Right shoulder humeral neck fracture s/p fall conservative mgmt as per Ortho  Elevated TSH likely Sick Euthyroid state  Type 2 DM controlled  HTN controlled    Plan:   Patient appear Euvolemic now; eating better; may D/C IV fluid  Orthopedic consult recommended conservative mgmt; right arm sling in place  Pain well controlled with current regimen; Pain mgt follow up  Change Toradol to PRN AM; continue Tylenol 1gm q 8 hrs for 1-2 days more  Continue Lidocaine patch; Use Tramadol PRN for moderate pain and Toradol for severe pain  Continuer Metoprolol ER 12.5 mg daily  PT eval recommend IRIS; Patient in agreement; follow up with Case mgmt. Granddaughter assisting patient with choices  repeat COVID swab Monday  ISS for now; May resume Ozempic as outpatient if sugars morethan 200.     May resume Metformin if sugars elevated persistently above 150; still 120 to 150 range. A1c 6.1 suggesting tight control in an elderly.   DVT ppx with Lovenox  BID as D-dimer was elevated >1000 but trending down  Repeat inflammatory markers Monday. Hold all labs tomorrow  Follow up TSH also on Monday.     Discussed with patient findings and plan of care  OOB to chair

## 2022-10-23 LAB
GLUCOSE BLDC GLUCOMTR-MCNC: 102 MG/DL — HIGH (ref 70–99)
GLUCOSE BLDC GLUCOMTR-MCNC: 115 MG/DL — HIGH (ref 70–99)
GLUCOSE BLDC GLUCOMTR-MCNC: 90 MG/DL — SIGNIFICANT CHANGE UP (ref 70–99)

## 2022-10-23 PROCEDURE — 99232 SBSQ HOSP IP/OBS MODERATE 35: CPT

## 2022-10-23 RX ORDER — KETOROLAC TROMETHAMINE 30 MG/ML
15 SYRINGE (ML) INJECTION EVERY 6 HOURS
Refills: 0 | Status: DISCONTINUED | OUTPATIENT
Start: 2022-10-23 | End: 2022-10-24

## 2022-10-23 RX ADMIN — Medication 15 MILLIGRAM(S): at 05:51

## 2022-10-23 RX ADMIN — PANTOPRAZOLE SODIUM 40 MILLIGRAM(S): 20 TABLET, DELAYED RELEASE ORAL at 05:52

## 2022-10-23 RX ADMIN — ENOXAPARIN SODIUM 40 MILLIGRAM(S): 100 INJECTION SUBCUTANEOUS at 05:53

## 2022-10-23 RX ADMIN — Medication 12.5 MILLIGRAM(S): at 05:52

## 2022-10-23 RX ADMIN — Medication 1000 MILLIGRAM(S): at 05:51

## 2022-10-23 RX ADMIN — LIDOCAINE 1 PATCH: 4 CREAM TOPICAL at 11:55

## 2022-10-23 RX ADMIN — Medication 1000 MILLIGRAM(S): at 11:58

## 2022-10-23 RX ADMIN — Medication 15 MILLIGRAM(S): at 11:57

## 2022-10-23 RX ADMIN — Medication 15 MILLIGRAM(S): at 08:37

## 2022-10-23 RX ADMIN — Medication 15 MILLIGRAM(S): at 13:16

## 2022-10-23 RX ADMIN — MAGNESIUM HYDROXIDE 30 MILLILITER(S): 400 TABLET, CHEWABLE ORAL at 11:57

## 2022-10-23 RX ADMIN — Medication 1000 MILLIGRAM(S): at 14:59

## 2022-10-23 RX ADMIN — LIDOCAINE 1 PATCH: 4 CREAM TOPICAL at 20:00

## 2022-10-23 RX ADMIN — Medication 1000 MILLIGRAM(S): at 07:00

## 2022-10-23 RX ADMIN — Medication 1000 MILLIGRAM(S): at 22:54

## 2022-10-23 RX ADMIN — Medication 1000 MILLIGRAM(S): at 22:27

## 2022-10-23 RX ADMIN — LIDOCAINE 1 PATCH: 4 CREAM TOPICAL at 02:01

## 2022-10-23 NOTE — PROGRESS NOTE ADULT - SUBJECTIVE AND OBJECTIVE BOX
Patient seen and examined this afternoon around 1.30 PM.     82 year old female, from home, ambulates with cane, [facility] HTN, HLD, DM, prior brain surgery (benign meningioma), prior cerebellar CVA, presents to the ED after fall at home. Pt states that 2 nights ago, she was walking to her bedroom it was dark and she fell. Pt was not able to get up on her own. She states she felt vertigo, "the room spinning" after she fell and is not sure if she lost consciousness or not. She states she has 6/10 pain in her right shoulder. Her neighbor called EMS the next day when she checked in on her. Pt found to be COVID+ in the ED. Also with Right shoulder fracture.     Patient was away to [facility] in August to September after Son passed away. She just returned at the end of September. She is visited by her grandkids usually. Marcia is NOK. She reported also stop taking Ozempic last week after she had Nausea and stomach upset but has been tolerating well for last few months.    Patient doing much better today; pain much better controlled.   denies SOB, palpitations, chest pain, nausea, vomiting, diarrhea, constipation, dizziness    MEDICATIONS  (STANDING):  acetaminophen     Tablet .. 1000 milliGRAM(s) Oral every 8 hours  atorvastatin 40 milliGRAM(s) Oral at bedtime  enoxaparin Injectable 40 milliGRAM(s) SubCutaneous every 12 hours  insulin lispro (ADMELOG) corrective regimen sliding scale   SubCutaneous three times a day before meals  insulin lispro (ADMELOG) corrective regimen sliding scale   SubCutaneous at bedtime  lidocaine   4% Patch 1 Patch Transdermal daily  metoprolol succinate ER 12.5 milliGRAM(s) Oral daily  pantoprazole    Tablet 40 milliGRAM(s) Oral before breakfast  senna 2 Tablet(s) Oral at bedtime  sodium chloride 0.9%. 1000 milliLiter(s) (75 mL/Hr) IV Continuous <Continuous>    MEDICATIONS  (PRN):  aluminum hydroxide/magnesium hydroxide/simethicone Suspension 30 milliLiter(s) Oral every 4 hours PRN Dyspepsia  ketorolac   Injectable 15 milliGRAM(s) IV Push every 6 hours PRN Moderate Pain (4 - 6)  magnesium hydroxide Suspension 30 milliLiter(s) Oral daily PRN Constipation  melatonin 3 milliGRAM(s) Oral at bedtime PRN Insomnia  morphine  - Injectable 2 milliGRAM(s) IV Push every 6 hours PRN Severe Pain (7 - 10)  ondansetron Injectable 4 milliGRAM(s) IV Push every 8 hours PRN Nausea and/or Vomiting  traMADol 50 milliGRAM(s) Oral every 6 hours PRN Moderate Pain (4 - 6)        Vital Signs Last 24 Hrs  T(C): 36.7 (22 Oct 2022 14:15), Max: 36.8 (21 Oct 2022 20:41)  T(F): 98 (22 Oct 2022 14:15), Max: 98.2 (21 Oct 2022 20:41)  HR: 77 (22 Oct 2022 14:15) (71 - 89)  BP: 124/60 (22 Oct 2022 14:15) (122/56 - 136/49)  RR: 18 (22 Oct 2022 14:15) (17 - 18)  SpO2: 98% (22 Oct 2022 14:15) (93% - 100%)  room air    P/E:  elderly female, comfortable at rest , NAD  Psych: AAO x3  Neuro: No gross focal deficits; Power and sensation intact  CVS: S1S2 present, regular, no edema  Resp: BLAE+, No wheeze or Rhonchi  GI: Soft, BS+, Non tender, non distended  Extr: No  calf tenderness B/L Lower extremities  Right shoulder swelling, tenderness and limited ROM due to fracture; right arm sling in place  Skin: Warm and moist without any rashes    Labs:stable 10/22; no new today                                  10.8   10.23 )-----------( 333      ( 22 Oct 2022 07:26 )             33.9   10-22    141  |  107  |  13  ----------------------------<  103<H>  4.1   |  27  |  0.49<L>    Ca    9.0      22 Oct 2022 07:26    TPro  6.0  /  Alb  2.3<L>  /  TBili  0.6  /  DBili  x   /  AST  18  /  ALT  18  /  AlkPhos  55  10-22  A1C with Estimated Average Glucose (10.19.22 @ 10:12) A1C with Estimated Average Glucose Result: 6.1:  Ferritin, Serum in AM (10.21.22 @ 06:24) Ferritin, Serum: 359 ng/mL   Lactate Dehydrogenase, Serum in AM (10.21.22 @ 06:24) Lactate Dehydrogenase, Serum: 181 U/L   D-Dimer Assay, Quantitative (10.21.22 @ 06:24) D-Dimer Assay, Quantitative: 753:   D-Dimer Assay, Quantitative (10.19.22 @ 07:16) D-Dimer Assay, Quantitative: 1258    CAPILLARY BLOOD GLUCOSE      POCT Blood Glucose.: 115 mg/dL (23 Oct 2022 16:42)  POCT Blood Glucose.: 90 mg/dL (23 Oct 2022 12:04)  POCT Blood Glucose.: 102 mg/dL (23 Oct 2022 08:11)  POCT Blood Glucose.: 105 mg/dL (22 Oct 2022 21:15)        ACC: [id number] EXAM:  CT BRAIN                        ACC: [id number] EXAM:  CT CERVICAL SPINE                        PROCEDURE DATE:  10/19/2022    IMPRESSION:  1. No acute intracranial CT abnormality.  2. No evidence of acute cervical spine fracture or traumatic malalignment, within the limitations of motion artifact.  3. Hemorrhage in the right axillary region, secondary to displaced proximal humeral fracture demonstrated on the shoulder radiographs.    Xray Chest 1 View AP/PA (10.19.22 @ 04:48)  IMPRESSION: No acute infiltrate. Displaced angulated right humeral neck fracture with overlap.     Patient seen and examined this afternoon around 1.30 PM.     82 year old female, from home, ambulates with cane, [facility] HTN, HLD, DM, prior brain surgery (benign meningioma), prior cerebellar CVA, presents to the ED after fall at home. Pt reported  2 nights ago, she was walking to her bedroom it was dark and she fell. Pt was not able to get up on her own. She states she felt vertigo, "the room spinning" after she fell and is not sure if she lost consciousness or not. She states she has 6/10 pain in her right shoulder. Her neighbor called EMS the next day when she checked in on her. Pt found to be COVID+ in the ED. Also with Right shoulder fracture. Patient was away to [facility] in August to September after Son passed away. She just returned at the end of September. She is visited by her grandkids usually. Marcia is NOK. She reported also stop taking Ozempic last week after she had Nausea and stomach upset but has been tolerating well for last few months.    Patient doing much better; pain much better controlled.   denies SOB, palpitations, chest pain, nausea, vomiting, diarrhea, constipation, dizziness    MEDICATIONS  (STANDING):  acetaminophen     Tablet .. 1000 milliGRAM(s) Oral every 8 hours  atorvastatin 40 milliGRAM(s) Oral at bedtime  enoxaparin Injectable 40 milliGRAM(s) SubCutaneous every 12 hours  insulin lispro (ADMELOG) corrective regimen sliding scale   SubCutaneous three times a day before meals  insulin lispro (ADMELOG) corrective regimen sliding scale   SubCutaneous at bedtime  lidocaine   4% Patch 1 Patch Transdermal daily  metoprolol succinate ER 12.5 milliGRAM(s) Oral daily  pantoprazole    Tablet 40 milliGRAM(s) Oral before breakfast  senna 2 Tablet(s) Oral at bedtime  sodium chloride 0.9%. 1000 milliLiter(s) (75 mL/Hr) IV Continuous <Continuous>    MEDICATIONS  (PRN):  aluminum hydroxide/magnesium hydroxide/simethicone Suspension 30 milliLiter(s) Oral every 4 hours PRN Dyspepsia  ketorolac   Injectable 15 milliGRAM(s) IV Push every 6 hours PRN Moderate Pain (4 - 6)  magnesium hydroxide Suspension 30 milliLiter(s) Oral daily PRN Constipation  melatonin 3 milliGRAM(s) Oral at bedtime PRN Insomnia  morphine  - Injectable 2 milliGRAM(s) IV Push every 6 hours PRN Severe Pain (7 - 10)  ondansetron Injectable 4 milliGRAM(s) IV Push every 8 hours PRN Nausea and/or Vomiting  traMADol 50 milliGRAM(s) Oral every 6 hours PRN Moderate Pain (4 - 6)    Vital Signs Last 24 Hrs  T(C): 36.4 (23 Oct 2022 12:10), Max: 37.1 (23 Oct 2022 05:57)  T(F): 97.5 (23 Oct 2022 12:10), Max: 98.7 (23 Oct 2022 05:57)  HR: 75 (23 Oct 2022 12:10) (75 - 84)  BP: 128/55 (23 Oct 2022 12:10) (110/53 - 128/55)  RR: 18 (23 Oct 2022 12:10) (18 - 18)  SpO2: 97% (23 Oct 2022 12:10) (94% - 97%) room air    P/E:  elderly female, comfortable at rest , NAD  Psych: AAO x3  Neuro: No gross focal deficits; Power and sensation intact  CVS: S1S2 present, regular, no edema  Resp: BLAE+, No wheeze or Rhonchi  GI: Soft, BS+, Non tender, non distended  Extr: No  calf tenderness B/L Lower extremities  Right shoulder swelling, tenderness improved and limited ROM due to fracture; right arm sling in place  Skin: Warm and moist without any rashes    Labs:stable 10/22; no new today                                  10.8   10.23 )-----------( 333      ( 22 Oct 2022 07:26 )             33.9   10-22    141  |  107  |  13  ----------------------------<  103<H>  4.1   |  27  |  0.49<L>    Ca    9.0      22 Oct 2022 07:26    TPro  6.0  /  Alb  2.3<L>  /  TBili  0.6  /  DBili  x   /  AST  18  /  ALT  18  /  AlkPhos  55  10-22  A1C with Estimated Average Glucose (10.19.22 @ 10:12) A1C with Estimated Average Glucose Result: 6.1:  Ferritin, Serum in AM (10.21.22 @ 06:24) Ferritin, Serum: 359 ng/mL   Lactate Dehydrogenase, Serum in AM (10.21.22 @ 06:24) Lactate Dehydrogenase, Serum: 181 U/L   D-Dimer Assay, Quantitative (10.21.22 @ 06:24) D-Dimer Assay, Quantitative: 753:   D-Dimer Assay, Quantitative (10.19.22 @ 07:16) D-Dimer Assay, Quantitative: 1258    CAPILLARY BLOOD GLUCOSE  POCT Blood Glucose.: 115 mg/dL (23 Oct 2022 16:42)  POCT Blood Glucose.: 90 mg/dL (23 Oct 2022 12:04)  POCT Blood Glucose.: 102 mg/dL (23 Oct 2022 08:11)  POCT Blood Glucose.: 105 mg/dL (22 Oct 2022 21:15)        ACC: [id number] EXAM:  CT BRAIN                        ACC: [id number] EXAM:  CT CERVICAL SPINE                        PROCEDURE DATE:  10/19/2022    IMPRESSION:  1. No acute intracranial CT abnormality.  2. No evidence of acute cervical spine fracture or traumatic malalignment, within the limitations of motion artifact.  3. Hemorrhage in the right axillary region, secondary to displaced proximal humeral fracture demonstrated on the shoulder radiographs.    Xray Chest 1 View AP/PA (10.19.22 @ 04:48)  IMPRESSION: No acute infiltrate. Displaced angulated right humeral neck fracture with overlap.

## 2022-10-23 NOTE — PROGRESS NOTE ADULT - ASSESSMENT
D/D:  s/p Mechanical fall likely due to dehydration with volume depletion due to   COVID 19 infection plus age related physical debility likely  Right shoulder humeral neck fracture s/p fall conservative mgmt as per Ortho  Elevated TSH likely Sick Euthyroid state  Type 2 DM controlled  HTN controlled    Plan:   Patient appear Euvolemic now; eating better; may D/C IV fluid  Orthopedic consult recommended conservative mgmt; right arm sling in place  Pain well controlled with current regimen; Pain mgt follow up  Change Toradol to PRN AM; continue Tylenol 1gm q 8 hrs for 1-2 days more  Continue Lidocaine patch; Use Tramadol PRN for moderate pain and Toradol for severe pain  Continuer Metoprolol ER 12.5 mg daily  PT eval recommend IRIS; Patient in agreement; follow up with Case mgmt. Granddaughter assisting patient with choices  repeat COVID swab Monday  ISS for now; May resume Ozempic as outpatient if sugars morethan 200.     May resume Metformin if sugars elevated persistently above 150; still 120 to 150 range. A1c 6.1 suggesting tight control in an elderly.   DVT ppx with Lovenox  BID as D-dimer was elevated >1000 but trending down  Repeat inflammatory markers Monday. Hold all labs tomorrow  Follow up TSH also on Monday.     Discussed with patient findings and plan of care  OOB to chair 82 Female admitted s/p Mechanical fall noted to be COVID positive and Right shoulder fracture    D/D:  s/p Mechanical fall likely due to dehydration with volume depletion due to   COVID 19 infection plus age related physical debility likely  Right shoulder humeral neck fracture s/p fall conservative mgmt as per Ortho  Elevated TSH likely Sick Euthyroid state  Type 2 DM controlled  HTN controlled    Plan:   Patient doing well;  eating better; may hold off D/C IV fluid  Orthopedic consult recommended conservative mgmt; right arm sling in place  Pain well controlled with current regimen; Pain mgt follow up  Changed Toradol to PRN for modertae pain; continue Tylenol 1gm q 8 hrs for 1-2 days more and then switch to PRN  Continue Lidocaine patch; May give Morphine for severe pain  Continuer Metoprolol ER 12.5 mg daily  PT eval recommend IRIS; Patient in agreement; follow up with Case mgmt. Granddaughter assisting patient with choices  As per patient her friend reported Haydee Mcfarland may be agood yeseniaice. Her Granddaughter may visit the facility tomorrow  Left a message for Marcia with updated clinical status  repeat COVID swab Monday  ISS for now; May resume Ozempic as outpatient if sugars more than 200.    May resume Metformin if sugars elevated persistently above 150; still 120 to 150 range. A1c 6.1 suggesting tight control in an elderly.   DVT ppx with Lovenox  BID as D-dimer was elevated >1000 but trending down  Repeat inflammatory markers Monday  Follow up TSH also on Monday.   COVID swab Monday morning     Discussed with patient findings and plan of care  OOB to chair

## 2022-10-24 DIAGNOSIS — Z02.9 ENCOUNTER FOR ADMINISTRATIVE EXAMINATIONS, UNSPECIFIED: ICD-10-CM

## 2022-10-24 LAB
CRP SERPL-MCNC: 78 MG/L — HIGH
D DIMER BLD IA.RAPID-MCNC: 1353 NG/ML DDU — HIGH
FERRITIN SERPL-MCNC: 294 NG/ML — HIGH (ref 15–150)
GLUCOSE BLDC GLUCOMTR-MCNC: 114 MG/DL — HIGH (ref 70–99)
GLUCOSE BLDC GLUCOMTR-MCNC: 125 MG/DL — HIGH (ref 70–99)
GLUCOSE BLDC GLUCOMTR-MCNC: 140 MG/DL — HIGH (ref 70–99)
GLUCOSE BLDC GLUCOMTR-MCNC: 163 MG/DL — HIGH (ref 70–99)
SARS-COV-2 RNA SPEC QL NAA+PROBE: DETECTED

## 2022-10-24 PROCEDURE — 99232 SBSQ HOSP IP/OBS MODERATE 35: CPT

## 2022-10-24 RX ORDER — GABAPENTIN 400 MG/1
100 CAPSULE ORAL
Refills: 0 | Status: DISCONTINUED | OUTPATIENT
Start: 2022-10-24 | End: 2022-10-31

## 2022-10-24 RX ORDER — OXYCODONE HYDROCHLORIDE 5 MG/1
2.5 TABLET ORAL EVERY 4 HOURS
Refills: 0 | Status: DISCONTINUED | OUTPATIENT
Start: 2022-10-24 | End: 2022-10-24

## 2022-10-24 RX ORDER — LACTULOSE 10 G/15ML
10 SOLUTION ORAL ONCE
Refills: 0 | Status: COMPLETED | OUTPATIENT
Start: 2022-10-24 | End: 2022-10-24

## 2022-10-24 RX ORDER — ACETAMINOPHEN 500 MG
1000 TABLET ORAL EVERY 8 HOURS
Refills: 0 | Status: COMPLETED | OUTPATIENT
Start: 2022-10-24 | End: 2022-10-27

## 2022-10-24 RX ADMIN — OXYCODONE HYDROCHLORIDE 2.5 MILLIGRAM(S): 5 TABLET ORAL at 18:02

## 2022-10-24 RX ADMIN — OXYCODONE HYDROCHLORIDE 2.5 MILLIGRAM(S): 5 TABLET ORAL at 10:41

## 2022-10-24 RX ADMIN — SENNA PLUS 2 TABLET(S): 8.6 TABLET ORAL at 23:10

## 2022-10-24 RX ADMIN — LIDOCAINE 1 PATCH: 4 CREAM TOPICAL at 00:10

## 2022-10-24 RX ADMIN — OXYCODONE HYDROCHLORIDE 2.5 MILLIGRAM(S): 5 TABLET ORAL at 13:40

## 2022-10-24 RX ADMIN — GABAPENTIN 100 MILLIGRAM(S): 400 CAPSULE ORAL at 10:41

## 2022-10-24 RX ADMIN — OXYCODONE HYDROCHLORIDE 2.5 MILLIGRAM(S): 5 TABLET ORAL at 18:32

## 2022-10-24 RX ADMIN — Medication 1000 MILLIGRAM(S): at 06:03

## 2022-10-24 RX ADMIN — Medication 1000 MILLIGRAM(S): at 17:12

## 2022-10-24 RX ADMIN — ENOXAPARIN SODIUM 40 MILLIGRAM(S): 100 INJECTION SUBCUTANEOUS at 10:52

## 2022-10-24 RX ADMIN — Medication 375 MILLIGRAM(S): at 12:23

## 2022-10-24 RX ADMIN — Medication 375 MILLIGRAM(S): at 13:40

## 2022-10-24 RX ADMIN — LIDOCAINE 1 PATCH: 4 CREAM TOPICAL at 18:23

## 2022-10-24 RX ADMIN — Medication 1000 MILLIGRAM(S): at 23:10

## 2022-10-24 RX ADMIN — LIDOCAINE 1 PATCH: 4 CREAM TOPICAL at 10:41

## 2022-10-24 RX ADMIN — Medication 1000 MILLIGRAM(S): at 13:23

## 2022-10-24 RX ADMIN — Medication 12.5 MILLIGRAM(S): at 06:04

## 2022-10-24 RX ADMIN — Medication 375 MILLIGRAM(S): at 20:15

## 2022-10-24 RX ADMIN — Medication 1000 MILLIGRAM(S): at 06:59

## 2022-10-24 RX ADMIN — PANTOPRAZOLE SODIUM 40 MILLIGRAM(S): 20 TABLET, DELAYED RELEASE ORAL at 06:04

## 2022-10-24 RX ADMIN — LIDOCAINE 1 PATCH: 4 CREAM TOPICAL at 23:00

## 2022-10-24 RX ADMIN — GABAPENTIN 100 MILLIGRAM(S): 400 CAPSULE ORAL at 18:02

## 2022-10-24 RX ADMIN — LACTULOSE 10 GRAM(S): 10 SOLUTION ORAL at 12:23

## 2022-10-24 NOTE — PROGRESS NOTE ADULT - PROBLEM SELECTOR PLAN 1
-s/p fall at home   -CT showed displaced proximal humeral fracture  -Ortho following- conservative management recommended   - Start oxycodone 2.5 mg PO q 4 hours PRN for severe pain  - Naproxen 375mg PO q8h x 3 days, with PPI.  - Renew Acetaminophen 1 gram PO q 8 hours for 3 days. Monitor LFTs  - Continue Lidoderm 4% patch daily.   - Gabapentin 100mg PO BID.   -PT reccs: IRIS   -pain management following

## 2022-10-24 NOTE — PROGRESS NOTE ADULT - SUBJECTIVE AND OBJECTIVE BOX
Source of information: MARTELL GEE, Chart review  Patient language: English  : n/a    HPI:  82 year old female, from home, ambulates with cane, PMH HTN, HLD, DM, prior brain surgery (benign meningioma), prior cerebellar CVA, presents to the ED after fall at home. Pt states that 2 nights ago, she was walking to her bedroom and felt as if the distance was further. She states it was dark and she fell. Pt was not able to get up on her own. She states she felt vertigo, "the room spinning" after she fell and is not sure if she lost consciousness or not. She states she has 6/10 pain in her right shoulder. Her neighbor called EMS the next day when she checked in on her. Pt states that she recently traveled back from [facility] a couple weeks ago. She also recently stopped taking Ozempic 1 week ago due to symptoms of nausea and diarrhea which she attributed to the medication. Pt found to be COVID+ in the ED. She states she has post-nasal drip and minor chills but otherwise denies headache, fever, cough, shortness of breath, sore throat, chest pain, palpitations, abdominal pain, diarrhea, constipation or dysuria.    In ED: vitals /69, HR 96, Temp 98.1F, 98% on RA  CT Head and Cervical Spine shows no acute intracranial CT abnormality. No evidence of acute cervical spine fracture or traumatic malalignment, within the limitations of motion artifact. Hemorrhage in the right axillary region, secondary to displaced proximal humeral fracture demonstrated on the shoulder radiographs.  s/p 500cc IVF NS bolus in ED (19 Oct 2022 09:24)      Pt is admitted for s/p Fall. Pain consulted for Rt shoulder/arm pain. Pt with displaced proximal humeral fracture showed on CT. Pt seen and examined at bedside. + covid 10/19. Airborne/ contact precautions maintained. Pt sitting in chair, reports right shoulder pain score 7-8/10 and not tolerable SCALE USED: (1-10 VNRS). RN to administer PRN pain meds. Pt describes pain as sharp, radiating from R shoulder to R arm,  alleviated by pain medication and exacerbated by movement. R arm sling in place. Pt tolerating PO diet. Denies lethargy, nausea, vomiting, itchiness. Reports constipation, last BM 10/18, and hx of occasionally constipation. Patient stated goal for pain control: to be able to take deep breaths, get out of bed to chair and ambulate with tolerable pain control. Pt reports taking Tylenol 500 mg po for mild pain and headaches at home as needed. Patient stating living alone at home, baseline uses cane for walking and when outside uses walker. Patient seen by Ortho team, will treat Fx conservatively. Plan to be discharged to [facility].     PAST MEDICAL & SURGICAL HISTORY:  Brain Tumor (Benign)    Nephrolithiasis    Headache  undiagnosed for years- exacerbation treated with Prednisone    Hyperlipidemia    Diabetes    Ischemic stroke    HTN (hypertension)    DM (diabetes mellitus)    HLD (hyperlipidemia)    Brain Tumor (Benign)    H/O brain surgery  benign    FAMILY HISTORY:  Family history of cardiomyopathy    FH: heart attack (Father)    FH: heart disease (Child)    Social History:  Pt lives at home alone, no HHA. Denies tobacco, alcohol or illicit drug use. (19 Oct 2022 09:24)   [x] Denies ETOH use, illicit drug use and smoking    Allergies    penicillin (Unknown)- rash    MEDICATIONS  (STANDING):  acetaminophen     Tablet .. 1000 milliGRAM(s) Oral every 8 hours  atorvastatin 40 milliGRAM(s) Oral at bedtime  enoxaparin Injectable 40 milliGRAM(s) SubCutaneous every 12 hours  gabapentin 100 milliGRAM(s) Oral two times a day  insulin lispro (ADMELOG) corrective regimen sliding scale   SubCutaneous three times a day before meals  insulin lispro (ADMELOG) corrective regimen sliding scale   SubCutaneous at bedtime  lidocaine   4% Patch 1 Patch Transdermal daily  metoprolol succinate ER 12.5 milliGRAM(s) Oral daily  naproxen 375 milliGRAM(s) Oral every 8 hours  pantoprazole    Tablet 40 milliGRAM(s) Oral before breakfast  senna 2 Tablet(s) Oral at bedtime  sodium chloride 0.9%. 1000 milliLiter(s) (75 mL/Hr) IV Continuous <Continuous>    MEDICATIONS  (PRN):  aluminum hydroxide/magnesium hydroxide/simethicone Suspension 30 milliLiter(s) Oral every 4 hours PRN Dyspepsia  magnesium hydroxide Suspension 30 milliLiter(s) Oral daily PRN Constipation  melatonin 3 milliGRAM(s) Oral at bedtime PRN Insomnia  ondansetron Injectable 4 milliGRAM(s) IV Push every 8 hours PRN Nausea and/or Vomiting  oxyCODONE    IR 2.5 milliGRAM(s) Oral every 4 hours PRN Severe Pain (7 - 10)      Vital Signs Last 24 Hrs  T(C): 36.5 (24 Oct 2022 05:58), Max: 36.5 (24 Oct 2022 05:58)  T(F): 97.7 (24 Oct 2022 05:58), Max: 97.7 (24 Oct 2022 05:58)  HR: 85 (24 Oct 2022 05:58) (85 - 85)  BP: 139/85 (24 Oct 2022 05:58) (139/85 - 139/85)  BP(mean): --  RR: 18 (24 Oct 2022 05:58) (18 - 18)  SpO2: 96% (24 Oct 2022 05:58) (96% - 96%)    Parameters below as of 24 Oct 2022 05:58  Patient On (Oxygen Delivery Method): room air      COVID-19 PCR: Detected (19 Oct 2022 00:13)    LABS: Reviewed    CAPILLARY BLOOD GLUCOSE      POCT Blood Glucose.: 163 mg/dL (24 Oct 2022 11:22)  POCT Blood Glucose.: 114 mg/dL (24 Oct 2022 08:07)  POCT Blood Glucose.: 115 mg/dL (23 Oct 2022 16:42)    COVID-19 PCR: Detected (19 Oct 2022 00:13)      Radiology: Reviewed.   < from: Xray Humerus, Right (10.19.22 @ 04:48) >    ACC: [id number] EXAM:  XR HUMERUS MIN 2 VIEWS RT                          PROCEDURE DATE:  10/19/2022          INTERPRETATION:  Right humerus    HISTORY: Shoulder fracture     Two views of the right humerus show no evidence of fracture nor   destructive change below the surgical neck. The distal joint spaces are   maintained.    IMPRESSION: No acute bony pathology distally.        Thank you for this referral.    --- End of Report ---            YOLETTE MALIN MD; Attending Interventional Radiologist  This document has been electronically signed. Oct 21 2022  9:24AM    < end of copied text >    < from: Xray Shoulder 2 Views, Right (10.19.22 @ 04:48) >    ACC: [id number] EXAM:  XR SHOULDER COMP MIN 2V RT                          PROCEDURE DATE:  10/19/2022          INTERPRETATION:  Right shoulder    HISTORY: Patient fell     Three views of the right shoulder show angulated and displaced   comminuted fracture of the surgical neck. The joint spaces are maintained.    IMPRESSION: Surgical neck fracture.    The above findings correspond to a note entered into the hospital's image   software system by the emergency department staff at the time of the   study.     Thank you for this referral.    --- End of Report ---            YOLETTE MALIN MD; Attending Interventional Radiologist  This document has been electronically signed. Oct 21 2022  9:23AM    < end of copied text >      ACC: [id number] EXAM:  CT BRAIN                        ACC: [id number] EXAM:  CT CERVICAL SPINE                          PROCEDURE DATE:  10/19/2022      INTERPRETATION:  EXAMINATION: CT CERVICAL SPINE, CT HEAD    DATE: 10/19/2022 6:30 AM    INDICATION: Trauma    COMPARISON: None available    TECHNIQUE: Thin section noncontrast axial images were obtained through   the head and cervical spine. Multiplanar reformats submitted.    FINDINGS:    CT HEAD: No acute intracranial hemorrhage or suspicious extra-axial fluid   collection. Left frontal encephalomalacia with overlying craniectomy. No   focal edema or acute mass effect. No hydrocephalus or central herniation.   Moderate cerebral atrophy and mild chronic microvascular ischemic change.    Minimal paranasal sinus mucosal disease. Minimal opacification of the   mastoids, more prominent on the left. Scalp and image midfacial soft   tissues are unremarkable.    CT cervical spine:  The imaged skull base and craniovertebral alignment are intact. Vertebral   body heights are maintained. No definite listhesis, although motion   artifact partially degrades evaluation. Within the limitations of motion,   no acute fracture.    Degenerative disc disease most prominent at C5-6 where there is narrowing   of the central canal to 7 mm and moderate right foraminal stenosis.    Imaged lung apices are clear. Hemorrhage in the right axillary region. No   prevertebral edema.    IMPRESSION:  1. No acute intracranial CT abnormality.  2. No evidence ofacute cervical spine fracture or traumatic   malalignment, within the limitations of motion artifact.  3. Hemorrhage in the right axillary region, secondary to displaced   proximal humeral fracture demonstrated on the shoulder radiographs.    --- Endof Report ---    CRISTOBAL LEON MD; Attending Radiologist  This document has been electronically signed. Oct 19 2022  7:05AM  [facility]: [id number] EXAM:  XR HUMERUS MIN 2 VIEWS RT                          PROCEDURE DATE:  10/19/2022      INTERPRETATION:  Right humerus    HISTORY: Shoulder fracture     Two views of the right humerus show no evidence of fracture nor   destructive change below the surgical neck. The distal joint spaces are   maintained.    IMPRESSION: No acute bony pathology distally.    Thank you for this referral.    --- End of Report ---    YOLETTE MALIN MD; Attending Interventional Radiologist  This document has been electronically signed. Oct 21 2022  9:24AM  CC: [id number] EXAM:  XR SHOULDER COMP MIN 2V RT                          PROCEDURE DATE:  10/19/2022      INTERPRETATION:  Right shoulder    HISTORY: Patient fell     Three views of the right shoulder show angulated and displaced   comminuted fracture of the surgical neck. The joint spaces are maintained.    IMPRESSION: Surgical neck fracture.    The above findings correspond to a note entered into the hospital's image   software system by the emergency department staff at the time of the   study.     Thank you for this referral.    --- End of Report ---    YOLETTE MALIN MD; Attending Interventional Radiologist  This document has been electronically signed. Oct 21 2022  9:23AM    ORT Score -   Family Hx of substance abuse	Female	      Male  Alcohol 	                                           1                     3  Illegal drugs	                                   2                     3  Rx drugs                                           4 	                  4  Personal Hx of substance abuse		  Alcohol 	                                          3	                  3  Illegal drugs                                     4	                  4  Rx drugs                                            5 	                  5  Age between 16- 45 years	           1                     1  hx preadolescent sexual abuse	   3 	                  0  Psychological disease		  ADD, OCD, bipolar, schizophrenia   2	          2  Depression                                           1 	          1  Total: 0    a score of 3 or lower indicates low risk for opioid abuse		  a score of 4-7 indicates moderate risk for opioid abuse		  a score of 8 or higher indicates high risk for opioid abuse  	  REVIEW OF SYSTEMS:  CONSTITUTIONAL: No fever or fatigue  RESPIRATORY: No cough, wheezing, chills or hemoptysis; No shortness of breath  CARDIOVASCULAR: No chest pain, palpitations, dizziness, or leg swelling  GASTROINTESTINAL: + loss of appetite, + decreased PO intake. No abdominal or epigastric pain. No nausea, vomiting; No diarrhea, + constipation.   GENITOURINARY: No dysuria, frequency, hematuria, retention or incontinence  MUSCULOSKELETAL: + R shoulder/arm joint pain No swelling; + decreased ROM right arm d/t fx; no upper or lower motor strength weakness, no saddle anesthesia, bowel/bladder incontinence, + hx falls   NEURO: No headaches, No numbness/tingling b/l LE    PHYSICAL EXAM:  GENERAL:  Alert & Oriented X4, cooperative, NAD. Speech is clear.   RESPIRATORY: Respirations even and unlabored. Clear to auscultation bilaterally; No rales, rhonchi, wheezing, or rubs  CARDIOVASCULAR: Normal S1/S2, regular rate and rhythm; No murmurs, rubs, or gallops. No JVD.   GASTROINTESTINAL:  Soft, Nontender, Nondistended; Bowel sounds present  PERIPHERAL VASCULAR:  Extremities warm without edema. 2+ Peripheral Pulses, No cyanosis, No calf tenderness  MUSCULOSKELETAL: Motor Strength 4/5 B/L upper and 5/5 lower extremities; + ROM decreased RUE; negative SLR; + tenderness on palpation of R shoulder and upper arm + right shoulder in sling   SKIN: Warm, dry, intact. No rashes, lesions, scars or wounds. R upper shoulder/arm with +ecchymosis. R arm sling in place.    Risk factors associated with adverse outcomes related to opioid treatment  [ ]  Concurrent benzodiazepine use  [ ]  History/ Active substance use or alcohol use disorder  [ ] Psychiatric co-morbidity  [ ] Sleep apnea  [ ] COPD  [ ] BMI> 35  [ ] Liver dysfunction  [ ] Renal dysfunction  [ ] CHF  [ ] Smoker  [x]  Age > 60 years    [x)  [place]  Reviewed and Copied to Chart. See below.    Plan of care and goal oriented pain management treatment options were discussed with patient and /or primary care giver; all questions and concerns were addressed and care was aligned with patient's wishes.    Educated patient on goal oriented pain management treatment options     10-24-22 @ 13:31

## 2022-10-24 NOTE — PROGRESS NOTE ADULT - PROBLEM SELECTOR PLAN 1
Pt with pain which is somatic in nature due to displaced proximal humeral fracture. Patient seen by Ortho team, treat Fx conservatively. High risk medications reviewed. Avoid polypharmacy. Avoid IV opioids. Avoid NSAIDs and benzodiazepines. Non-pharmacological sleep aides initiated. Non-opioid medications and non-pharmacological pain management measures initiated.  Opioid pain recommendations   - Discontinue Morphine 2 mg IV q6h prn for severe pain.   - Discontinue Tramadol 50mg PO q 6 hours PRN moderate pain. Monitor for sedation/ respiratory depression.   - Start oxycodone 2.5 mg PO q 4 hours PRN for severe pain  Non-opioid pain recommendations   - Discontinue Toradol 15mg IVP q 6 hours severe pain.  - Naproxen 375mg PO q8h x 3 days, with PPI. Discussed with MD Mayorga  - Renew Acetaminophen 1 gram PO q 8 hours for 3 days. Monitor LFTs  - Continue Lidoderm 4% patch daily.   - Gabapentin 100mg PO BID.   Bowel Regimen  - Continue Miralax 17G PO daily  - Continue Senna 2 tablets at bedtime for constipation  - lactulose 10G PO once now.

## 2022-10-24 NOTE — PROGRESS NOTE ADULT - SUBJECTIVE AND OBJECTIVE BOX
NP Note discussed with  Primary Attending    Patient is a 82y old  Female who presents with a chief complaint of fall, right humerus fracture (24 Oct 2022 13:31)      INTERVAL HPI/OVERNIGHT EVENTS: no new complaints    MEDICATIONS  (STANDING):  acetaminophen     Tablet .. 1000 milliGRAM(s) Oral every 8 hours  atorvastatin 40 milliGRAM(s) Oral at bedtime  enoxaparin Injectable 40 milliGRAM(s) SubCutaneous every 12 hours  gabapentin 100 milliGRAM(s) Oral two times a day  insulin lispro (ADMELOG) corrective regimen sliding scale   SubCutaneous three times a day before meals  insulin lispro (ADMELOG) corrective regimen sliding scale   SubCutaneous at bedtime  lidocaine   4% Patch 1 Patch Transdermal daily  metoprolol succinate ER 12.5 milliGRAM(s) Oral daily  naproxen 375 milliGRAM(s) Oral every 8 hours  pantoprazole    Tablet 40 milliGRAM(s) Oral before breakfast  senna 2 Tablet(s) Oral at bedtime  sodium chloride 0.9%. 1000 milliLiter(s) (75 mL/Hr) IV Continuous <Continuous>    MEDICATIONS  (PRN):  aluminum hydroxide/magnesium hydroxide/simethicone Suspension 30 milliLiter(s) Oral every 4 hours PRN Dyspepsia  magnesium hydroxide Suspension 30 milliLiter(s) Oral daily PRN Constipation  melatonin 3 milliGRAM(s) Oral at bedtime PRN Insomnia  ondansetron Injectable 4 milliGRAM(s) IV Push every 8 hours PRN Nausea and/or Vomiting  oxyCODONE    IR 2.5 milliGRAM(s) Oral every 4 hours PRN Severe Pain (7 - 10)      __________________________________________________  REVIEW OF SYSTEMS:    CONSTITUTIONAL: No fever,   EYES: no acute visual disturbances  NECK: No pain or stiffness  RESPIRATORY: No cough; No shortness of breath  CARDIOVASCULAR: No chest pain, no palpitations  GASTROINTESTINAL: No pain. No nausea or vomiting; No diarrhea   NEUROLOGICAL: No headache or numbness, no tremors  MUSCULOSKELETAL: No joint pain, no muscle pain  GENITOURINARY: no dysuria, no frequency, no hesitancy  PSYCHIATRY: no depression , no anxiety  ALL OTHER  ROS negative        Vital Signs Last 24 Hrs  T(C): 36.4 (24 Oct 2022 13:00), Max: 36.5 (24 Oct 2022 05:58)  T(F): 97.5 (24 Oct 2022 13:00), Max: 97.7 (24 Oct 2022 05:58)  HR: 85 (24 Oct 2022 13:18) (85 - 85)  BP: 117/51 (24 Oct 2022 13:18) (117/51 - 139/85)  BP(mean): --  RR: 18 (24 Oct 2022 13:00) (18 - 18)  SpO2: 96% (24 Oct 2022 13:18) (95% - 96%)    Parameters below as of 24 Oct 2022 13:18  Patient On (Oxygen Delivery Method): room air        ________________________________________________  PHYSICAL EXAM:  GENERAL: NAD  HEENT: Normocephalic;  conjunctivae and sclerae clear; moist mucous membranes;   NECK : supple  CHEST/LUNG: Clear to auscultation bilaterally with good air entry   HEART: S1 S2  regular; no murmurs, gallops or rubs  ABDOMEN: Soft, Nontender, Nondistended; Bowel sounds present  EXTREMITIES: no cyanosis; no edema; no calf tenderness  SKIN: warm and dry; no rash  NERVOUS SYSTEM:  Awake and alert; Oriented  to place, person and time ; no new deficits    _________________________________________________  LABS:              CAPILLARY BLOOD GLUCOSE      POCT Blood Glucose.: 163 mg/dL (24 Oct 2022 11:22)  POCT Blood Glucose.: 114 mg/dL (24 Oct 2022 08:07)  POCT Blood Glucose.: 115 mg/dL (23 Oct 2022 16:42)        RADIOLOGY & ADDITIONAL TESTS:    Imaging  Reviewed:  YES/NO    Consultant(s) Notes Reviewed:   YES/ No      Plan of care was discussed with patient and /or primary care giver; all questions and concerns were addressed  NP Note discussed with  Primary Attending    Patient is a 82y old  Female who presents with a chief complaint of fall, right humerus fracture (24 Oct 2022 13:31)      INTERVAL HPI/OVERNIGHT EVENTS: no new complaints    MEDICATIONS  (STANDING):  acetaminophen     Tablet .. 1000 milliGRAM(s) Oral every 8 hours  atorvastatin 40 milliGRAM(s) Oral at bedtime  enoxaparin Injectable 40 milliGRAM(s) SubCutaneous every 12 hours  gabapentin 100 milliGRAM(s) Oral two times a day  insulin lispro (ADMELOG) corrective regimen sliding scale   SubCutaneous three times a day before meals  insulin lispro (ADMELOG) corrective regimen sliding scale   SubCutaneous at bedtime  lidocaine   4% Patch 1 Patch Transdermal daily  metoprolol succinate ER 12.5 milliGRAM(s) Oral daily  naproxen 375 milliGRAM(s) Oral every 8 hours  pantoprazole    Tablet 40 milliGRAM(s) Oral before breakfast  senna 2 Tablet(s) Oral at bedtime  sodium chloride 0.9%. 1000 milliLiter(s) (75 mL/Hr) IV Continuous <Continuous>    MEDICATIONS  (PRN):  aluminum hydroxide/magnesium hydroxide/simethicone Suspension 30 milliLiter(s) Oral every 4 hours PRN Dyspepsia  magnesium hydroxide Suspension 30 milliLiter(s) Oral daily PRN Constipation  melatonin 3 milliGRAM(s) Oral at bedtime PRN Insomnia  ondansetron Injectable 4 milliGRAM(s) IV Push every 8 hours PRN Nausea and/or Vomiting  oxyCODONE    IR 2.5 milliGRAM(s) Oral every 4 hours PRN Severe Pain (7 - 10)      __________________________________________________  REVIEW OF SYSTEMS:    CONSTITUTIONAL: No fever,   EYES: no acute visual disturbances  NECK: No pain or stiffness  RESPIRATORY: No cough; No shortness of breath  CARDIOVASCULAR: No chest pain, no palpitations  GASTROINTESTINAL: No pain. No nausea or vomiting; No diarrhea   NEUROLOGICAL: No headache or numbness, no tremors  MUSCULOSKELETAL: +pain to right shoulder/arm with decreased ROM.  + R shoulder/arm joint pain. No swelling;    GENITOURINARY: no dysuria, no frequency, no hesitancy  PSYCHIATRY: no depression , no anxiety  ALL OTHER  ROS negative        Vital Signs Last 24 Hrs  T(C): 36.4 (24 Oct 2022 13:00), Max: 36.5 (24 Oct 2022 05:58)  T(F): 97.5 (24 Oct 2022 13:00), Max: 97.7 (24 Oct 2022 05:58)  HR: 85 (24 Oct 2022 13:18) (85 - 85)  BP: 117/51 (24 Oct 2022 13:18) (117/51 - 139/85)  BP(mean): --  RR: 18 (24 Oct 2022 13:00) (18 - 18)  SpO2: 96% (24 Oct 2022 13:18) (95% - 96%)    Parameters below as of 24 Oct 2022 13:18  Patient On (Oxygen Delivery Method): room air        ________________________________________________  PHYSICAL EXAM:  GENERAL: NAD  HEENT: Normocephalic;  conjunctivae and sclerae clear; moist mucous membranes;   NECK : supple  CHEST/LUNG: Clear to auscultation bilaterally with good air entry   HEART: S1 S2  regular; no murmurs, gallops or rubs  ABDOMEN: Soft, Nontender, Nondistended; Bowel sounds present  EXTREMITIES: no cyanosis; no edema; no calf tenderness. +sling to right arm   SKIN: warm and dry; no rash  NERVOUS SYSTEM:  Awake and alert; Oriented  to place, person and time ; no new deficits    _________________________________________________  LABS:        CAPILLARY BLOOD GLUCOSE      POCT Blood Glucose.: 163 mg/dL (24 Oct 2022 11:22)  POCT Blood Glucose.: 114 mg/dL (24 Oct 2022 08:07)  POCT Blood Glucose.: 115 mg/dL (23 Oct 2022 16:42)        RADIOLOGY & ADDITIONAL TESTS:    Imaging  Reviewed:  YES    Consultant(s) Notes Reviewed:   YES      Plan of care was discussed with patient; all questions and concerns were addressed

## 2022-10-24 NOTE — PROGRESS NOTE ADULT - ASSESSMENT
82 year old female, from home, ambulates with cane, PMH HTN, HLD, DM, prior brain surgery (benign meningioma), prior cerebellar CVA, presents to the ED after fall at home. Found to be COVID + and X-Ray shows acute displaced angulated right humeral neck fracture. Admitted to medicine for syncope workup and management of humeral fracture. Ortho consulted and recommended conservative management. pain management following.  pt reccs [facility], pending choices from family and pt, CM following.

## 2022-10-24 NOTE — PROGRESS NOTE ADULT - NS ATTEND AMEND GEN_ALL_CORE FT
Patient seen and examined this afternoon    82 year old female, from home, ambulates with cane, PMH HTN, HLD, DM, prior brain surgery (benign meningioma), prior cerebellar CVA, presents to the ED after fall at home. Pt reported  2 nights ago, she was walking to her bedroom it was dark and she fell. Pt was not able to get up on her own. She states she felt vertigo, "the room spinning" after she fell and is not sure if she lost consciousness or not. She states she has 6/10 pain in her right shoulder. Her neighbor called EMS the next day when she checked in on her. Pt found to be COVID+ in the ED. Also with Right shoulder fracture. Patient was away to [facility] in August to September after Son passed away. She just returned at the end of September. She is visited by her grandkids usually. Marcia is NOK. She reported also stop taking Ozempic last week after she had Nausea and stomach upset but has been tolerating well for last few months.    Patient doing much better; pain much better controlled. denies SOB, palpitations, chest pain, nausea, vomiting, diarrhea, constipation, dizziness  She needs assistance with meals due to her dominant hand fracture in a sling and eating well.     Vital Signs Last 24 Hrs  T(C): 36.6 (24 Oct 2022 21:00), Max: 36.6 (24 Oct 2022 21:00)  T(F): 97.8 (24 Oct 2022 21:00), Max: 97.8 (24 Oct 2022 21:00)  HR: 80 (24 Oct 2022 21:00) (80 - 85)  BP: 116/53 (24 Oct 2022 21:00) (116/53 - 139/85)  RR: 18 (24 Oct 2022 21:00) (18 - 18)  SpO2: 94% (24 Oct 2022 21:00) (94% - 96%)  room air    P/E: Patient seen and examined this afternoon    82 year old female, from home, ambulates with cane, PMH HTN, HLD, DM, prior brain surgery (benign meningioma), prior cerebellar CVA, presents to the ED after fall at home. Pt reported  2 nights ago, she was walking to her bedroom it was dark and she fell. Pt was not able to get up on her own. She states she felt vertigo, "the room spinning" after she fell and is not sure if she lost consciousness or not. She states she has 6/10 pain in her right shoulder. Her neighbor called EMS the next day when she checked in on her. Pt found to be COVID+ in the ED. Also with Right shoulder fracture. Patient was away to [facility] in August to September after Son passed away. She just returned at the end of September. She is visited by her grandkids usually. Marcia is NOK. She reported also stop taking Ozempic last week after she had Nausea and stomach upset but has been tolerating well for last few months.    Patient doing much better; pain much better controlled. denies SOB, palpitations, chest pain, nausea, vomiting, diarrhea, constipation, dizziness  She needs assistance with meals due to her dominant hand fracture in a sling and eating well.     Vital Signs Last 24 Hrs  T(C): 36.6 (24 Oct 2022 21:00), Max: 36.6 (24 Oct 2022 21:00)  T(F): 97.8 (24 Oct 2022 21:00), Max: 97.8 (24 Oct 2022 21:00)  HR: 80 (24 Oct 2022 21:00) (80 - 85)  BP: 116/53 (24 Oct 2022 21:00) (116/53 - 139/85)  RR: 18 (24 Oct 2022 21:00) (18 - 18)  SpO2: 94% (24 Oct 2022 21:00) (94% - 96%)  room air    P/E:  elderly female, comfortable at rest , NAD  Psych: AAO x3  Neuro: No gross focal deficits; Power and sensation intact  CVS: S1S2 present, regular, no edema  Resp: BLAE+, No wheeze or Rhonchi  GI: Soft, BS+, Non tender, non distended  Extr: No  calf tenderness B/L Lower extremities  Right shoulder swelling, tenderness improved and limited ROM due to fracture; right arm sling in place  Skin: Warm and moist without any rashes    Labs: stable 10/22; repeat AM Patient seen and examined this afternoon    82 year old female, from home, ambulates with cane, PMH HTN, HLD, DM, prior brain surgery (benign meningioma), prior cerebellar CVA, presents to the ED after fall at home. Pt reported  2 nights ago, she was walking to her bedroom it was dark and she fell. Pt was not able to get up on her own. She states she felt vertigo, "the room spinning" after she fell and is not sure if she lost consciousness or not. She states she has 6/10 pain in her right shoulder. Her neighbor called EMS the next day when she checked in on her. Pt found to be COVID+ in the ED. Also with Right shoulder fracture. Patient was away to [facility] in August to September after Son passed away. She just returned at the end of September. She is visited by her grandkids usually. Marcia is NOK. She reported also stop taking Ozempic last week after she had Nausea and stomach upset but has been tolerating well for last few months.    Patient doing much better; pain much better controlled. denies SOB, palpitations, chest pain, nausea, vomiting, diarrhea, constipation, dizziness  She needs assistance with meals due to her dominant hand fracture in a sling and eating well.     Vital Signs Last 24 Hrs  T(C): 36.6 (24 Oct 2022 21:00), Max: 36.6 (24 Oct 2022 21:00)  T(F): 97.8 (24 Oct 2022 21:00), Max: 97.8 (24 Oct 2022 21:00)  HR: 80 (24 Oct 2022 21:00) (80 - 85)  BP: 116/53 (24 Oct 2022 21:00) (116/53 - 139/85)  RR: 18 (24 Oct 2022 21:00) (18 - 18)  SpO2: 94% (24 Oct 2022 21:00) (94% - 96%)  room air    P/E:  elderly female, comfortable at rest , NAD  Psych: AAO x3  Neuro: No gross focal deficits; Power and sensation intact  CVS: S1S2 present, regular, no edema  Resp: BLAE+, No wheeze or Rhonchi  GI: Soft, BS+, Non tender, non distended  Extr: No  calf tenderness B/L Lower extremities  Right shoulder swelling, tenderness improved and limited ROM due to fracture; right arm sling in place  Skin: Warm and moist without any rashes    Labs: stable CBC/ BMP 10/22; repeat AM    Thyroid Stimulating Hormone, Serum (10.19.22 @ 10:12) Thyroid Stimulating Hormone, Serum: 5.99 uU/mL     D-Dimer Assay, Quantitative (10.24.22 @ 06:31) D-Dimer Assay, Quantitative: 1353:    CAPILLARY BLOOD GLUCOSE  POCT Blood Glucose.: 125 mg/dL (24 Oct 2022 21:23)  POCT Blood Glucose.: 140 mg/dL (24 Oct 2022 16:37)  POCT Blood Glucose.: 163 mg/dL (24 Oct 2022 11:22)  POCT Blood Glucose.: 114 mg/dL (24 Oct 2022 08:07)      82 Female admitted s/p Mechanical fall noted to be COVID positive and Right shoulder fracture needing [facility] placement as live alone    D/D:  s/p Mechanical fall likely due to dehydration with volume depletion (resolved now) due to   COVID 19 infection plus age related physical debility likely  Right shoulder humeral neck fracture s/p fall conservative mgmt as per Ortho  Elevated TSH likely Sick Euthyroid state  Type 2 DM controlled  HTN controlled    Plan:   Patient doing well;  eating better;  off D/C IV fluid  Orthopedic consult recommended conservative mgmt; right arm sling in place  Pain well controlled with current regimen; Pain mgt follow up; d/w Suzette; Tylenol for mild pain and Oxycodone for moderate to severe pain  Continue Lidocaine patch; May give Morphine for severe pain  Continuer Metoprolol ER 12.5 mg daily  PT eval recommend IRIS; Patient in agreement; follow up with Case mgmt. Granddaughter (GD) assisting patient with choices;   d/w Marcia(LETTY) this evening, visiting [place] tomorrow; went to [facility] liked but patient want to go to [facility] in [facility] for now; May resume Ozempic as outpatient if sugars more than 200 as outpatient but seems Metformin itself is sufficient    May resume Metformin if sugars elevated persistently above 150; still 120 to 150 range. A1c 6.1 suggesting tight control in an elderly.   DVT ppx with Lovenox  BID as D-dimer was elevated >1000 but trending down; still elevated; could be elevated due to frascture also but on discharge should place on Xarelto 10 mg daily with dinner for 28 days; d/w Marcia    Repeat COVID swab once IRIS choices and accepted or hospital policy (every 7 days)    Discussed with patient findings and plan of care at length; I have d/w Granddaughter that sooner she chooses a place patient will get more PT in a Rehab setting than in hospital. She appear also needing an Auth which will take couple of days which was informed.   OOB to chair and PT follow up please  Discussed with ACP  I will be away 10/25/22 onwards. Hospitalist colleague to assume care

## 2022-10-24 NOTE — PROGRESS NOTE ADULT - ASSESSMENT
Confidential Drug Utilization Report  Search Terms: Yarelis Cordero, 1940Search Date: 10/21/2022 15:03:11 PM  The Drug Utilization Report below displays all of the controlled substance prescriptions, if any, that your patient has filled in the last twelve months. The information displayed on this report is compiled from pharmacy submissions to the Department, and accurately reflects the information as submitted by the pharmacies.    This report was requested by: Massiel Mckoy | Reference #: [id number]    There are no results for the search terms that you entered.

## 2022-10-25 ENCOUNTER — TRANSCRIPTION ENCOUNTER (OUTPATIENT)
Age: 82
End: 2022-10-25

## 2022-10-25 LAB
GLUCOSE BLDC GLUCOMTR-MCNC: 102 MG/DL — HIGH (ref 70–99)
GLUCOSE BLDC GLUCOMTR-MCNC: 113 MG/DL — HIGH (ref 70–99)
GLUCOSE BLDC GLUCOMTR-MCNC: 116 MG/DL — HIGH (ref 70–99)
GLUCOSE BLDC GLUCOMTR-MCNC: 138 MG/DL — HIGH (ref 70–99)

## 2022-10-25 PROCEDURE — 99232 SBSQ HOSP IP/OBS MODERATE 35: CPT

## 2022-10-25 RX ADMIN — Medication 1000 MILLIGRAM(S): at 15:57

## 2022-10-25 RX ADMIN — Medication 1000 MILLIGRAM(S): at 22:00

## 2022-10-25 RX ADMIN — Medication 375 MILLIGRAM(S): at 06:07

## 2022-10-25 RX ADMIN — Medication 1000 MILLIGRAM(S): at 21:06

## 2022-10-25 RX ADMIN — Medication 1000 MILLIGRAM(S): at 00:18

## 2022-10-25 RX ADMIN — SENNA PLUS 2 TABLET(S): 8.6 TABLET ORAL at 21:06

## 2022-10-25 RX ADMIN — MAGNESIUM HYDROXIDE 30 MILLILITER(S): 400 TABLET, CHEWABLE ORAL at 21:07

## 2022-10-25 RX ADMIN — Medication 1000 MILLIGRAM(S): at 06:06

## 2022-10-25 RX ADMIN — LIDOCAINE 1 PATCH: 4 CREAM TOPICAL at 20:00

## 2022-10-25 RX ADMIN — ENOXAPARIN SODIUM 40 MILLIGRAM(S): 100 INJECTION SUBCUTANEOUS at 05:57

## 2022-10-25 RX ADMIN — Medication 1000 MILLIGRAM(S): at 05:57

## 2022-10-25 RX ADMIN — LIDOCAINE 1 PATCH: 4 CREAM TOPICAL at 15:45

## 2022-10-25 RX ADMIN — Medication 375 MILLIGRAM(S): at 00:19

## 2022-10-25 RX ADMIN — ATORVASTATIN CALCIUM 40 MILLIGRAM(S): 80 TABLET, FILM COATED ORAL at 21:07

## 2022-10-25 RX ADMIN — Medication 1000 MILLIGRAM(S): at 17:28

## 2022-10-25 RX ADMIN — Medication 1 ENEMA: at 17:52

## 2022-10-25 RX ADMIN — Medication 375 MILLIGRAM(S): at 05:57

## 2022-10-25 NOTE — DISCHARGE NOTE PROVIDER - CARE PROVIDER_API CALL
TAYA ALEXIS  Internal Medicine  N  VANESA BAIN, Phys,    Phone: ([phone]  Fax: ([phone]  Established Patient  Follow Up Time: 2 weeks    Taya Alexis  [address] [facility]   [address] [place] [address]  Phone: [phone]  Fax: [phone]  Established Patient  Follow Up Time: 2 weeks

## 2022-10-25 NOTE — DISCHARGE NOTE PROVIDER - NSFOLLOWUPCLINICS_GEN_ALL_ED_FT
[facility]  Orthopedics  [address] [place] [address]  Phone: [phone]  Fax: [phone]  Follow Up Time: 1 week

## 2022-10-25 NOTE — PROGRESS NOTE ADULT - ASSESSMENT
82 year old female, from home, ambulates with cane, PMH HTN, HLD, DM, prior brain surgery (benign meningioma), prior cerebellar CVA, presents to the ED after fall at home. Found to be COVID + and X-Ray shows acute displaced angulated right humeral neck fracture. Admitted to medicine for syncope workup and management of humeral fracture. Ortho consulted and recommended conservative management. pain management following.  pt reccs IRIS, pending auth  CM following.  82 year old female, from home, ambulates with cane, PMH HTN, HLD, DM, prior brain surgery (benign meningioma), prior cerebellar CVA, presents to the ED after fall at home. Found to be COVID + and X-Ray shows acute displaced angulated right humeral neck fracture. Admitted to medicine for syncope workup and management of humeral fracture. Ortho consulted and recommended conservative management. pain management following.  pt reccs [facility], pt accepted to [facility], however will need total 10 day quarantine (10/29). pending auth.

## 2022-10-25 NOTE — PROGRESS NOTE ADULT - PROBLEM SELECTOR PLAN 1
Pt with pain which is somatic in nature due to displaced proximal humeral fracture. Patient seen by Ortho team, treat Fx conservatively. High risk medications reviewed. Avoid polypharmacy. Avoid IV opioids. Avoid NSAIDs and benzodiazepines. Non-pharmacological sleep aides initiated. Non-opioid medications and non-pharmacological pain management measures initiated.  Opioid pain recommendations   - Continue oxycodone 2.5 mg PO q 4 hours PRN for severe pain  Non-opioid pain recommendations   - Continue naproxen 375mg PO q8h x 3 days, with PPI. Discussed with MD Mayorga  - Continue Acetaminophen 1 gram PO q 8 hours for 3 days. Monitor LFTs  - Continue Lidoderm 4% patch daily.   - Continue Gabapentin 100mg PO BID.   Bowel Regimen  - Continue Miralax 17G PO daily  - Continue Senna 2 tablets at bedtime for constipation  - Fleet enema x 1.

## 2022-10-25 NOTE — PROGRESS NOTE ADULT - SUBJECTIVE AND OBJECTIVE BOX
Source of information: MARTELL GEE, Chart review  Patient language: English  : n/a    HPI:  82 year old female, from home, ambulates with cane, PMH HTN, HLD, DM, prior brain surgery (benign meningioma), prior cerebellar CVA, presents to the ED after fall at home. Pt states that 2 nights ago, she was walking to her bedroom and felt as if the distance was further. She states it was dark and she fell. Pt was not able to get up on her own. She states she felt vertigo, "the room spinning" after she fell and is not sure if she lost consciousness or not. She states she has 6/10 pain in her right shoulder. Her neighbor called EMS the next day when she checked in on her. Pt states that she recently traveled back from [facility] a couple weeks ago. She also recently stopped taking Ozempic 1 week ago due to symptoms of nausea and diarrhea which she attributed to the medication. Pt found to be COVID+ in the ED. She states she has post-nasal drip and minor chills but otherwise denies headache, fever, cough, shortness of breath, sore throat, chest pain, palpitations, abdominal pain, diarrhea, constipation or dysuria.    In ED: vitals /69, HR 96, Temp 98.1F, 98% on RA  CT Head and Cervical Spine shows no acute intracranial CT abnormality. No evidence of acute cervical spine fracture or traumatic malalignment, within the limitations of motion artifact. Hemorrhage in the right axillary region, secondary to displaced proximal humeral fracture demonstrated on the shoulder radiographs.  s/p 500cc IVF NS bolus in ED (19 Oct 2022 09:24)      Pt is admitted for s/p Fall. Pain consulted for Rt shoulder/arm pain. Pt with displaced proximal humeral fracture showed on CT. Pt seen and examined at bedside. + covid 10/19. Airborne/ contact precautions maintained. Pt laying in bed, reports right shoulder pain score 7-8/10 and not tolerable SCALE USED: (1-10 VNRS). Requesting Tylenol, RN to administer. Pt describes pain as sharp, radiating from R shoulder to R arm,  alleviated by pain medication and exacerbated by movement. R arm sling in place. Pt tolerating PO diet. Denies lethargy, nausea, vomiting, itchiness. Reports constipation, last BM 10/18, and hx of occasionally constipation. Patient stated goal for pain control: to be able to take deep breaths, get out of bed to chair and ambulate with tolerable pain control. Pt reports taking Tylenol 500 mg po for mild pain and headaches at home as needed. Patient stating living alone at home, baseline uses cane for walking and when outside uses walker. Patient seen by Ortho team, will treat Fx conservatively. Plan to be [facility] to [facility].     PAST MEDICAL & SURGICAL HISTORY:  Brain Tumor (Benign)    Nephrolithiasis    Headache  undiagnosed for years- exacerbation treated with Prednisone    Hyperlipidemia    Diabetes    Ischemic stroke    HTN (hypertension)    DM (diabetes mellitus)    HLD (hyperlipidemia)    Brain Tumor (Benign)    H/O brain surgery  benign    FAMILY HISTORY:  Family history of cardiomyopathy    FH: heart attack (Father)    FH: heart disease (Child)    Social History:  Pt lives at home alone, no HHA. Denies tobacco, alcohol or illicit drug use. (19 Oct 2022 09:24)   [x] Denies ETOH use, illicit drug use and smoking    Allergies    penicillin (Unknown)- rash    MEDICATIONS  (STANDING):  acetaminophen     Tablet .. 1000 milliGRAM(s) Oral every 8 hours  atorvastatin 40 milliGRAM(s) Oral at bedtime  enoxaparin Injectable 40 milliGRAM(s) SubCutaneous every 12 hours  gabapentin 100 milliGRAM(s) Oral two times a day  insulin lispro (ADMELOG) corrective regimen sliding scale   SubCutaneous three times a day before meals  insulin lispro (ADMELOG) corrective regimen sliding scale   SubCutaneous at bedtime  lidocaine   4% Patch 1 Patch Transdermal daily  metoprolol succinate ER 12.5 milliGRAM(s) Oral daily  naproxen 375 milliGRAM(s) Oral every 8 hours  pantoprazole    Tablet 40 milliGRAM(s) Oral before breakfast  saline laxative (FLEET) Rectal Enema 1 Enema Rectal once  senna 2 Tablet(s) Oral at bedtime  sodium chloride 0.9%. 1000 milliLiter(s) (75 mL/Hr) IV Continuous <Continuous>    MEDICATIONS  (PRN):  aluminum hydroxide/magnesium hydroxide/simethicone Suspension 30 milliLiter(s) Oral every 4 hours PRN Dyspepsia  magnesium hydroxide Suspension 30 milliLiter(s) Oral daily PRN Constipation  melatonin 3 milliGRAM(s) Oral at bedtime PRN Insomnia  ondansetron Injectable 4 milliGRAM(s) IV Push every 8 hours PRN Nausea and/or Vomiting  oxyCODONE    IR 2.5 milliGRAM(s) Oral every 4 hours PRN Severe Pain (7 - 10)      Vital Signs Last 24 Hrs  T(C): 36.5 (25 Oct 2022 11:42), Max: 36.6 (24 Oct 2022 21:00)  T(F): 97.7 (25 Oct 2022 11:42), Max: 97.8 (24 Oct 2022 21:00)  HR: 82 (25 Oct 2022 11:45) (80 - 83)  BP: 129/67 (25 Oct 2022 11:45) (116/53 - 139/61)  BP(mean): --  RR: 18 (25 Oct 2022 11:42) (18 - 18)  SpO2: 96% (25 Oct 2022 11:45) (94% - 96%)    Parameters below as of 25 Oct 2022 11:45  Patient On (Oxygen Delivery Method): room air      COVID-19 PCR: Detected (24 Oct 2022 18:24)  COVID-19 PCR: Detected (19 Oct 2022 00:13)    LABS: Reviewed    CAPILLARY BLOOD GLUCOSE      POCT Blood Glucose.: 138 mg/dL (25 Oct 2022 11:56)  POCT Blood Glucose.: 102 mg/dL (25 Oct 2022 08:31)  POCT Blood Glucose.: 125 mg/dL (24 Oct 2022 21:23)  POCT Blood Glucose.: 140 mg/dL (24 Oct 2022 16:37)    COVID-19 PCR: Detected (24 Oct 2022 18:24)  COVID-19 PCR: Detected (19 Oct 2022 00:13)   Radiology: Reviewed.   < from: Xray Humerus, Right (10.19.22 @ 04:48) >    ACC: [id number] EXAM:  XR HUMERUS MIN 2 VIEWS RT                          PROCEDURE DATE:  10/19/2022          INTERPRETATION:  Right humerus    HISTORY: Shoulder fracture     Two views of the right humerus show no evidence of fracture nor   destructive change below the surgical neck. The distal joint spaces are   maintained.    IMPRESSION: No acute bony pathology distally.        Thank you for this referral.    --- End of Report ---            YOLETTE MALIN MD; Attending Interventional Radiologist  This document has been electronically signed. Oct 21 2022  9:24AM    < end of copied text >    < from: Xray Shoulder 2 Views, Right (10.19.22 @ 04:48) >    ACC: [id number] EXAM:  XR SHOULDER COMP MIN 2V RT                          PROCEDURE DATE:  10/19/2022          INTERPRETATION:  Right shoulder    HISTORY: Patient fell     Three views of the right shoulder show angulated and displaced   comminuted fracture of the surgical neck. The joint spaces are maintained.    IMPRESSION: Surgical neck fracture.    The above findings correspond to a note entered into the hospital's image   software system by the emergency department staff at the time of the   study.     Thank you for this referral.    --- End of Report ---            YOLETTE MALIN MD; Attending Interventional Radiologist  This document has been electronically signed. Oct 21 2022  9:23AM    < end of copied text >      ACC: [id number] EXAM:  CT BRAIN                        ACC: [id number] EXAM:  CT CERVICAL SPINE                          PROCEDURE DATE:  10/19/2022      INTERPRETATION:  EXAMINATION: CT CERVICAL SPINE, CT HEAD    DATE: 10/19/2022 6:30 AM    INDICATION: Trauma    COMPARISON: None available    TECHNIQUE: Thin section noncontrast axial images were obtained through   the head and cervical spine. Multiplanar reformats submitted.    FINDINGS:    CT HEAD: No acute intracranial hemorrhage or suspicious extra-axial fluid   collection. Left frontal encephalomalacia with overlying craniectomy. No   focal edema or acute mass effect. No hydrocephalus or central herniation.   Moderate cerebral atrophy and mild chronic microvascular ischemic change.    Minimal paranasal sinus mucosal disease. Minimal opacification of the   mastoids, more prominent on the left. Scalp and image midfacial soft   tissues are unremarkable.    CT cervical spine:  The imaged skull base and craniovertebral alignment are intact. Vertebral   body heights are maintained. No definite listhesis, although motion   artifact partially degrades evaluation. Within the limitations of motion,   no acute fracture.    Degenerative disc disease most prominent at C5-6 where there is narrowing   of the central canal to 7 mm and moderate right foraminal stenosis.    Imaged lung apices are clear. Hemorrhage in the right axillary region. No   prevertebral edema.    IMPRESSION:  1. No acute intracranial CT abnormality.  2. No evidence ofacute cervical spine fracture or traumatic   malalignment, within the limitations of motion artifact.  3. Hemorrhage in the right axillary region, secondary to displaced   proximal humeral fracture demonstrated on the shoulder radiographs.    --- Endof Report ---    CRISTOBAL LEON MD; Attending Radiologist  This document has been electronically signed. Oct 19 2022  7:05AM  [facility]: [id number] EXAM:  XR HUMERUS MIN 2 VIEWS RT                          PROCEDURE DATE:  10/19/2022      INTERPRETATION:  Right humerus    HISTORY: Shoulder fracture     Two views of the right humerus show no evidence of fracture nor   destructive change below the surgical neck. The distal joint spaces are   maintained.    IMPRESSION: No acute bony pathology distally.    Thank you for this referral.    --- End of Report ---    YOLETTE MALIN MD; Attending Interventional Radiologist  This document has been electronically signed. Oct 21 2022  9:24AM  CC: [id number] EXAM:  XR SHOULDER COMP MIN 2V RT                          PROCEDURE DATE:  10/19/2022      INTERPRETATION:  Right shoulder    HISTORY: Patient fell     Three views of the right shoulder show angulated and displaced   comminuted fracture of the surgical neck. The joint spaces are maintained.    IMPRESSION: Surgical neck fracture.    The above findings correspond to a note entered into the hospital's image   software system by the emergency department staff at the time of the   study.     Thank you for this referral.    --- End of Report ---    YOLETTE MALIN MD; Attending Interventional Radiologist  This document has been electronically signed. Oct 21 2022  9:23AM    ORT Score -   Family Hx of substance abuse	Female	      Male  Alcohol 	                                           1                     3  Illegal drugs	                                   2                     3  Rx drugs                                           4 	                  4  Personal Hx of substance abuse		  Alcohol 	                                          3	                  3  Illegal drugs                                     4	                  4  Rx drugs                                            5 	                  5  Age between 16- 45 years	           1                     1  hx preadolescent sexual abuse	   3 	                  0  Psychological disease		  ADD, OCD, bipolar, schizophrenia   2	          2  Depression                                           1 	          1  Total: 0    a score of 3 or lower indicates low risk for opioid abuse		  a score of 4-7 indicates moderate risk for opioid abuse		  a score of 8 or higher indicates high risk for opioid abuse  	  REVIEW OF SYSTEMS:  CONSTITUTIONAL: No fever or fatigue  RESPIRATORY: No cough, wheezing, chills or hemoptysis; No shortness of breath  CARDIOVASCULAR: No chest pain, palpitations, dizziness, or leg swelling  GASTROINTESTINAL: + loss of appetite, + decreased PO intake. No abdominal or epigastric pain. No nausea, vomiting; No diarrhea, + constipation.   GENITOURINARY: No dysuria, frequency, hematuria, retention or incontinence  MUSCULOSKELETAL: + R shoulder/arm joint pain No swelling; + decreased ROM right arm d/t fx; no upper or lower motor strength weakness, no saddle anesthesia, bowel/bladder incontinence, + hx falls   NEURO: No headaches, No numbness/tingling b/l LE    PHYSICAL EXAM:  GENERAL:  Alert & Oriented X4, cooperative, NAD. Speech is clear.   RESPIRATORY: Respirations even and unlabored. Clear to auscultation bilaterally; No rales, rhonchi, wheezing, or rubs  CARDIOVASCULAR: Normal S1/S2, regular rate and rhythm; No murmurs, rubs, or gallops. No JVD.   GASTROINTESTINAL:  Soft, Nontender, Nondistended; Bowel sounds present  PERIPHERAL VASCULAR:  Extremities warm without edema. 2+ Peripheral Pulses, No cyanosis, No calf tenderness  MUSCULOSKELETAL: Motor Strength 4/5 B/L upper and 5/5 lower extremities; + ROM decreased RUE; negative SLR; + tenderness on palpation of R shoulder and upper arm + right shoulder in sling   SKIN: Warm, dry, intact. No rashes, lesions, scars or wounds. R upper shoulder/arm with +ecchymosis. R arm sling in place.    Risk factors associated with adverse outcomes related to opioid treatment  [ ]  Concurrent benzodiazepine use  [ ]  History/ Active substance use or alcohol use disorder  [ ] Psychiatric co-morbidity  [ ] Sleep apnea  [ ] COPD  [ ] BMI> 35  [ ] Liver dysfunction  [ ] Renal dysfunction  [ ] CHF  [ ] Smoker  [x]  Age > 60 years    [x)  [place]  Reviewed and Copied to Chart. See below.    Plan of care and goal oriented pain management treatment options were discussed with patient and /or primary care giver; all questions and concerns were addressed and care was aligned with patient's wishes.    Educated patient on goal oriented pain management treatment options     10-25-22 @ 15:56

## 2022-10-25 NOTE — DISCHARGE NOTE PROVIDER - NSDCMRMEDTOKEN_GEN_ALL_CORE_FT
atorvastatin 40 mg oral tablet: 1 tab(s) orally once a day  docusate calcium 240 mg oral capsule: 1 cap(s) orally once a day  metFORMIN 500 mg oral tablet: 1 tab(s) orally 2 times a day  metoprolol succinate 25 mg oral tablet, extended release: 0.5 tab(s) orally once a day  Ozempic: 1 milligram(s) subcutaneous once a week  pantoprazole 40 mg oral delayed release tablet: 1 tab(s) orally once a day  Senna 8.6 mg oral tablet: 1 tab(s) orally once a day (at bedtime)  Vitamin D2 1.25 mg (50,000 intl units) oral capsule: 1 cap(s) orally once a week   acetaminophen 500 mg oral tablet: 2 tab(s) orally every 8 hours, As needed, Moderate Pain (4 - 6)  Aspirin Enteric Coated 81 mg oral delayed release tablet: 1 tab(s) orally once a day   atorvastatin 40 mg oral tablet: 1 tab(s) orally once a day  docusate calcium 240 mg oral capsule: 1 cap(s) orally once a day  gabapentin 100 mg oral capsule: 1 cap(s) orally 2 times a day  lidocaine 4% topical film: Apply topically to affected area 2 times a day  metFORMIN 500 mg oral tablet: 1 tab(s) orally 2 times a day  metoprolol succinate 25 mg oral tablet, extended release: 0.5 tab(s) orally once a day  oxyCODONE: 2.5 milligram(s) orally every 4 hours, As Needed for severe pain  Ozempic: 1 milligram(s) subcutaneous once a week  pantoprazole 40 mg oral delayed release tablet: 1 tab(s) orally once a day  Senna 8.6 mg oral tablet: 1 tab(s) orally once a day (at bedtime)  Vitamin D2 1.25 mg (50,000 intl units) oral capsule: 1 cap(s) orally once a week

## 2022-10-25 NOTE — PROGRESS NOTE ADULT - SUBJECTIVE AND OBJECTIVE BOX
NP Note discussed with  Primary Attending    Patient is a 82y old  Female who presents with a chief complaint of fall, right humerus fracture (24 Oct 2022 16:29)      INTERVAL HPI/OVERNIGHT EVENTS: no new complaints    MEDICATIONS  (STANDING):  acetaminophen     Tablet .. 1000 milliGRAM(s) Oral every 8 hours  atorvastatin 40 milliGRAM(s) Oral at bedtime  enoxaparin Injectable 40 milliGRAM(s) SubCutaneous every 12 hours  gabapentin 100 milliGRAM(s) Oral two times a day  insulin lispro (ADMELOG) corrective regimen sliding scale   SubCutaneous three times a day before meals  insulin lispro (ADMELOG) corrective regimen sliding scale   SubCutaneous at bedtime  lidocaine   4% Patch 1 Patch Transdermal daily  metoprolol succinate ER 12.5 milliGRAM(s) Oral daily  naproxen 375 milliGRAM(s) Oral every 8 hours  pantoprazole    Tablet 40 milliGRAM(s) Oral before breakfast  senna 2 Tablet(s) Oral at bedtime  sodium chloride 0.9%. 1000 milliLiter(s) (75 mL/Hr) IV Continuous <Continuous>    MEDICATIONS  (PRN):  aluminum hydroxide/magnesium hydroxide/simethicone Suspension 30 milliLiter(s) Oral every 4 hours PRN Dyspepsia  magnesium hydroxide Suspension 30 milliLiter(s) Oral daily PRN Constipation  melatonin 3 milliGRAM(s) Oral at bedtime PRN Insomnia  ondansetron Injectable 4 milliGRAM(s) IV Push every 8 hours PRN Nausea and/or Vomiting  oxyCODONE    IR 2.5 milliGRAM(s) Oral every 4 hours PRN Severe Pain (7 - 10)      __________________________________________________  REVIEW OF SYSTEMS:    CONSTITUTIONAL: No fever,   EYES: no acute visual disturbances  NECK: No pain or stiffness  RESPIRATORY: No cough; No shortness of breath  CARDIOVASCULAR: No chest pain, no palpitations  GASTROINTESTINAL: No pain. No nausea or vomiting; No diarrhea   NEUROLOGICAL: No headache or numbness, no tremors  MUSCULOSKELETAL: No joint pain, no muscle pain  GENITOURINARY: no dysuria, no frequency, no hesitancy  PSYCHIATRY: no depression , no anxiety  ALL OTHER  ROS negative        Vital Signs Last 24 Hrs  T(C): 36.5 (25 Oct 2022 11:42), Max: 36.6 (24 Oct 2022 21:00)  T(F): 97.7 (25 Oct 2022 11:42), Max: 97.8 (24 Oct 2022 21:00)  HR: 82 (25 Oct 2022 11:42) (80 - 83)  BP: 129/67 (25 Oct 2022 11:42) (116/53 - 139/61)  BP(mean): --  RR: 18 (25 Oct 2022 11:42) (18 - 18)  SpO2: 96% (25 Oct 2022 11:42) (94% - 96%)    Parameters below as of 25 Oct 2022 11:42  Patient On (Oxygen Delivery Method): room air        ________________________________________________  PHYSICAL EXAM:  GENERAL: NAD  HEENT: Normocephalic;  conjunctivae and sclerae clear; moist mucous membranes;   NECK : supple  CHEST/LUNG: Clear to auscultation bilaterally with good air entry   HEART: S1 S2  regular; no murmurs, gallops or rubs  ABDOMEN: Soft, Nontender, Nondistended; Bowel sounds present  EXTREMITIES: no cyanosis; no edema; no calf tenderness  SKIN: warm and dry; no rash  NERVOUS SYSTEM:  Awake and alert; Oriented  to place, person and time ; no new deficits    _________________________________________________  LABS:              CAPILLARY BLOOD GLUCOSE      POCT Blood Glucose.: 138 mg/dL (25 Oct 2022 11:56)  POCT Blood Glucose.: 102 mg/dL (25 Oct 2022 08:31)  POCT Blood Glucose.: 125 mg/dL (24 Oct 2022 21:23)  POCT Blood Glucose.: 140 mg/dL (24 Oct 2022 16:37)        RADIOLOGY & ADDITIONAL TESTS:    Imaging  Reviewed:  YES/NO    Consultant(s) Notes Reviewed:   YES/ No      Plan of care was discussed with patient and /or primary care giver; all questions and concerns were addressed  NP Note discussed with  Primary Attending    Patient is a 82y old  Female who presents with a chief complaint of fall, right humerus fracture (24 Oct 2022 16:29)      INTERVAL HPI/OVERNIGHT EVENTS: no new complaints    MEDICATIONS  (STANDING):  acetaminophen     Tablet .. 1000 milliGRAM(s) Oral every 8 hours  atorvastatin 40 milliGRAM(s) Oral at bedtime  enoxaparin Injectable 40 milliGRAM(s) SubCutaneous every 12 hours  gabapentin 100 milliGRAM(s) Oral two times a day  insulin lispro (ADMELOG) corrective regimen sliding scale   SubCutaneous three times a day before meals  insulin lispro (ADMELOG) corrective regimen sliding scale   SubCutaneous at bedtime  lidocaine   4% Patch 1 Patch Transdermal daily  metoprolol succinate ER 12.5 milliGRAM(s) Oral daily  naproxen 375 milliGRAM(s) Oral every 8 hours  pantoprazole    Tablet 40 milliGRAM(s) Oral before breakfast  senna 2 Tablet(s) Oral at bedtime  sodium chloride 0.9%. 1000 milliLiter(s) (75 mL/Hr) IV Continuous <Continuous>    MEDICATIONS  (PRN):  aluminum hydroxide/magnesium hydroxide/simethicone Suspension 30 milliLiter(s) Oral every 4 hours PRN Dyspepsia  magnesium hydroxide Suspension 30 milliLiter(s) Oral daily PRN Constipation  melatonin 3 milliGRAM(s) Oral at bedtime PRN Insomnia  ondansetron Injectable 4 milliGRAM(s) IV Push every 8 hours PRN Nausea and/or Vomiting  oxyCODONE    IR 2.5 milliGRAM(s) Oral every 4 hours PRN Severe Pain (7 - 10)      __________________________________________________  REVIEW OF SYSTEMS:    CONSTITUTIONAL: No fever,   EYES: no acute visual disturbances  NECK: No pain or stiffness  RESPIRATORY: No cough; No shortness of breath  CARDIOVASCULAR: No chest pain, no palpitations  GASTROINTESTINAL: No pain. No nausea or vomiting; No diarrhea   NEUROLOGICAL: No headache or numbness, no tremors  MUSCULOSKELETAL: No joint pain, no muscle pain  GENITOURINARY: no dysuria, no frequency, no hesitancy  PSYCHIATRY: no depression , no anxiety  ALL OTHER  ROS negative        Vital Signs Last 24 Hrs  T(C): 36.5 (25 Oct 2022 11:42), Max: 36.6 (24 Oct 2022 21:00)  T(F): 97.7 (25 Oct 2022 11:42), Max: 97.8 (24 Oct 2022 21:00)  HR: 82 (25 Oct 2022 11:42) (80 - 83)  BP: 129/67 (25 Oct 2022 11:42) (116/53 - 139/61)  BP(mean): --  RR: 18 (25 Oct 2022 11:42) (18 - 18)  SpO2: 96% (25 Oct 2022 11:42) (94% - 96%)    Parameters below as of 25 Oct 2022 11:42  Patient On (Oxygen Delivery Method): room air        ________________________________________________  PHYSICAL EXAM:  GENERAL: NAD  HEENT: Normocephalic;  conjunctivae and sclerae clear; moist mucous membranes;   NECK : supple  CHEST/LUNG: Clear to auscultation bilaterally with good air entry   HEART: S1 S2  regular; no murmurs, gallops or rubs  ABDOMEN: Soft, Nontender, Nondistended; Bowel sounds present  EXTREMITIES: no cyanosis; no edema; no calf tenderness  SKIN: warm and dry; no rash  NERVOUS SYSTEM:  Awake and alert; Oriented  to place, person and time ; no new deficits  _________________________________________________  LABS:        CAPILLARY BLOOD GLUCOSE      POCT Blood Glucose.: 138 mg/dL (25 Oct 2022 11:56)  POCT Blood Glucose.: 102 mg/dL (25 Oct 2022 08:31)  POCT Blood Glucose.: 125 mg/dL (24 Oct 2022 21:23)  POCT Blood Glucose.: 140 mg/dL (24 Oct 2022 16:37)        RADIOLOGY & ADDITIONAL TESTS:    Imaging  Reviewed:  YES    Consultant(s) Notes Reviewed:   YES       Plan of care was discussed with patient; all questions and concerns were addressed

## 2022-10-25 NOTE — DISCHARGE NOTE PROVIDER - NSDCCPCAREPLAN_GEN_ALL_CORE_FT
PRINCIPAL DISCHARGE DIAGNOSIS  Diagnosis: Syncope  Assessment and Plan of Treatment:       SECONDARY DISCHARGE DIAGNOSES  Diagnosis: Fracture of humeral head  Assessment and Plan of Treatment: presented after having a fall at home and with Painful R shoulder with ecchymosis and limited ROM.   evaluated by the orthopedics team and treated with conservative management without surgical intervention. Please keep your sling x 2 weeks,  early ROM  NWB RUE  May benefit from [facility]  F/U in clinic 1 week from [place].     PRINCIPAL DISCHARGE DIAGNOSIS  Diagnosis: Syncope  Assessment and Plan of Treatment: you came into the hospital after a fall and was concerned for passing out  you were then found to have a fracture in your right upper arm.   CT Head and Cervical Spine shows no acute intracranial CT abnormality. No evidence of acute cervical spine fracture or traumatic malalignment, within the limitations of motion artifact.   WHAT YOU NEED TO KNOW:  Syncope is also called fainting or passing out. Syncope is a sudden, temporary loss of consciousness, followed by a fall from a standing or sitting position. A syncope episode is usually short.  DISCHARGE INSTRUCTIONS:  Seek care immediately if:  You are bleeding because you accidently hit your head after fainting.  You suddenly have double vision, difficulty speaking, numbness, and cannot move your arms or legs.  You have chest pain and trouble breathing.  You vomit blood or material that looks like coffee grounds.  You see blood in your bowel movement.  Contact your healthcare provider if:  You have another fainting spell.  You have a headache, fast heartbeat, or feel too dizzy to stand up.  You have questions or concerns about your condition or care.        SECONDARY DISCHARGE DIAGNOSES  Diagnosis: Fracture of humeral head  Assessment and Plan of Treatment: presented after having a fall at home and with Painful R shoulder with ecchymosis and limited range of motion  you have a Right Proximal Surgical Neck Humerus fracture  evaluated by the orthopedics team and treated with conservative management without surgical intervention. Please keep your sling x 2 weeks.  continue early range of motion, NonWeightBearing on right arm  continue to take tylenol 650 mg every 6 hours as needed for mild pain  lidocaine   4% Patch 1 Patch Transdermal daily  gabapentin 100 milliGRAM(s) Oral two times a day  oxyCODONE  IR 2.5 milliGRAM(s) Oral every 4 hours PRN Severe Pain (7 - 10)  follow up with orthopedic clinic in 1 week.    Diagnosis: 2019 novel coronavirus disease (COVID-19)  Assessment and Plan of Treatment: covid is spread from person to person after close contact with the infected by droplets that become airborne after a cough or sneeze. Transmission can also occur by touching contaminated surfaces. Wash your hands, cover your cough, disinfect, avoid close contact. wear a mask until your symptoms resolve.  you were found covid positive on date: 10/19  you completed isolation on 10/29 and was covid negative on 10/30.  continue taking tylenol 650 mg for fever 100.4F or more, or if you have mild body aches  you had an elevated d-dimer of 1300  continue aspirin 81 mg daily for 30 days until 11/30 to prevent blood clots.    Diagnosis: HTN (hypertension)  Assessment and Plan of Treatment: your blood pressure ranged between: (120/42 - 124/53)  continue taking metoprolol succinate 12.5 mg oral tablet, extended release daily  follow up with your primary care doctor or cardiologist.  try to take your blood pressure readings twice a day, morning and night time.   Notify your doctor if you have any of the following symptoms:   Dizziness, Lightheadedness, Blurry vision, Headache, Chest pain, Shortness of breath      Diagnosis: DM (diabetes mellitus)  Assessment and Plan of Treatment: continue home medication Ozempic: 1 milligram(s) subcutaneous once a week, metFORMIN 500 mg oral tablet: 1 tab(s) orally 2 times a day       Diagnosis: HLD (hyperlipidemia)  Assessment and Plan of Treatment: continue your home medication atorvastatin 40 mg oral tablet: 1 tab(s) orally once a day    Diagnosis: Pressure ulcer  Assessment and Plan of Treatment: you were seen by a wound care nurse during hospitalization  -Apply a foam dressing to the Coccyx area Q 72hrs PRN  -Elevate/float the patients heels using heel protectors and reposition the patient Q 2hrs using wedges or pillows

## 2022-10-25 NOTE — PROGRESS NOTE ADULT - NS ATTEND AMEND GEN_ALL_CORE FT
Patient is seating in a chair.  c/o pain in shoulder.  Asks to go back to bed.    Alert, cooperative woman in NAD  vs, as above  Lungs, clear  Cor, RRR  Abdomen, soft  right arm is in a sling  Neurological, intact    CAPILLARY BLOOD GLUCOSE      POCT Blood Glucose.: 113 mg/dL (25 Oct 2022 16:40)  POCT Blood Glucose.: 138 mg/dL (25 Oct 2022 11:56)  POCT Blood Glucose.: 102 mg/dL (25 Oct 2022 08:31)  POCT Blood Glucose.: 125 mg/dL (24 Oct 2022 21:23)    IMP:  fracture of right humerus after a mechanical fall          COVID - 19 infection without hypoxia          hypertension          DM  Plan: Continue analgesia and support          Refer to [facility]

## 2022-10-25 NOTE — DISCHARGE NOTE PROVIDER - NSDCFUADDINST_GEN_ALL_CORE_FT
Planned for conservative tx for right shoulder FX   Sling x 2 weeks, early ROM  NWB RUE  F[facility]

## 2022-10-25 NOTE — DISCHARGE NOTE PROVIDER - PROVIDER TOKENS
PROVIDER:[TOKEN:[[id number]],FOLLOWUP:[2 weeks],ESTABLISHEDPATIENT:[T]],FREE:[LAST:[Jack],FIRST:[Za],PHONE:[phone]],FAX:[phone]],ADDRESS:[[address] [place] [address]],FOLLOWUP:[2 weeks],ESTABLISHEDPATIENT:[T]]

## 2022-10-25 NOTE — PROGRESS NOTE ADULT - ASSESSMENT
Confidential Drug Utilization Report  Search Terms: Yarelis Cordero, 1940Search Date: 10/21/2022 15:03:11 PM  The Drug Utilization Report below displays all of the controlled substance prescriptions, if any, that your patient has filled in the last twelve months. The information displayed on this report is compiled from pharmacy submissions to the Department, and accurately reflects the information as submitted by the pharmacies.    This report was requested by: Massiel Mckoy | Reference #: [id number]    There are no results for the search terms that you entered.

## 2022-10-25 NOTE — DISCHARGE NOTE PROVIDER - HOSPITAL COURSE
82 year old female, from home, ambulates with cane, PMH HTN, HLD, DM, prior brain surgery (benign meningioma), prior cerebellar CVA, presents to the ED after fall at home. Found to be COVID + and X-Ray shows acute displaced angulated right humeral neck fracture. Admitted to medicine for syncope workup and management of humeral fracture. Ortho consulted and recommended conservative management. pain management following.  pt reccs [facility], pt accepted to [facility], however will need total 10 day quarantine (10/29). pending auth.     **************************INCOMPLETE 10/25/22******************* 82 year old female, from home, ambulates with cane, PMH HTN, HLD, DM, prior brain surgery (benign meningioma), prior cerebellar CVA, presents to the ED after fall at home. Found to be COVID + and X-Ray shows acute displaced angulated right humeral neck fracture. Admitted to medicine for syncope workup and management of humeral fracture. Ortho consulted and recommended conservative management. pain management following.  pt reccs [facility], pt accepted to [facility], completed 10 day covid isolation 10/19-10/29 and tested covid negative on 10/30. Pt is medically optimized for discharge.     VTE RISK FACTOR SCORE greater than or equal to 2 = PPX  Plan: RISK                                                Points  [] Previous VTE                                           3  [] Thrombophilia                                       2  [] Lower limb paralysis                              2   [] Current Cancer                                       2   [] D-dimer > twice upper limit of normal (>460 ng/ml) 2  [x] Immobilization > 24 hrs                        1  [] ICU/CCU stay > 24 hours                      1  [x] Age > 60                                                  1

## 2022-10-25 NOTE — PROGRESS NOTE ADULT - PROBLEM SELECTOR PLAN 3
-controlled  -cont Metoprolol with parameters   -mon BP -controlled  -cont Metoprolol with parameters   -monitor BP

## 2022-10-25 NOTE — PROGRESS NOTE ADULT - PROBLEM SELECTOR PLAN 1
-s/p fall at home   -CT showed displaced proximal humeral fracture  -Ortho following- conservative management recommended   - Start oxycodone 2.5 mg PO q 4 hours PRN   - Naproxen 375mg PO q8h x 3 days, with PPI.  - Renew Acetaminophen 1 gram PO q 8 hours x3D    - Continue Lidoderm 4% patch daily.   - Gabapentin 100mg PO BID.   -PT reccs: [facility]   -pain management following -s/p fall at home   -CT showed displaced proximal humeral fracture  -Ortho following- conservative management recommended   - oxycodone 2.5 mg PO q 4 hours PRN   - Naproxen 375mg PO q8h x 3 days, with PPI.  - Renew Acetaminophen 1 gram PO q 8 hours x3D    - Continue Lidoderm 4% patch daily.   - Gabapentin 100mg PO BID.   -PT reccs: [facility]   -pain management following

## 2022-10-25 NOTE — PROGRESS NOTE ADULT - PROBLEM SELECTOR PLAN 7
I spoke with Dr. Goff who is accepting of the final read on Head CT which is c/w anoxic encephalopathy. He agrees to withdraw Midodrine today and we will only continue Amio, enteral feeds and mech vent. He is hoping she will pass naturally without him having to withdraw the ventilator. continue to support him and Palliative care in put appreciated. -PT reccs [facility]  -pending family/pt choices   -cm following for placement -PT reccs [facility]  -cm following for acceptance and auth -PT reccs [facility]  -pt accepted to [facility], however will need total 10 day quarantine. (d/c 10/29).   -auth is requested.   -cm following

## 2022-10-25 NOTE — ADVANCED PRACTICE NURSE CONSULT - ASSESSMENT
This is a 82yr old female patient admitted for Syncope and Collapse, presenting with a Stage 1 Pressure Injury to the Coccyx, as evident by non-blanchable erythema. There is also evidence of ecchymosis to the R. Hip This is a 82yr old female patient admitted for Syncope and Collapse, presenting with blanchable erythema to the Coccyx. There is also evidence of ecchymosis to the R. Hip

## 2022-10-26 LAB
ANION GAP SERPL CALC-SCNC: 7 MMOL/L — SIGNIFICANT CHANGE UP (ref 5–17)
BUN SERPL-MCNC: 16 MG/DL — SIGNIFICANT CHANGE UP (ref 7–18)
CALCIUM SERPL-MCNC: 9.3 MG/DL — SIGNIFICANT CHANGE UP (ref 8.4–10.5)
CHLORIDE SERPL-SCNC: 109 MMOL/L — HIGH (ref 96–108)
CO2 SERPL-SCNC: 26 MMOL/L — SIGNIFICANT CHANGE UP (ref 22–31)
CREAT SERPL-MCNC: 0.57 MG/DL — SIGNIFICANT CHANGE UP (ref 0.5–1.3)
EGFR: 90 ML/MIN/1.73M2 — SIGNIFICANT CHANGE UP
GLUCOSE BLDC GLUCOMTR-MCNC: 115 MG/DL — HIGH (ref 70–99)
GLUCOSE BLDC GLUCOMTR-MCNC: 123 MG/DL — HIGH (ref 70–99)
GLUCOSE BLDC GLUCOMTR-MCNC: 131 MG/DL — HIGH (ref 70–99)
GLUCOSE BLDC GLUCOMTR-MCNC: 156 MG/DL — HIGH (ref 70–99)
GLUCOSE SERPL-MCNC: 124 MG/DL — HIGH (ref 70–99)
HCT VFR BLD CALC: 31.7 % — LOW (ref 34.5–45)
HGB BLD-MCNC: 10.3 G/DL — LOW (ref 11.5–15.5)
MCHC RBC-ENTMCNC: 30 PG — SIGNIFICANT CHANGE UP (ref 27–34)
MCHC RBC-ENTMCNC: 32.5 GM/DL — SIGNIFICANT CHANGE UP (ref 32–36)
MCV RBC AUTO: 92.4 FL — SIGNIFICANT CHANGE UP (ref 80–100)
NRBC # BLD: 0 /100 WBCS — SIGNIFICANT CHANGE UP (ref 0–0)
PLATELET # BLD AUTO: 449 K/UL — HIGH (ref 150–400)
POTASSIUM SERPL-MCNC: 4.4 MMOL/L — SIGNIFICANT CHANGE UP (ref 3.5–5.3)
POTASSIUM SERPL-SCNC: 4.4 MMOL/L — SIGNIFICANT CHANGE UP (ref 3.5–5.3)
RBC # BLD: 3.43 M/UL — LOW (ref 3.8–5.2)
RBC # FLD: 14.5 % — SIGNIFICANT CHANGE UP (ref 10.3–14.5)
SODIUM SERPL-SCNC: 142 MMOL/L — SIGNIFICANT CHANGE UP (ref 135–145)
WBC # BLD: 17.6 K/UL — HIGH (ref 3.8–10.5)
WBC # FLD AUTO: 17.6 K/UL — HIGH (ref 3.8–10.5)

## 2022-10-26 PROCEDURE — 71045 X-RAY EXAM CHEST 1 VIEW: CPT | Mod: 26

## 2022-10-26 PROCEDURE — 99232 SBSQ HOSP IP/OBS MODERATE 35: CPT

## 2022-10-26 RX ADMIN — GABAPENTIN 100 MILLIGRAM(S): 400 CAPSULE ORAL at 17:09

## 2022-10-26 RX ADMIN — Medication 1000 MILLIGRAM(S): at 13:50

## 2022-10-26 RX ADMIN — LIDOCAINE 1 PATCH: 4 CREAM TOPICAL at 20:00

## 2022-10-26 RX ADMIN — Medication 1000 MILLIGRAM(S): at 22:00

## 2022-10-26 RX ADMIN — Medication 1000 MILLIGRAM(S): at 13:25

## 2022-10-26 RX ADMIN — SENNA PLUS 2 TABLET(S): 8.6 TABLET ORAL at 21:01

## 2022-10-26 RX ADMIN — LIDOCAINE 1 PATCH: 4 CREAM TOPICAL at 11:53

## 2022-10-26 RX ADMIN — LIDOCAINE 1 PATCH: 4 CREAM TOPICAL at 23:00

## 2022-10-26 RX ADMIN — Medication 1000 MILLIGRAM(S): at 05:24

## 2022-10-26 RX ADMIN — Medication 600 MILLIGRAM(S): at 22:04

## 2022-10-26 RX ADMIN — GABAPENTIN 100 MILLIGRAM(S): 400 CAPSULE ORAL at 05:25

## 2022-10-26 RX ADMIN — LIDOCAINE 1 PATCH: 4 CREAM TOPICAL at 03:01

## 2022-10-26 RX ADMIN — Medication 1000 MILLIGRAM(S): at 21:02

## 2022-10-26 RX ADMIN — Medication 12.5 MILLIGRAM(S): at 05:25

## 2022-10-26 RX ADMIN — Medication 1000 MILLIGRAM(S): at 07:20

## 2022-10-26 NOTE — PROGRESS NOTE ADULT - SUBJECTIVE AND OBJECTIVE BOX
NP Note discussed with  Primary Attending    Patient is a 82y old  Female who presents with a chief complaint of Syncope and collapse     (26 Oct 2022 11:00)      INTERVAL HPI/OVERNIGHT EVENTS: no new complaints    MEDICATIONS  (STANDING):  acetaminophen     Tablet .. 1000 milliGRAM(s) Oral every 8 hours  atorvastatin 40 milliGRAM(s) Oral at bedtime  enoxaparin Injectable 40 milliGRAM(s) SubCutaneous every 12 hours  gabapentin 100 milliGRAM(s) Oral two times a day  insulin lispro (ADMELOG) corrective regimen sliding scale   SubCutaneous three times a day before meals  insulin lispro (ADMELOG) corrective regimen sliding scale   SubCutaneous at bedtime  lidocaine   4% Patch 1 Patch Transdermal daily  metoprolol succinate ER 12.5 milliGRAM(s) Oral daily  pantoprazole    Tablet 40 milliGRAM(s) Oral before breakfast  senna 2 Tablet(s) Oral at bedtime  sodium chloride 0.9%. 1000 milliLiter(s) (75 mL/Hr) IV Continuous <Continuous>    MEDICATIONS  (PRN):  aluminum hydroxide/magnesium hydroxide/simethicone Suspension 30 milliLiter(s) Oral every 4 hours PRN Dyspepsia  magnesium hydroxide Suspension 30 milliLiter(s) Oral daily PRN Constipation  melatonin 3 milliGRAM(s) Oral at bedtime PRN Insomnia  ondansetron Injectable 4 milliGRAM(s) IV Push every 8 hours PRN Nausea and/or Vomiting  oxyCODONE    IR 2.5 milliGRAM(s) Oral every 4 hours PRN Severe Pain (7 - 10)      __________________________________________________  REVIEW OF SYSTEMS:    CONSTITUTIONAL: No fever,   EYES: no acute visual disturbances  NECK: No pain or stiffness  RESPIRATORY: No cough; No shortness of breath  CARDIOVASCULAR: No chest pain, no palpitations  GASTROINTESTINAL: No pain. No nausea or vomiting; No diarrhea   NEUROLOGICAL: No headache or numbness, no tremors  MUSCULOSKELETAL: No joint pain, no muscle pain  GENITOURINARY: no dysuria, no frequency, no hesitancy  PSYCHIATRY: no depression , no anxiety  ALL OTHER  ROS negative        Vital Signs Last 24 Hrs  T(C): 36.4 (26 Oct 2022 12:52), Max: 36.8 (26 Oct 2022 05:19)  T(F): 97.6 (26 Oct 2022 12:52), Max: 98.2 (26 Oct 2022 05:19)  HR: 94 (26 Oct 2022 12:52) (85 - 95)  BP: 106/41 (26 Oct 2022 12:52) (106/41 - 146/67)  BP(mean): --  RR: 18 (26 Oct 2022 12:52) (18 - 18)  SpO2: 96% (26 Oct 2022 12:52) (94% - 96%)    Parameters below as of 26 Oct 2022 12:52  Patient On (Oxygen Delivery Method): room air        ________________________________________________  PHYSICAL EXAM:  GENERAL: NAD  HEENT: Normocephalic;  conjunctivae and sclerae clear; moist mucous membranes;   NECK : supple  CHEST/LUNG: Clear to auscultation bilaterally with good air entry   HEART: S1 S2  regular; no murmurs, gallops or rubs  ABDOMEN: Soft, Nontender, Nondistended; Bowel sounds present  EXTREMITIES: no cyanosis; no edema; no calf tenderness  SKIN: warm and dry; no rash  NERVOUS SYSTEM:  Awake and alert; Oriented  to place, person and time ; no new deficits    _________________________________________________  LABS:                        10.3   17.60 )-----------( 449      ( 26 Oct 2022 07:24 )             31.7     10-26    142  |  109<H>  |  16  ----------------------------<  124<H>  4.4   |  26  |  0.57    Ca    9.3      26 Oct 2022 07:24          CAPILLARY BLOOD GLUCOSE      POCT Blood Glucose.: 156 mg/dL (26 Oct 2022 11:39)  POCT Blood Glucose.: 131 mg/dL (26 Oct 2022 08:03)  POCT Blood Glucose.: 116 mg/dL (25 Oct 2022 21:28)  POCT Blood Glucose.: 113 mg/dL (25 Oct 2022 16:40)        RADIOLOGY & ADDITIONAL TESTS:  < from: CT Cervical Spine No Cont (10.19.22 @ 06:30) >  FINDINGS:    CT HEAD: No acute intracranial hemorrhage or suspicious extra-axial fluid   collection. Left frontal encephalomalacia with overlying craniectomy. No   focal edema or acute mass effect. No hydrocephalus or central herniation.   Moderate cerebral atrophy and mild chronic microvascular ischemic change.    Minimal paranasal sinus mucosal disease. Minimal opacification of the   mastoids, more prominent on the left. Scalp and image midfacial soft   tissues are unremarkable.    CT cervical spine:  The imaged skull base and craniovertebral alignment are intact. Vertebral   body heights are maintained. No definite listhesis, although motion   artifact partially degrades evaluation. Within the limitations of motion,   no acute fracture.    Degenerative disc disease most prominent at C5-6 where there is narrowing   of the central canal to 7 mm and moderate right foraminal stenosis.    Imaged lung apices are clear. Hemorrhage in the right axillary region. No   prevertebral edema.    IMPRESSION:  1. No acute intracranial CT abnormality.  2. No evidence ofacute cervical spine fracture or traumatic   malalignment, within the limitations of motion artifact.  3. Hemorrhage in the right axillary region, secondary to displaced   proximal humeral fracture demonstrated on the shoulder radiographs.    --- Endof Report ---    < end of copied text >  < from: CT Head No Cont (10.19.22 @ 06:30) >  FINDINGS:    CT HEAD: No acute intracranial hemorrhage or suspicious extra-axial fluid   collection. Left frontal encephalomalacia with overlying craniectomy. No   focal edema or acute mass effect. No hydrocephalus or central herniation.   Moderate cerebral atrophy and mild chronic microvascular ischemic change.    Minimal paranasal sinus mucosal disease. Minimal opacification of the   mastoids, more prominent on the left. Scalp and image midfacial soft   tissues are unremarkable.    CT cervical spine:  The imaged skull base and craniovertebral alignment are intact. Vertebral   body heights are maintained. No definite listhesis, although motion   artifact partially degrades evaluation. Within the limitations of motion,   no acute fracture.    Degenerative disc disease most prominent at C5-6 where there is narrowing   of the central canal to 7 mm and moderate right foraminal stenosis.    Imaged lung apices are clear. Hemorrhage in the right axillary region. No   prevertebral edema.    IMPRESSION:  1. No acute intracranial CT abnormality.  2. No evidence ofacute cervical spine fracture or traumatic   malalignment, within the limitations of motion artifact.  3. Hemorrhage in the right axillary region, secondary to displaced   proximal humeral fracture demonstrated on the shoulder radiographs.    --- Endof Report ---    < end of copied text >    Imaging  Reviewed:  YES    Consultant(s) Notes Reviewed:   YES      Plan of care was discussed with patient and /or primary care giver; all questions and concerns were addressed

## 2022-10-26 NOTE — DIETITIAN INITIAL EVALUATION ADULT - FACTORS AFF FOOD INTAKE
acute to chronic illnesses Covid-19+, fracture to humerus DM, advanced age./Uatsdin/ethnic/cultural/personal food preferences/other (specify)

## 2022-10-26 NOTE — DIETITIAN INITIAL EVALUATION ADULT - OTHER INFO
82 years old female admitting diagnoses syncope and collapse covid-19+ humeral fracture visited this morning, did not eat breakfast. As per pt food have no taste asked for some salt to have with food, pt was told that MD have her on a low salt diet, she stated she doesn't understand why. Offered misses DASH and pepper instead for some flavor. Pt states no weight loss UBW between 140-150 lbs, no N/V/D/C reported. Pt is for discharge to [facility].

## 2022-10-26 NOTE — PROGRESS NOTE ADULT - PROBLEM SELECTOR PLAN 7
-PT reccs [facility]  -pt accepted to [facility], however will need total 10 day quarantine. (d/c 10/29).   -auth is requested.   -cm following

## 2022-10-26 NOTE — PROGRESS NOTE ADULT - ASSESSMENT
82 year old female, from home, ambulates with cane, PMH HTN, HLD, DM, prior brain surgery (benign meningioma), prior cerebellar CVA, presents to the ED after fall at home. Found to be COVID + and X-Ray shows acute displaced angulated right humeral neck fracture. Admitted to medicine for syncope workup and management of humeral fracture. Ortho consulted and recommended conservative management. pain management following.  pt reccs [facility], pt accepted to [facility], however will need total 10 day quarantine (10/29). pending auth.

## 2022-10-26 NOTE — DIETITIAN INITIAL EVALUATION ADULT - PERTINENT MEDS FT
MEDICATIONS  (STANDING):  acetaminophen     Tablet .. 1000 milliGRAM(s) Oral every 8 hours  atorvastatin 40 milliGRAM(s) Oral at bedtime  enoxaparin Injectable 40 milliGRAM(s) SubCutaneous every 12 hours  gabapentin 100 milliGRAM(s) Oral two times a day  insulin lispro (ADMELOG) corrective regimen sliding scale   SubCutaneous three times a day before meals  insulin lispro (ADMELOG) corrective regimen sliding scale   SubCutaneous at bedtime  lidocaine   4% Patch 1 Patch Transdermal daily  metoprolol succinate ER 12.5 milliGRAM(s) Oral daily  pantoprazole    Tablet 40 milliGRAM(s) Oral before breakfast  senna 2 Tablet(s) Oral at bedtime  sodium chloride 0.9%. 1000 milliLiter(s) (75 mL/Hr) IV Continuous <Continuous>    MEDICATIONS  (PRN):  aluminum hydroxide/magnesium hydroxide/simethicone Suspension 30 milliLiter(s) Oral every 4 hours PRN Dyspepsia  magnesium hydroxide Suspension 30 milliLiter(s) Oral daily PRN Constipation  melatonin 3 milliGRAM(s) Oral at bedtime PRN Insomnia  ondansetron Injectable 4 milliGRAM(s) IV Push every 8 hours PRN Nausea and/or Vomiting  oxyCODONE    IR 2.5 milliGRAM(s) Oral every 4 hours PRN Severe Pain (7 - 10)

## 2022-10-26 NOTE — PROGRESS NOTE ADULT - PROBLEM SELECTOR PLAN 1
-s/p fall at home   -CT showed displaced proximal humeral fracture  -Ortho following- conservative management recommended   - oxycodone 2.5 mg PO q 4 hours PRN   - Naproxen 375mg PO q8h x 3 days, with PPI.  - Renew Acetaminophen 1 gram PO q 8 hours x3D    - Continue Lidoderm 4% patch daily.   - Gabapentin 100mg PO BID.   -PT reccs: [facility]   -pain management following

## 2022-10-26 NOTE — DIETITIAN INITIAL EVALUATION ADULT - PERTINENT LABORATORY DATA
10-26    142  |  109<H>  |  16  ----------------------------<  124<H>  4.4   |  26  |  0.57    Ca    9.3      26 Oct 2022 07:24    POCT Blood Glucose.: 131 mg/dL (10-26-22 @ 08:03)  A1C with Estimated Average Glucose Result: 6.1 % (10-19-22 @ 10:12)

## 2022-10-27 LAB
ANION GAP SERPL CALC-SCNC: 10 MMOL/L — SIGNIFICANT CHANGE UP (ref 5–17)
APPEARANCE UR: CLEAR — SIGNIFICANT CHANGE UP
BACTERIA # UR AUTO: ABNORMAL /HPF
BILIRUB UR-MCNC: NEGATIVE — SIGNIFICANT CHANGE UP
BUN SERPL-MCNC: 14 MG/DL — SIGNIFICANT CHANGE UP (ref 7–18)
CALCIUM SERPL-MCNC: 9.1 MG/DL — SIGNIFICANT CHANGE UP (ref 8.4–10.5)
CHLORIDE SERPL-SCNC: 107 MMOL/L — SIGNIFICANT CHANGE UP (ref 96–108)
CO2 SERPL-SCNC: 25 MMOL/L — SIGNIFICANT CHANGE UP (ref 22–31)
COLOR SPEC: YELLOW — SIGNIFICANT CHANGE UP
CREAT SERPL-MCNC: 0.54 MG/DL — SIGNIFICANT CHANGE UP (ref 0.5–1.3)
DIFF PNL FLD: ABNORMAL
EGFR: 91 ML/MIN/1.73M2 — SIGNIFICANT CHANGE UP
EPI CELLS # UR: ABNORMAL /HPF
GLUCOSE BLDC GLUCOMTR-MCNC: 101 MG/DL — HIGH (ref 70–99)
GLUCOSE BLDC GLUCOMTR-MCNC: 115 MG/DL — HIGH (ref 70–99)
GLUCOSE BLDC GLUCOMTR-MCNC: 121 MG/DL — HIGH (ref 70–99)
GLUCOSE BLDC GLUCOMTR-MCNC: 124 MG/DL — HIGH (ref 70–99)
GLUCOSE SERPL-MCNC: 114 MG/DL — HIGH (ref 70–99)
GLUCOSE UR QL: NEGATIVE — SIGNIFICANT CHANGE UP
HCT VFR BLD CALC: 32 % — LOW (ref 34.5–45)
HGB BLD-MCNC: 10.1 G/DL — LOW (ref 11.5–15.5)
KETONES UR-MCNC: ABNORMAL
LEUKOCYTE ESTERASE UR-ACNC: ABNORMAL
MCHC RBC-ENTMCNC: 29.8 PG — SIGNIFICANT CHANGE UP (ref 27–34)
MCHC RBC-ENTMCNC: 31.6 GM/DL — LOW (ref 32–36)
MCV RBC AUTO: 94.4 FL — SIGNIFICANT CHANGE UP (ref 80–100)
NITRITE UR-MCNC: NEGATIVE — SIGNIFICANT CHANGE UP
NRBC # BLD: 0 /100 WBCS — SIGNIFICANT CHANGE UP (ref 0–0)
PH UR: 7 — SIGNIFICANT CHANGE UP (ref 5–8)
PLATELET # BLD AUTO: 459 K/UL — HIGH (ref 150–400)
POTASSIUM SERPL-MCNC: 4 MMOL/L — SIGNIFICANT CHANGE UP (ref 3.5–5.3)
POTASSIUM SERPL-SCNC: 4 MMOL/L — SIGNIFICANT CHANGE UP (ref 3.5–5.3)
PROT UR-MCNC: 30 MG/DL
RBC # BLD: 3.39 M/UL — LOW (ref 3.8–5.2)
RBC # FLD: 14.6 % — HIGH (ref 10.3–14.5)
RBC CASTS # UR COMP ASSIST: SIGNIFICANT CHANGE UP /HPF (ref 0–2)
SODIUM SERPL-SCNC: 142 MMOL/L — SIGNIFICANT CHANGE UP (ref 135–145)
SP GR SPEC: 1.01 — SIGNIFICANT CHANGE UP (ref 1.01–1.02)
UROBILINOGEN FLD QL: 4
WBC # BLD: 16.3 K/UL — HIGH (ref 3.8–10.5)
WBC # FLD AUTO: 16.3 K/UL — HIGH (ref 3.8–10.5)
WBC UR QL: ABNORMAL /HPF (ref 0–5)

## 2022-10-27 PROCEDURE — 99233 SBSQ HOSP IP/OBS HIGH 50: CPT

## 2022-10-27 PROCEDURE — 99232 SBSQ HOSP IP/OBS MODERATE 35: CPT

## 2022-10-27 RX ORDER — ACETAMINOPHEN 500 MG
1000 TABLET ORAL EVERY 8 HOURS
Refills: 0 | Status: DISCONTINUED | OUTPATIENT
Start: 2022-10-27 | End: 2022-10-31

## 2022-10-27 RX ADMIN — SENNA PLUS 2 TABLET(S): 8.6 TABLET ORAL at 21:29

## 2022-10-27 RX ADMIN — Medication 1000 MILLIGRAM(S): at 07:07

## 2022-10-27 RX ADMIN — Medication 1000 MILLIGRAM(S): at 16:35

## 2022-10-27 RX ADMIN — Medication 12.5 MILLIGRAM(S): at 05:43

## 2022-10-27 RX ADMIN — Medication 1000 MILLIGRAM(S): at 05:43

## 2022-10-27 RX ADMIN — GABAPENTIN 100 MILLIGRAM(S): 400 CAPSULE ORAL at 17:06

## 2022-10-27 RX ADMIN — Medication 1000 MILLIGRAM(S): at 14:31

## 2022-10-27 RX ADMIN — LIDOCAINE 1 PATCH: 4 CREAM TOPICAL at 19:55

## 2022-10-27 RX ADMIN — Medication 600 MILLIGRAM(S): at 05:44

## 2022-10-27 RX ADMIN — ATORVASTATIN CALCIUM 40 MILLIGRAM(S): 80 TABLET, FILM COATED ORAL at 21:29

## 2022-10-27 RX ADMIN — LIDOCAINE 1 PATCH: 4 CREAM TOPICAL at 11:32

## 2022-10-27 RX ADMIN — GABAPENTIN 100 MILLIGRAM(S): 400 CAPSULE ORAL at 05:43

## 2022-10-27 NOTE — PROGRESS NOTE ADULT - SUBJECTIVE AND OBJECTIVE BOX
NP Note discussed with  Primary Attending    Patient is a 82y old  Female who presents with a chief complaint of fall, right humerus fracture (27 Oct 2022 11:01)      INTERVAL HPI/OVERNIGHT EVENTS: no new complaints    MEDICATIONS  (STANDING):  atorvastatin 40 milliGRAM(s) Oral at bedtime  enoxaparin Injectable 40 milliGRAM(s) SubCutaneous every 12 hours  gabapentin 100 milliGRAM(s) Oral two times a day  guaiFENesin  milliGRAM(s) Oral every 12 hours  insulin lispro (ADMELOG) corrective regimen sliding scale   SubCutaneous three times a day before meals  insulin lispro (ADMELOG) corrective regimen sliding scale   SubCutaneous at bedtime  lidocaine   4% Patch 1 Patch Transdermal daily  metoprolol succinate ER 12.5 milliGRAM(s) Oral daily  pantoprazole    Tablet 40 milliGRAM(s) Oral before breakfast  senna 2 Tablet(s) Oral at bedtime  sodium chloride 0.9%. 1000 milliLiter(s) (75 mL/Hr) IV Continuous <Continuous>    MEDICATIONS  (PRN):  acetaminophen     Tablet .. 1000 milliGRAM(s) Oral every 8 hours PRN Moderate Pain (4 - 6)  aluminum hydroxide/magnesium hydroxide/simethicone Suspension 30 milliLiter(s) Oral every 4 hours PRN Dyspepsia  magnesium hydroxide Suspension 30 milliLiter(s) Oral daily PRN Constipation  melatonin 3 milliGRAM(s) Oral at bedtime PRN Insomnia  ondansetron Injectable 4 milliGRAM(s) IV Push every 8 hours PRN Nausea and/or Vomiting  oxyCODONE    IR 2.5 milliGRAM(s) Oral every 4 hours PRN Severe Pain (7 - 10)      __________________________________________________  REVIEW OF SYSTEMS:    CONSTITUTIONAL: No fever,   EYES: no acute visual disturbances  NECK: No pain or stiffness  RESPIRATORY: cough; No shortness of breath  CARDIOVASCULAR: No chest pain, no palpitations  GASTROINTESTINAL: No pain. No nausea or vomiting; No diarrhea   NEUROLOGICAL: No headache or numbness, no tremors  MUSCULOSKELETAL: No joint pain, no muscle pain  GENITOURINARY: no dysuria, no frequency, no hesitancy  PSYCHIATRY: no depression , no anxiety  ALL OTHER  ROS negative        Vital Signs Last 24 Hrs  T(C): 37.6 (27 Oct 2022 14:09), Max: 37.6 (27 Oct 2022 14:09)  T(F): 99.6 (27 Oct 2022 14:09), Max: 99.6 (27 Oct 2022 14:09)  HR: 88 (27 Oct 2022 15:16) (78 - 89)  BP: 116/48 (27 Oct 2022 11:44) (114/58 - 117/52)  BP(mean): --  RR: 18 (27 Oct 2022 11:44) (18 - 19)  SpO2: 94% (27 Oct 2022 15:16) (94% - 97%)    Parameters below as of 27 Oct 2022 15:16  Patient On (Oxygen Delivery Method): room air        ________________________________________________  PHYSICAL EXAM:  GENERAL: NAD  HEENT: Normocephalic;  conjunctivae and sclerae clear; moist mucous membranes;   NECK : supple  CHEST/LUNG: Clear to auscultation bilaterally with good air entry   HEART: S1 S2  regular; no murmurs, gallops or rubs  ABDOMEN: Soft, Nontender, Nondistended; Bowel sounds present  EXTREMITIES: no cyanosis; no edema; no calf tenderness  SKIN: warm and dry; no rash  NERVOUS SYSTEM:  Awake and alert; Oriented  to place, person and time ; no new deficits    _________________________________________________  LABS:                        10.1   16.30 )-----------( 459      ( 27 Oct 2022 06:32 )             32.0     10-27    142  |  107  |  14  ----------------------------<  114<H>  4.0   |  25  |  0.54    Ca    9.1      27 Oct 2022 06:32          CAPILLARY BLOOD GLUCOSE      POCT Blood Glucose.: 121 mg/dL (27 Oct 2022 11:28)  POCT Blood Glucose.: 115 mg/dL (27 Oct 2022 07:40)  POCT Blood Glucose.: 123 mg/dL (26 Oct 2022 20:51)  POCT Blood Glucose.: 115 mg/dL (26 Oct 2022 17:03)        RADIOLOGY & ADDITIONAL TESTS:  < from: Xray Chest 1 View- PORTABLE-Routine (Xray Chest 1 View- PORTABLE-Routine .) (10.26.22 @ 17:46) >  INTERPRETATION:  Chest one view    HISTORY: Pneumonia    COMPARISON STUDY: 10/19/2022    Rotated expiratory view of the chest shows the heart to be normal in   size. The lungs are clear and there is no evidence of pneumothorax nor   pleural effusion.    IMPRESSION:  No active pulmonary disease.        Thank you for the courtesy of this referral.    --- End of Report ---    < end of copied text >  < from: CT Cervical Spine No Cont (10.19.22 @ 06:30) >  FINDINGS:    CT HEAD: No acute intracranial hemorrhage or suspicious extra-axial fluid   collection. Left frontal encephalomalacia with overlying craniectomy. No   focal edema or acute mass effect. No hydrocephalus or central herniation.   Moderate cerebral atrophy and mild chronic microvascular ischemic change.    Minimal paranasal sinus mucosal disease. Minimal opacification of the   mastoids, more prominent on the left. Scalp and image midfacial soft   tissues are unremarkable.    CT cervical spine:  The imaged skull base and craniovertebral alignment are intact. Vertebral   body heights are maintained. No definite listhesis, although motion   artifact partially degrades evaluation. Within the limitations of motion,   no acute fracture.    Degenerative disc disease most prominent at C5-6 where there is narrowing   of the central canal to 7 mm and moderate right foraminal stenosis.    Imaged lung apices are clear. Hemorrhage in the right axillary region. No   prevertebral edema.    IMPRESSION:  1. No acute intracranial CT abnormality.  2. No evidence ofacute cervical spine fracture or traumatic   malalignment, within the limitations of motion artifact.  3. Hemorrhage in the right axillary region, secondary to displaced   proximal humeral fracture demonstrated on the shoulder radiographs.    --- Endof Report ---    < end of copied text >  < from: Xray Humerus, Right (10.19.22 @ 04:48) >    INTERPRETATION:  Right humerus    HISTORY: Shoulder fracture     Two views of the right humerus show no evidence of fracture nor   destructive change below the surgical neck. The distal joint spaces are   maintained.    IMPRESSION: No acute bony pathology distally.        Thank you for this referral.    --- End of Report ---    < end of copied text >  < from: Xray Shoulder 2 Views, Right (10.19.22 @ 04:48) >    HISTORY: Patient fell     Three views of the right shoulder show angulated and displaced   comminuted fracture of the surgical neck. The joint spaces are maintained.    IMPRESSION: Surgical neck fracture.    The above findings correspond to a note entered into the hospital's image   software system by the emergency department staff at the time of the   study.     Thank you for this referral.    --- End of Report ---    < end of copied text >  < from: Xray Chest 1 View AP/PA (10.19.22 @ 04:48) >  INTERPRETATION:  CLINICAL INDICATION: 82 years  Female with fall.    COMPARISON: None    AP view of the chest demonstrates the lungs to be clear. There is no   pleural effusion. The pulmonary vasculature is normal. There is no   pneumothorax.    The heart is normal in size. The mediastinum cannot be assessed. There is   no hilar mass.    There is a displaced angulated right humeral neck fracture. The shaft is   displaced medial to the humeral head with approximately 2 cm overlap of   fracture fragments. Mild thoracic degenerative changes and osteopenia are   present.    IMPRESSION:    No acute infiltrate.    Displaced angulated right humeral neck fracture with overlap.    --- End of Report ---    < end of copied text >    Imaging  Reviewed:  YES    Consultant(s) Notes Reviewed:   YES      Plan of care was discussed with patient and /or primary care giver; all questions and concerns were addressed

## 2022-10-27 NOTE — PROGRESS NOTE ADULT - NS ATTEND AMEND GEN_ALL_CORE FT
Patient is seating in a chair. Still c/o pain in shoulder.  c/o feeling chilled.     Alert, cooperative woman in NAD  Vital Signs Last 24 Hrs  T(C): 37.6 (27 Oct 2022 14:09), Max: 37.6 (27 Oct 2022 14:09)  T(F): 99.6 (27 Oct 2022 14:09), Max: 99.6 (27 Oct 2022 14:09)  HR: 88 (27 Oct 2022 15:16) (78 - 89)  BP: 116/48 (27 Oct 2022 11:44) (114/58 - 117/52)  BP(mean): --  RR: 18 (27 Oct 2022 11:44) (18 - 19)  SpO2: 94% (27 Oct 2022 15:16) (94% - 97%)    Parameters below as of 27 Oct 2022 15:16  Patient On (Oxygen Delivery Method): room air    Lungs, clear  Cor, RRR  Abdomen, soft  right arm is in a sling  Neurological, intact    CAPILLARY BLOOD GLUCOSE                          10.1   16.30 )-----------( 459      ( 27 Oct 2022 06:32 )             32.0     IMP:  fracture of right humerus after a mechanical fall          COVID - 19 infection without hypoxia          hypertension          DM  Plan: Continue analgesia and support          Refer to [facility] Patient is seating in a chair. Still c/o pain in shoulder.  c/o feeling chilled.     Alert, cooperative woman in NAD  Vital Signs Last 24 Hrs  T(C): 37.6 (27 Oct 2022 14:09), Max: 37.6 (27 Oct 2022 14:09)  T(F): 99.6 (27 Oct 2022 14:09), Max: 99.6 (27 Oct 2022 14:09)  HR: 88 (27 Oct 2022 15:16) (78 - 89)  BP: 116/48 (27 Oct 2022 11:44) (114/58 - 117/52)  BP(mean): --  RR: 18 (27 Oct 2022 11:44) (18 - 19)  SpO2: 94% (27 Oct 2022 15:16) (94% - 97%)    Parameters below as of 27 Oct 2022 15:16  Patient On (Oxygen Delivery Method): room air    Lungs, clear  Cor, RRR  Abdomen, soft  right arm is in a sling  Neurological, intact    CAPILLARY BLOOD GLUCOSE                          10.1   16.30 )-----------( 459      ( 27 Oct 2022 06:32 )             32.0   10-27    142  |  107  |  14  ----------------------------<  114<H>  4.0   |  25  |  0.54    Ca    9.1      27 Oct 2022 06:32  < from: Xray Chest 1 View- PORTABLE-Routine (Xray Chest 1 View- PORTABLE-Routine .) (10.26.22 @ 17:46) >    IMPRESSION:  No active pulmonary disease.    < end of copied text >    IMP:  fracture of right humerus after a mechanical fall          COVID - 19 infection without hypoxia          hypertension          DM          chills, leukocytosis, low grade fever, r/o bacterial infection, possibly urine  Plan: Continue analgesia and support          U/A and culture          refer to [facility]

## 2022-10-27 NOTE — PROGRESS NOTE ADULT - ASSESSMENT
Confidential Drug Utilization Report  Search Terms: Yarelis Cordero, 1940Search Date: 10/21/2022 15:03:11 PM  The Drug Utilization Report below displays all of the controlled substance prescriptions, if any, that your patient has filled in the last twelve months. The information displayed on this report is compiled from pharmacy submissions to the Department, and accurately reflects the information as submitted by the pharmacies.    This report was requested by: Massiel Mckoy | Reference #: [id number]    There are no results for the search terms that you entered.

## 2022-10-27 NOTE — PROGRESS NOTE ADULT - PROBLEM SELECTOR PLAN 7
-f/u repeat infectious work  -pt accepted to [facility], however will need total 10 day quarantine. (d/c 10/29).   -auth is requested.   -cm following

## 2022-10-27 NOTE — PROGRESS NOTE ADULT - SUBJECTIVE AND OBJECTIVE BOX
SOB, hypoxia    72yo F, PMH of HTN. c/o chronic left shoulder pain x 3 years associated with numbness/tingling. The patient failed conservative therapies. Admitted  for elective left reverse total shoulder arthroplasty, POD-2. Medicine consulted for hypoxia and sob.    PAST MEDICAL & SURGICAL HISTORY:  HTN (hypertension)  History of total right knee replacement  History of right hip replacement    Social: Denies etoh, smoking, drugs.     Allergies: NKDA    Home Medications:  acetaminophen 325 mg oral tablet: 2 tab(s) orally every 6 hours, as needed, mild pain (1-3) or fever temperature 100.3 or higher. Do not take more than 3,250mg in a day   (07 Jun 2019 16:21)  atenolol 25 mg oral tablet: 1 tab(s) orally once a day (at bedtime) (05 Jun 2019 10:28)  docusate sodium 100 mg oral capsule: 1 cap(s) orally 3 times a day (05 Jun 2019 10:28)  furosemide 20 mg oral tablet: 1 tab(s) orally once a day (05 Jun 2019 10:28)  gabapentin 300 mg oral capsule: 1 cap(s) orally 3 times a day (05 Jun 2019 10:28)  losartan 50 mg oral tablet: 1 tab(s) orally once a day  Losartan potassium (05 Jun 2019 10:28)  pantoprazole 40 mg oral delayed release tablet: 1 tab(s) orally once a day (05 Jun 2019 10:28)  senna oral tablet: 2 tab(s) orally once a day (at bedtime), As needed, Constipation (05 Jun 2019 10:28)  sertraline 50 mg oral tablet: 1 tab(s) orally once a day (05 Jun 2019 10:28)  simvastatin 10 mg oral tablet: 1 tab(s) orally once a day (at bedtime) (05 Jun 2019 10:28)  Vitamin D2: 10,000 IU take as instructed by your prescribing provider (06 Jun 2019 09:39)   Source of information: MARTELL GEE, Chart review  Patient language: English  : n/a    HPI:  82 year old female, from home, ambulates with cane, PMH HTN, HLD, DM, prior brain surgery (benign meningioma), prior cerebellar CVA, presents to the ED after fall at home. Pt states that 2 nights ago, she was walking to her bedroom and felt as if the distance was further. She states it was dark and she fell. Pt was not able to get up on her own. She states she felt vertigo, "the room spinning" after she fell and is not sure if she lost consciousness or not. She states she has 6/10 pain in her right shoulder. Her neighbor called EMS the next day when she checked in on her. Pt states that she recently traveled back from [facility] a couple weeks ago. She also recently stopped taking Ozempic 1 week ago due to symptoms of nausea and diarrhea which she attributed to the medication. Pt found to be COVID+ in the ED. She states she has post-nasal drip and minor chills but otherwise denies headache, fever, cough, shortness of breath, sore throat, chest pain, palpitations, abdominal pain, diarrhea, constipation or dysuria.    In ED: vitals /69, HR 96, Temp 98.1F, 98% on RA  CT Head and Cervical Spine shows no acute intracranial CT abnormality. No evidence of acute cervical spine fracture or traumatic malalignment, within the limitations of motion artifact. Hemorrhage in the right axillary region, secondary to displaced proximal humeral fracture demonstrated on the shoulder radiographs.  s/p 500cc IVF NS bolus in ED (19 Oct 2022 09:24)      Pt is admitted for s/p Fall. Pain consulted for Rt shoulder/arm pain. Pt with displaced proximal humeral fracture showed on CT. Pt seen and examined at bedside. + covid 10/19. Airborne/ contact precautions maintained. Pt laying in bed, reports right shoulder pain score 7/10 and tolerable SCALE USED: (1-10 VNRS). Reports she received tylenol this morning and does not require additional pain medications at this time. Pt describes pain as sharp, radiating from R shoulder to R arm,  alleviated by pain medication and exacerbated by movement. R arm sling in place. Pt tolerating PO diet. Denies lethargy, nausea, vomiting, itchiness. Reports hx constipation, last BM 10/26 after enema. + frequent cough. Patient stated goal for pain control: to be able to take deep breaths, get out of bed to chair and ambulate with tolerable pain control. Pt is able to get out of bed to chair with tolerable pain. Pt reports taking Tylenol 500 mg po for mild pain and headaches at home as needed. Patient stating living alone at home, baseline uses cane for walking and when outside uses walker. Patient seen by Ortho team, will treat Fx conservatively. Plan to be discharged to [facility] on 10/29.     PAST MEDICAL & SURGICAL HISTORY:  Brain Tumor (Benign)    Nephrolithiasis    Headache  undiagnosed for years- exacerbation treated with Prednisone    Hyperlipidemia    Diabetes    Ischemic stroke    HTN (hypertension)    DM (diabetes mellitus)    HLD (hyperlipidemia)    Brain Tumor (Benign)    H/O brain surgery  benign    FAMILY HISTORY:  Family history of cardiomyopathy    FH: heart attack (Father)    FH: heart disease (Child)    Social History:  Pt lives at home alone, no HHA. Denies tobacco, alcohol or illicit drug use. (19 Oct 2022 09:24)   [x] Denies ETOH use, illicit drug use and smoking    Allergies    penicillin (Unknown)- rash    MEDICATIONS  (STANDING):  atorvastatin 40 milliGRAM(s) Oral at bedtime  enoxaparin Injectable 40 milliGRAM(s) SubCutaneous every 12 hours  gabapentin 100 milliGRAM(s) Oral two times a day  guaiFENesin  milliGRAM(s) Oral every 12 hours  insulin lispro (ADMELOG) corrective regimen sliding scale   SubCutaneous three times a day before meals  insulin lispro (ADMELOG) corrective regimen sliding scale   SubCutaneous at bedtime  lidocaine   4% Patch 1 Patch Transdermal daily  metoprolol succinate ER 12.5 milliGRAM(s) Oral daily  pantoprazole    Tablet 40 milliGRAM(s) Oral before breakfast  senna 2 Tablet(s) Oral at bedtime  sodium chloride 0.9%. 1000 milliLiter(s) (75 mL/Hr) IV Continuous <Continuous>    MEDICATIONS  (PRN):  acetaminophen     Tablet .. 1000 milliGRAM(s) Oral every 8 hours PRN Moderate Pain (4 - 6)  aluminum hydroxide/magnesium hydroxide/simethicone Suspension 30 milliLiter(s) Oral every 4 hours PRN Dyspepsia  magnesium hydroxide Suspension 30 milliLiter(s) Oral daily PRN Constipation  melatonin 3 milliGRAM(s) Oral at bedtime PRN Insomnia  ondansetron Injectable 4 milliGRAM(s) IV Push every 8 hours PRN Nausea and/or Vomiting  oxyCODONE    IR 2.5 milliGRAM(s) Oral every 4 hours PRN Severe Pain (7 - 10)      Vital Signs Last 24 Hrs  T(C): 36.7 (27 Oct 2022 05:35), Max: 37.2 (26 Oct 2022 20:01)  T(F): 98.1 (27 Oct 2022 05:35), Max: 99 (26 Oct 2022 20:01)  HR: 83 (27 Oct 2022 05:35) (78 - 95)  BP: 114/58 (27 Oct 2022 05:35) (106/41 - 117/52)  BP(mean): --  RR: 19 (27 Oct 2022 05:35) (18 - 19)  SpO2: 97% (27 Oct 2022 05:35) (94% - 97%)    Parameters below as of 27 Oct 2022 05:35  Patient On (Oxygen Delivery Method): room air      COVID-19 PCR: Detected (24 Oct 2022 18:24)  COVID-19 PCR: Detected (19 Oct 2022 00:13)    LABS: Reviewed                          10.1   16.30 )-----------( 459      ( 27 Oct 2022 06:32 )             32.0     10-27    142  |  107  |  14  ----------------------------<  114<H>  4.0   |  25  |  0.54    Ca    9.1      27 Oct 2022 06:32    CAPILLARY BLOOD GLUCOSE    POCT Blood Glucose.: 115 mg/dL (27 Oct 2022 07:40)  POCT Blood Glucose.: 123 mg/dL (26 Oct 2022 20:51)  POCT Blood Glucose.: 115 mg/dL (26 Oct 2022 17:03)  POCT Blood Glucose.: 156 mg/dL (26 Oct 2022 11:39)     Radiology: Reviewed.   < from: Xray Humerus, Right (10.19.22 @ 04:48) >    ACC: [id number] EXAM:  XR HUMERUS MIN 2 VIEWS RT                          PROCEDURE DATE:  10/19/2022          INTERPRETATION:  Right humerus    HISTORY: Shoulder fracture     Two views of the right humerus show no evidence of fracture nor   destructive change below the surgical neck. The distal joint spaces are   maintained.    IMPRESSION: No acute bony pathology distally.        Thank you for this referral.    --- End of Report ---            YOLETTE MALIN MD; Attending Interventional Radiologist  This document has been electronically signed. Oct 21 2022  9:24AM    < end of copied text >    < from: Xray Shoulder 2 Views, Right (10.19.22 @ 04:48) >    ACC: [id number] EXAM:  XR SHOULDER COMP MIN 2V RT                          PROCEDURE DATE:  10/19/2022          INTERPRETATION:  Right shoulder    HISTORY: Patient fell     Three views of the right shoulder show angulated and displaced   comminuted fracture of the surgical neck. The joint spaces are maintained.    IMPRESSION: Surgical neck fracture.    The above findings correspond to a note entered into the hospital's image   software system by the emergency department staff at the time of the   study.     Thank you for this referral.    --- End of Report ---            YOLETTE MALIN MD; Attending Interventional Radiologist  This document has been electronically signed. Oct 21 2022  9:23AM    < end of copied text >      ACC: [id number] EXAM:  CT BRAIN                        ACC: [id number] EXAM:  CT CERVICAL SPINE                          PROCEDURE DATE:  10/19/2022      INTERPRETATION:  EXAMINATION: CT CERVICAL SPINE, CT HEAD    DATE: 10/19/2022 6:30 AM    INDICATION: Trauma    COMPARISON: None available    TECHNIQUE: Thin section noncontrast axial images were obtained through   the head and cervical spine. Multiplanar reformats submitted.    FINDINGS:    CT HEAD: No acute intracranial hemorrhage or suspicious extra-axial fluid   collection. Left frontal encephalomalacia with overlying craniectomy. No   focal edema or acute mass effect. No hydrocephalus or central herniation.   Moderate cerebral atrophy and mild chronic microvascular ischemic change.    Minimal paranasal sinus mucosal disease. Minimal opacification of the   mastoids, more prominent on the left. Scalp and image midfacial soft   tissues are unremarkable.    CT cervical spine:  The imaged skull base and craniovertebral alignment are intact. Vertebral   body heights are maintained. No definite listhesis, although motion   artifact partially degrades evaluation. Within the limitations of motion,   no acute fracture.    Degenerative disc disease most prominent at C5-6 where there is narrowing   of the central canal to 7 mm and moderate right foraminal stenosis.    Imaged lung apices are clear. Hemorrhage in the right axillary region. No   prevertebral edema.    IMPRESSION:  1. No acute intracranial CT abnormality.  2. No evidence ofacute cervical spine fracture or traumatic   malalignment, within the limitations of motion artifact.  3. Hemorrhage in the right axillary region, secondary to displaced   proximal humeral fracture demonstrated on the shoulder radiographs.    --- Endof Report ---    CRISTOBAL LEON MD; Attending Radiologist  This document has been electronically signed. Oct 19 2022  7:05AM  [facility]: [id number] EXAM:  XR HUMERUS MIN 2 VIEWS RT                          PROCEDURE DATE:  10/19/2022      INTERPRETATION:  Right humerus    HISTORY: Shoulder fracture     Two views of the right humerus show no evidence of fracture nor   destructive change below the surgical neck. The distal joint spaces are   maintained.    IMPRESSION: No acute bony pathology distally.    Thank you for this referral.    --- End of Report ---    YOLETTE MALIN MD; Attending Interventional Radiologist  This document has been electronically signed. Oct 21 2022  9:24AM  CC: [id number] EXAM:  XR SHOULDER COMP MIN 2V RT                          PROCEDURE DATE:  10/19/2022      INTERPRETATION:  Right shoulder    HISTORY: Patient fell     Three views of the right shoulder show angulated and displaced   comminuted fracture of the surgical neck. The joint spaces are maintained.    IMPRESSION: Surgical neck fracture.    The above findings correspond to a note entered into the hospital's image   software system by the emergency department staff at the time of the   study.     Thank you for this referral.    --- End of Report ---    YOLETTE MALIN MD; Attending Interventional Radiologist  This document has been electronically signed. Oct 21 2022  9:23AM    ORT Score -   Family Hx of substance abuse	Female	      Male  Alcohol 	                                           1                     3  Illegal drugs	                                   2                     3  Rx drugs                                           4 	                  4  Personal Hx of substance abuse		  Alcohol 	                                          3	                  3  Illegal drugs                                     4	                  4  Rx drugs                                            5 	                  5  Age between 16- 45 years	           1                     1  hx preadolescent sexual abuse	   3 	                  0  Psychological disease		  ADD, OCD, bipolar, schizophrenia   2	          2  Depression                                           1 	          1  Total: 0    a score of 3 or lower indicates low risk for opioid abuse		  a score of 4-7 indicates moderate risk for opioid abuse		  a score of 8 or higher indicates high risk for opioid abuse  	  REVIEW OF SYSTEMS:  CONSTITUTIONAL: No fever or fatigue  RESPIRATORY: + frequent cough, No wheezing, chills or hemoptysis; + occasional shortness of breath   CARDIOVASCULAR: No chest pain, palpitations, dizziness, or leg swelling  GASTROINTESTINAL: + loss of appetite, + decreased PO intake. No abdominal or epigastric pain. No nausea, vomiting; No diarrhea, + hx constipation.   GENITOURINARY: No dysuria, frequency, hematuria, retention or incontinence  MUSCULOSKELETAL: + R shoulder/arm joint pain No swelling; + decreased ROM right arm d/t fx; no upper or lower motor strength weakness, no saddle anesthesia, bowel/bladder incontinence, + hx falls   NEURO: No headaches, No numbness/tingling b/l LE    PHYSICAL EXAM:  GENERAL:  Alert & Oriented X4, cooperative, NAD. Speech is clear.   RESPIRATORY: Respirations even and unlabored. Clear to auscultation bilaterally; No rales, rhonchi, wheezing, or rubs; + frequent dry cough   CARDIOVASCULAR: Normal S1/S2, regular rate and rhythm; No murmurs, rubs, or gallops. No JVD.   GASTROINTESTINAL:  Soft, Nontender, Nondistended; Bowel sounds present  PERIPHERAL VASCULAR:  Extremities warm without edema. 2+ Peripheral Pulses, No cyanosis, No calf tenderness  MUSCULOSKELETAL: Motor Strength 4/5 B/L upper and 5/5 lower extremities; + ROM decreased RUE; negative SLR; + tenderness on palpation of R shoulder and upper arm + right shoulder in sling   SKIN: Warm, dry, intact. No rashes, lesions, scars or wounds. R upper shoulder/arm with ecchymosis. R arm sling in place.    Risk factors associated with adverse outcomes related to opioid treatment  [ ]  Concurrent benzodiazepine use  [ ]  History/ Active substance use or alcohol use disorder  [ ] Psychiatric co-morbidity  [ ] Sleep apnea  [ ] COPD  [ ] BMI> 35  [ ] Liver dysfunction  [ ] Renal dysfunction  [ ] CHF  [ ] Smoker  [x]  Age > 60 years    [x)  [facility]  Reviewed and Copied to Chart. See below.    Plan of care and goal oriented pain management treatment options were discussed with patient and /or primary care giver; all questions and concerns were addressed and care was aligned with patient's wishes.    Educated patient on goal oriented pain management treatment options     10-27-22 @ 11:01

## 2022-10-27 NOTE — PROGRESS NOTE ADULT - PROBLEM SELECTOR PLAN 1
Pt with pain which is somatic in nature due to displaced proximal humeral fracture. Patient seen by Ortho team, treat Fx conservatively. High risk medications reviewed. Avoid polypharmacy. Avoid IV opioids. Avoid NSAIDs and benzodiazepines. Non-pharmacological sleep aides initiated. Non-opioid medications and non-pharmacological pain management measures initiated.  Opioid pain recommendations   - Continue oxycodone 2.5 mg PO q 4 hours PRN for severe pain  Non-opioid pain recommendations   - Naproxen 375mg PO q8h x 3 days completed   - Acetaminophen 1 gram PO q 8 hours for 3 days completed, now PRN moderate pain. Monitor LFTs  - Continue Lidoderm 4% patch daily.   - Continue Gabapentin 100mg PO BID.   Bowel Regimen  - Continue Miralax 17G PO daily  - Continue Senna 2 tablets at bedtime for constipation

## 2022-10-28 DIAGNOSIS — D72.829 ELEVATED WHITE BLOOD CELL COUNT, UNSPECIFIED: ICD-10-CM

## 2022-10-28 DIAGNOSIS — R05.9 COUGH, UNSPECIFIED: ICD-10-CM

## 2022-10-28 LAB
ANION GAP SERPL CALC-SCNC: 10 MMOL/L — SIGNIFICANT CHANGE UP (ref 5–17)
BUN SERPL-MCNC: 16 MG/DL — SIGNIFICANT CHANGE UP (ref 7–18)
CALCIUM SERPL-MCNC: 9.3 MG/DL — SIGNIFICANT CHANGE UP (ref 8.4–10.5)
CHLORIDE SERPL-SCNC: 104 MMOL/L — SIGNIFICANT CHANGE UP (ref 96–108)
CO2 SERPL-SCNC: 24 MMOL/L — SIGNIFICANT CHANGE UP (ref 22–31)
CREAT SERPL-MCNC: 0.6 MG/DL — SIGNIFICANT CHANGE UP (ref 0.5–1.3)
EGFR: 89 ML/MIN/1.73M2 — SIGNIFICANT CHANGE UP
GLUCOSE BLDC GLUCOMTR-MCNC: 114 MG/DL — HIGH (ref 70–99)
GLUCOSE BLDC GLUCOMTR-MCNC: 117 MG/DL — HIGH (ref 70–99)
GLUCOSE BLDC GLUCOMTR-MCNC: 118 MG/DL — HIGH (ref 70–99)
GLUCOSE BLDC GLUCOMTR-MCNC: 161 MG/DL — HIGH (ref 70–99)
GLUCOSE SERPL-MCNC: 128 MG/DL — HIGH (ref 70–99)
HCT VFR BLD CALC: 34.1 % — LOW (ref 34.5–45)
HGB BLD-MCNC: 10.9 G/DL — LOW (ref 11.5–15.5)
MAGNESIUM SERPL-MCNC: 2 MG/DL — SIGNIFICANT CHANGE UP (ref 1.6–2.6)
MCHC RBC-ENTMCNC: 30 PG — SIGNIFICANT CHANGE UP (ref 27–34)
MCHC RBC-ENTMCNC: 32 GM/DL — SIGNIFICANT CHANGE UP (ref 32–36)
MCV RBC AUTO: 93.9 FL — SIGNIFICANT CHANGE UP (ref 80–100)
NRBC # BLD: 0 /100 WBCS — SIGNIFICANT CHANGE UP (ref 0–0)
PHOSPHATE SERPL-MCNC: 2.5 MG/DL — SIGNIFICANT CHANGE UP (ref 2.5–4.5)
PLATELET # BLD AUTO: 476 K/UL — HIGH (ref 150–400)
POTASSIUM SERPL-MCNC: 4.6 MMOL/L — SIGNIFICANT CHANGE UP (ref 3.5–5.3)
POTASSIUM SERPL-SCNC: 4.6 MMOL/L — SIGNIFICANT CHANGE UP (ref 3.5–5.3)
RBC # BLD: 3.63 M/UL — LOW (ref 3.8–5.2)
RBC # FLD: 14.6 % — HIGH (ref 10.3–14.5)
SODIUM SERPL-SCNC: 138 MMOL/L — SIGNIFICANT CHANGE UP (ref 135–145)
WBC # BLD: 21.6 K/UL — HIGH (ref 3.8–10.5)
WBC # FLD AUTO: 21.6 K/UL — HIGH (ref 3.8–10.5)

## 2022-10-28 PROCEDURE — 99232 SBSQ HOSP IP/OBS MODERATE 35: CPT

## 2022-10-28 RX ORDER — ALBUTEROL 90 UG/1
2 AEROSOL, METERED ORAL EVERY 6 HOURS
Refills: 0 | Status: DISCONTINUED | OUTPATIENT
Start: 2022-10-28 | End: 2022-10-31

## 2022-10-28 RX ORDER — ENOXAPARIN SODIUM 100 MG/ML
70 INJECTION SUBCUTANEOUS EVERY 12 HOURS
Refills: 0 | Status: DISCONTINUED | OUTPATIENT
Start: 2022-10-28 | End: 2022-10-30

## 2022-10-28 RX ADMIN — Medication 1000 MILLIGRAM(S): at 23:30

## 2022-10-28 RX ADMIN — Medication 12.5 MILLIGRAM(S): at 05:20

## 2022-10-28 RX ADMIN — GABAPENTIN 100 MILLIGRAM(S): 400 CAPSULE ORAL at 05:20

## 2022-10-28 RX ADMIN — LIDOCAINE 1 PATCH: 4 CREAM TOPICAL at 11:40

## 2022-10-28 RX ADMIN — Medication 1000 MILLIGRAM(S): at 10:44

## 2022-10-28 RX ADMIN — LIDOCAINE 1 PATCH: 4 CREAM TOPICAL at 19:40

## 2022-10-28 RX ADMIN — Medication 2: at 11:42

## 2022-10-28 RX ADMIN — Medication 1000 MILLIGRAM(S): at 22:24

## 2022-10-28 RX ADMIN — LIDOCAINE 1 PATCH: 4 CREAM TOPICAL at 00:03

## 2022-10-28 RX ADMIN — Medication 1000 MILLIGRAM(S): at 00:50

## 2022-10-28 RX ADMIN — Medication 1000 MILLIGRAM(S): at 02:04

## 2022-10-28 RX ADMIN — Medication 1000 MILLIGRAM(S): at 12:13

## 2022-10-28 NOTE — PROGRESS NOTE ADULT - PROBLEM SELECTOR PLAN 7
Pt febrile 10/27, UCx and BCx collected  Pt accepted to [facility], however will need total 10 day quarantine. (d/c 10/29).   DC pending auth and cultures

## 2022-10-28 NOTE — PROGRESS NOTE ADULT - PROBLEM SELECTOR PLAN 1
S/p fall at home   CT noted above  Ortho recs conservative management  Continue pain control per pain mgmt  PT recs IRIS   Pain mgmt following  Recommendations appreciated

## 2022-10-28 NOTE — PROGRESS NOTE ADULT - PROBLEM SELECTOR PLAN 3
Controlled  Pt takes Metoprolol 12.5 mg BID at home  Continue Metoprolol QD with parameters  Consider increasing to home regimen when SBP > 140 x 2 consecutive readings

## 2022-10-28 NOTE — PROGRESS NOTE ADULT - SUBJECTIVE AND OBJECTIVE BOX
HPI:  82 year old female, from home, ambulates with cane, PMH HTN, HLD, DM, prior brain surgery (benign meningioma), prior cerebellar CVA, presents to the ED after fall at home. Pt states that 2 nights ago, she was walking to her bedroom and felt as if the distance was further. She states it was dark and she fell. Pt was not able to get up on her own. She states she felt vertigo, "the room spinning" after she fell and is not sure if she lost consciousness or not. She states she has 6/10 pain in her right shoulder. Her neighbor called EMS the next day when she checked in on her. Pt states that she recently traveled back from [place] a couple weeks ago. She also recently stopped taking Ozempic 1 week ago due to symptoms of nausea and diarrhea which she attributed to the medication. Pt found to be COVID+ in the ED. She states she has post-nasal drip and minor chills but otherwise denies headache, fever, cough, shortness of breath, sore throat, chest pain, palpitations, abdominal pain, diarrhea, constipation or dysuria.    In ED: vitals /69, HR 96, Temp 98.1F, 98% on RA  CT Head and Cervical Spine shows no acute intracranial CT abnormality. No evidence of acute cervical spine fracture or traumatic malalignment, within the limitations of motion artifact. Hemorrhage in the right axillary region, secondary to displaced proximal humeral fracture demonstrated on the shoulder radiographs.  s/p 500cc IVF NS bolus in ED (19 Oct 2022 09:24)      OVERNIGHT EVENTS:    No new overnight events.  Seen and examined at bedside.     REVIEW OF SYSTEMS:      CONSTITUTIONAL: No fever  EYES: no acute visual disturbances  NECK: No pain or stiffness  RESPIRATORY: No cough; No shortness of breath  CARDIOVASCULAR: No chest pain, no palpitations  GASTROINTESTINAL: No pain. No nausea, vomiting or diarrhea   NEUROLOGICAL: No headache or numbness, no tremors  MUSCULOSKELETAL: No joint pain, no muscle pain  GENITOURINARY: no dysuria, no frequency, no hesitancy  PSYCHIATRY: no depression, no anxiety  ALL OTHER  ROS negative        Vital Signs Last 24 Hrs  T(C): 37 (28 Oct 2022 11:53), Max: 37.6 (27 Oct 2022 14:09)  T(F): 98.6 (28 Oct 2022 11:53), Max: 99.6 (27 Oct 2022 14:09)  HR: 86 (28 Oct 2022 12:10) (78 - 88)  BP: 113/46 (28 Oct 2022 11:53) (113/46 - 149/60)  BP(mean): --  RR: 18 (28 Oct 2022 11:53) (18 - 18)  SpO2: 91% (28 Oct 2022 12:10) (91% - 97%)    Parameters below as of 28 Oct 2022 12:10  Patient On (Oxygen Delivery Method): room air        ________________________________________________  PHYSICAL EXAM:    GENERAL: NAD  HEENT: Normocephalic; conjunctivae and sclerae clear;  NECK : supple, no JVD  CHEST/LUNG: Clear to auscultation; Nonlabored  HEART: S1 S2  regular  ABDOMEN: Soft, Nontender, Nondistended; Bowel sounds present  EXTREMITIES: no cyanosis; no LE edema; no calf tenderness  NERVOUS SYSTEM:  Alert; no new deficits  SKIN: warm and dry; No rashes or lesions    _________________________________________________  CURRENT MEDICATIONS:    MEDICATIONS  (STANDING):  atorvastatin 40 milliGRAM(s) Oral at bedtime  enoxaparin Injectable 40 milliGRAM(s) SubCutaneous every 12 hours  gabapentin 100 milliGRAM(s) Oral two times a day  guaiFENesin  milliGRAM(s) Oral every 12 hours  insulin lispro (ADMELOG) corrective regimen sliding scale   SubCutaneous three times a day before meals  insulin lispro (ADMELOG) corrective regimen sliding scale   SubCutaneous at bedtime  lidocaine   4% Patch 1 Patch Transdermal daily  metoprolol succinate ER 12.5 milliGRAM(s) Oral daily  pantoprazole    Tablet 40 milliGRAM(s) Oral before breakfast  senna 2 Tablet(s) Oral at bedtime    MEDICATIONS  (PRN):  acetaminophen     Tablet .. 1000 milliGRAM(s) Oral every 8 hours PRN Moderate Pain (4 - 6)  ALBUTerol    90 MICROgram(s) HFA Inhaler 2 Puff(s) Inhalation every 6 hours PRN Shortness of Breath and/or Wheezing  aluminum hydroxide/magnesium hydroxide/simethicone Suspension 30 milliLiter(s) Oral every 4 hours PRN Dyspepsia  magnesium hydroxide Suspension 30 milliLiter(s) Oral daily PRN Constipation  melatonin 3 milliGRAM(s) Oral at bedtime PRN Insomnia  ondansetron Injectable 4 milliGRAM(s) IV Push every 8 hours PRN Nausea and/or Vomiting  oxyCODONE    IR 2.5 milliGRAM(s) Oral every 4 hours PRN Severe Pain (7 - 10)      __________________________________________________  LABS:                          10.1   16.30 )-----------( 459      ( 27 Oct 2022 06:32 )             32.0     10-27    142  |  107  |  14  ----------------------------<  114<H>  4.0   |  25  |  0.54    Ca    9.1      27 Oct 2022 06:32        Urinalysis Basic - ( 27 Oct 2022 19:14 )    Color: Yellow / Appearance: Clear / S.015 / pH: x  Gluc: x / Ketone: Trace  / Bili: Negative / Urobili: 4   Blood: x / Protein: 30 mg/dL / Nitrite: Negative   Leuk Esterase: Moderate / RBC: 0-2 /HPF / WBC 6-10 /HPF   Sq Epi: x / Non Sq Epi: Moderate /HPF / Bacteria: Many /HPF      CAPILLARY BLOOD GLUCOSE      POCT Blood Glucose.: 161 mg/dL (28 Oct 2022 11:19)  POCT Blood Glucose.: 114 mg/dL (28 Oct 2022 07:25)  POCT Blood Glucose.: 124 mg/dL (27 Oct 2022 21:15)  POCT Blood Glucose.: 101 mg/dL (27 Oct 2022 16:44)      __________________________________________________  RADIOLOGY & ADDITIONAL TESTS:    Imaging Personally Reviewed:  YES    < from: Xray Chest 1 View- PORTABLE-Routine (Xray Chest 1 View- PORTABLE-Routine .) (10.26.22 @ 17:46) >  IMPRESSION:  No active pulmonary disease.    < end of copied text >    < from: CT Cervical Spine No Cont (10.19.22 @ 06:30) >  IMPRESSION:  1. No acute intracranial CT abnormality.  2. No evidence ofacute cervical spine fracture or traumatic   malalignment, within the limitations of motion artifact.  3. Hemorrhage in the right axillary region, secondary to displaced   proximal humeral fracture demonstrated on the shoulder radiographs.    < end of copied text >    Consultant(s) Notes Reviewed:   YES     Plan of care was discussed with patient and /or primary care giver; all questions and concerns were addressed and care was aligned with patient's wishes.    Plan discussed with attending and consulting physicians.

## 2022-10-28 NOTE — PROGRESS NOTE ADULT - NS ATTEND AMEND GEN_ALL_CORE FT
Patient is setting in bed.  c/o persistent cough, no SOB    Alert, cooperative woman in NAD  Vital Signs Last 24 Hrs  T(C): 37.8 (28 Oct 2022 18:23), Max: 37.8 (28 Oct 2022 18:23)  T(F): 100 (28 Oct 2022 18:23), Max: 100 (28 Oct 2022 18:23)  HR: 86 (28 Oct 2022 12:10) (78 - 87)  BP: 113/46 (28 Oct 2022 11:53) (113/46 - 149/60)  BP(mean): --  RR: 18 (28 Oct 2022 11:53) (18 - 18)  SpO2: 91% (28 Oct 2022 12:10) (91% - 97%)    Parameters below as of 28 Oct 2022 12:10  Patient On (Oxygen Delivery Method): room air    Rectal temp: 100    Lungs, clear  Cor, RRR  Abdomen, soft  right arm is in a sling  Neurological, intact                          10.9   21.60 )-----------( 476      ( 28 Oct 2022 12:47 )             34.1   10-28    138  |  104  |  16  ----------------------------<  128<H>  4.6   |  24  |  0.60    Ca    9.3      28 Oct 2022 12:47  Phos  2.5     10-28  Mg     2.0     10-28    < from: Xray Chest 1 View- PORTABLE-Routine (Xray Chest 1 View- PORTABLE-Routine .) (10.26.22 @ 17:46) >  White Blood Cell - Urine: 6-10 /HPF (10.27.22 @ 19:14)   IMPRESSION:  No active pulmonary disease.    < end of copied text >    IMP:  fracture of right humerus after a mechanical fall          COVID - 19 infection without hypoxia          hypertension          DM          couth, chills, leukocytosis, low grade fever, r/o bacterial infection, possibly urine, r/o pulmonary embolism  Plan: Continue analgesia and support          CT angio, chest          blood cultures in AM          therapeutic dose lovenox

## 2022-10-29 LAB
CULTURE RESULTS: SIGNIFICANT CHANGE UP
GLUCOSE BLDC GLUCOMTR-MCNC: 103 MG/DL — HIGH (ref 70–99)
GLUCOSE BLDC GLUCOMTR-MCNC: 121 MG/DL — HIGH (ref 70–99)
GLUCOSE BLDC GLUCOMTR-MCNC: 127 MG/DL — HIGH (ref 70–99)
GLUCOSE BLDC GLUCOMTR-MCNC: 146 MG/DL — HIGH (ref 70–99)
SPECIMEN SOURCE: SIGNIFICANT CHANGE UP

## 2022-10-29 PROCEDURE — 71275 CT ANGIOGRAPHY CHEST: CPT | Mod: 26

## 2022-10-29 PROCEDURE — 99233 SBSQ HOSP IP/OBS HIGH 50: CPT

## 2022-10-29 RX ADMIN — SENNA PLUS 2 TABLET(S): 8.6 TABLET ORAL at 21:12

## 2022-10-29 RX ADMIN — ENOXAPARIN SODIUM 70 MILLIGRAM(S): 100 INJECTION SUBCUTANEOUS at 17:13

## 2022-10-29 RX ADMIN — Medication 1000 MILLIGRAM(S): at 12:41

## 2022-10-29 RX ADMIN — LIDOCAINE 1 PATCH: 4 CREAM TOPICAL at 12:06

## 2022-10-29 RX ADMIN — Medication 1000 MILLIGRAM(S): at 23:41

## 2022-10-29 RX ADMIN — GABAPENTIN 100 MILLIGRAM(S): 400 CAPSULE ORAL at 05:56

## 2022-10-29 RX ADMIN — LIDOCAINE 1 PATCH: 4 CREAM TOPICAL at 00:10

## 2022-10-29 RX ADMIN — ATORVASTATIN CALCIUM 40 MILLIGRAM(S): 80 TABLET, FILM COATED ORAL at 21:13

## 2022-10-29 RX ADMIN — Medication 600 MILLIGRAM(S): at 17:14

## 2022-10-29 RX ADMIN — LIDOCAINE 1 PATCH: 4 CREAM TOPICAL at 21:40

## 2022-10-29 RX ADMIN — Medication 1000 MILLIGRAM(S): at 13:00

## 2022-10-29 RX ADMIN — GABAPENTIN 100 MILLIGRAM(S): 400 CAPSULE ORAL at 17:15

## 2022-10-29 NOTE — PROGRESS NOTE ADULT - NS ATTEND AMEND GEN_ALL_CORE FT
Patient is setting in bed.  Eager to go to rehab.  Asks to see all of her blood results.  Declined blood draw today.     Alert, cooperative woman in NAD  Vital Signs Last 24 Hrs  T(C): 36.3 (29 Oct 2022 11:53), Max: 37.1 (28 Oct 2022 21:40)  T(F): 97.4 (29 Oct 2022 11:53), Max: 98.8 (28 Oct 2022 21:40)  HR: 88 (29 Oct 2022 11:53) (82 - 88)  BP: 127/47 (29 Oct 2022 11:53) (124/51 - 167/54)  BP(mean): --  RR: 18 (29 Oct 2022 11:53) (18 - 19)  SpO2: 97% (29 Oct 2022 11:53) (94% - 98%)    Parameters below as of 29 Oct 2022 11:53  Patient On (Oxygen Delivery Method): room air    Lungs, clear  Cor, RRR  Abdomen, soft  right arm is in a sling  Neurological, intact                          10.9   21.60 )-----------( 476      ( 28 Oct 2022 12:47 )             34.1   10-28    138  |  104  |  16  ----------------------------<  128<H>  4.6   |  24  |  0.60    Ca    9.3      28 Oct 2022 12:47  Phos  2.5     10-28  Mg     2.0     10-28    < from: Xray Chest 1 View- PORTABLE-Routine (Xray Chest 1 View- PORTABLE-Routine .) (10.26.22 @ 17:46) >  White Blood Cell - Urine: 6-10 /HPF (10.27.22 @ 19:14)   IMPRESSION:  No active pulmonary disease.    >=3 organisms. Probable collection contamination. (10.27.22 @ 19:14)   < from: CT Angio Chest PE Protocol w/ IV Cont (10.29.22 @ 19:10) >    IMPRESSION:  No evidence of acute pulmonary embolism.    < end of copied text >  IMP:  fracture of right humerus after a mechanical fall          COVID - 19 infection without hypoxia          hypertension          DM          c/o cough.  No evidence of PE or pneumonia  Plan: Continue analgesia and support          Repeat CBC in AM, if patient allows.           prophylactic lovenox, only.           Likely discharge to [facility] on Monday

## 2022-10-29 NOTE — PROGRESS NOTE ADULT - SUBJECTIVE AND OBJECTIVE BOX
MEDICAL ATTENDING NOTE  Patient is a 82y old  Female who presents with a chief complaint of fall, right humerus fracture (28 Oct 2022 12:32)      HPI:  82 year old female, from home, ambulates with cane, PMH HTN, HLD, DM, prior brain surgery (benign meningioma), prior cerebellar CVA, presents to the ED after fall at home. Pt states that 2 nights ago, she was walking to her bedroom and felt as if the distance was further. She states it was dark and she fell. Pt was not able to get up on her own. She states she felt vertigo, "the room spinning" after she fell and is not sure if she lost consciousness or not. She states she has 6/10 pain in her right shoulder. Her neighbor called EMS the next day when she checked in on her. Pt states that she recently traveled back from [facility] a couple weeks ago. She also recently stopped taking Ozempic 1 week ago due to symptoms of nausea and diarrhea which she attributed to the medication. Pt found to be COVID+ in the ED. She states she has post-nasal drip and minor chills but otherwise denies headache, fever, cough, shortness of breath, sore throat, chest pain, palpitations, abdominal pain, diarrhea, constipation or dysuria.    In ED: vitals /69, HR 96, Temp 98.1F, 98% on RA  CT Head and Cervical Spine shows no acute intracranial CT abnormality. No evidence of acute cervical spine fracture or traumatic malalignment, within the limitations of motion artifact. Hemorrhage in the right axillary region, secondary to displaced proximal humeral fracture demonstrated on the shoulder radiographs.  s/p 500cc IVF NS bolus in ED (19 Oct 2022 09:24)      INTERVAL HPI/OVERNIGHT EVENTS: no new complaints    MEDICATIONS  (STANDING):  atorvastatin 40 milliGRAM(s) Oral at bedtime  enoxaparin Injectable 70 milliGRAM(s) SubCutaneous every 12 hours  gabapentin 100 milliGRAM(s) Oral two times a day  guaiFENesin  milliGRAM(s) Oral every 12 hours  insulin lispro (ADMELOG) corrective regimen sliding scale   SubCutaneous three times a day before meals  insulin lispro (ADMELOG) corrective regimen sliding scale   SubCutaneous at bedtime  lidocaine   4% Patch 1 Patch Transdermal daily  metoprolol succinate ER 12.5 milliGRAM(s) Oral daily  pantoprazole    Tablet 40 milliGRAM(s) Oral before breakfast  senna 2 Tablet(s) Oral at bedtime    MEDICATIONS  (PRN):  acetaminophen     Tablet .. 1000 milliGRAM(s) Oral every 8 hours PRN Moderate Pain (4 - 6)  ALBUTerol    90 MICROgram(s) HFA Inhaler 2 Puff(s) Inhalation every 6 hours PRN Shortness of Breath and/or Wheezing  aluminum hydroxide/magnesium hydroxide/simethicone Suspension 30 milliLiter(s) Oral every 4 hours PRN Dyspepsia  magnesium hydroxide Suspension 30 milliLiter(s) Oral daily PRN Constipation  melatonin 3 milliGRAM(s) Oral at bedtime PRN Insomnia  ondansetron Injectable 4 milliGRAM(s) IV Push every 8 hours PRN Nausea and/or Vomiting  oxyCODONE    IR 2.5 milliGRAM(s) Oral every 4 hours PRN Severe Pain (7 - 10)      __________________________________________________  REVIEW OF SYSTEMS:    CONSTITUTIONAL: No fever,   EYES: no acute visual disturbances  NECK: No pain or stiffness  RESPIRATORY: No cough; No shortness of breath  CARDIOVASCULAR: No chest pain, no palpitations  GASTROINTESTINAL: No pain. No nausea or vomiting; No diarrhea   NEUROLOGICAL: No headache or numbness, no tremors  MUSCULOSKELETAL: No joint pain, no muscle pain  GENITOURINARY: no dysuria, no frequency, no hesitancy  PSYCHIATRY: no depression , no anxiety  ALL OTHER  ROS negative        Vital Signs Last 24 Hrs  T(C): 36.3 (29 Oct 2022 11:53), Max: 37.1 (28 Oct 2022 21:40)  T(F): 97.4 (29 Oct 2022 11:53), Max: 98.8 (28 Oct 2022 21:40)  HR: 88 (29 Oct 2022 11:53) (82 - 88)  BP: 127/47 (29 Oct 2022 11:53) (124/51 - 167/54)  BP(mean): --  RR: 18 (29 Oct 2022 11:53) (18 - 19)  SpO2: 97% (29 Oct 2022 11:53) (94% - 98%)    Parameters below as of 29 Oct 2022 11:53  Patient On (Oxygen Delivery Method): room air        ________________________________________________  PHYSICAL EXAM:  GENERAL: NAD  HEENT: Normocephalic;  conjunctivae and sclerae clear; moist mucous membranes;   NECK : supple  CHEST/LUNG: Clear to auscultation bilaterally with good air entry   HEART: S1 S2  regular; no murmurs, gallops or rubs  ABDOMEN: Soft, Nontender, Nondistended; Bowel sounds present  EXTREMITIES: no cyanosis; no edema; no calf tenderness  SKIN: warm and dry; no rash  NERVOUS SYSTEM:  Awake and alert; Oriented  to place, person and time ; no new deficits    _________________________________________________  LABS:                        10.9   21.60 )-----------( 476      ( 28 Oct 2022 12:47 )             34.1     10-28    138  |  104  |  16  ----------------------------<  128<H>  4.6   |  24  |  0.60    Ca    9.3      28 Oct 2022 12:47  Phos  2.5     10-28  Mg     2.0     10-28          CAPILLARY BLOOD GLUCOSE      POCT Blood Glucose.: 103 mg/dL (29 Oct 2022 17:02)  POCT Blood Glucose.: 121 mg/dL (29 Oct 2022 11:31)  POCT Blood Glucose.: 127 mg/dL (29 Oct 2022 07:37)  POCT Blood Glucose.: 118 mg/dL (28 Oct 2022 21:11)

## 2022-10-30 LAB
GLUCOSE BLDC GLUCOMTR-MCNC: 106 MG/DL — HIGH (ref 70–99)
GLUCOSE BLDC GLUCOMTR-MCNC: 138 MG/DL — HIGH (ref 70–99)
GLUCOSE BLDC GLUCOMTR-MCNC: 95 MG/DL — SIGNIFICANT CHANGE UP (ref 70–99)
MRSA PCR RESULT.: SIGNIFICANT CHANGE UP
S AUREUS DNA NOSE QL NAA+PROBE: SIGNIFICANT CHANGE UP
SARS-COV-2 RNA SPEC QL NAA+PROBE: SIGNIFICANT CHANGE UP

## 2022-10-30 PROCEDURE — 99233 SBSQ HOSP IP/OBS HIGH 50: CPT

## 2022-10-30 RX ORDER — ENOXAPARIN SODIUM 100 MG/ML
40 INJECTION SUBCUTANEOUS EVERY 24 HOURS
Refills: 0 | Status: DISCONTINUED | OUTPATIENT
Start: 2022-10-30 | End: 2022-10-31

## 2022-10-30 RX ADMIN — LIDOCAINE 1 PATCH: 4 CREAM TOPICAL at 05:27

## 2022-10-30 RX ADMIN — Medication 1000 MILLIGRAM(S): at 01:34

## 2022-10-30 RX ADMIN — Medication 1000 MILLIGRAM(S): at 06:43

## 2022-10-30 RX ADMIN — ENOXAPARIN SODIUM 70 MILLIGRAM(S): 100 INJECTION SUBCUTANEOUS at 05:55

## 2022-10-30 RX ADMIN — ENOXAPARIN SODIUM 40 MILLIGRAM(S): 100 INJECTION SUBCUTANEOUS at 17:46

## 2022-10-30 RX ADMIN — Medication 1000 MILLIGRAM(S): at 22:38

## 2022-10-30 RX ADMIN — Medication 600 MILLIGRAM(S): at 23:20

## 2022-10-30 RX ADMIN — Medication 600 MILLIGRAM(S): at 05:55

## 2022-10-30 RX ADMIN — LIDOCAINE 1 PATCH: 4 CREAM TOPICAL at 12:42

## 2022-10-30 RX ADMIN — Medication 1000 MILLIGRAM(S): at 21:00

## 2022-10-30 RX ADMIN — Medication 1000 MILLIGRAM(S): at 07:24

## 2022-10-30 RX ADMIN — ATORVASTATIN CALCIUM 40 MILLIGRAM(S): 80 TABLET, FILM COATED ORAL at 22:37

## 2022-10-30 NOTE — PROGRESS NOTE ADULT - PROBLEM SELECTOR PROBLEM 4
HTN (hypertension)
HLD (hyperlipidemia)
HTN (hypertension)
HTN (hypertension)
HLD (hyperlipidemia)

## 2022-10-30 NOTE — PROGRESS NOTE ADULT - PROBLEM SELECTOR PROBLEM 1
Fracture of humerus
Fracture of humerus
Acute pain of right shoulder
Acute pain of right shoulder
Fracture of humerus
Acute pain of right shoulder
Fracture of humerus

## 2022-10-30 NOTE — PROGRESS NOTE ADULT - PROBLEM SELECTOR PROBLEM 5
DM (diabetes mellitus)
HLD (hyperlipidemia)
DM (diabetes mellitus)
HLD (hyperlipidemia)
DM (diabetes mellitus)
HLD (hyperlipidemia)
DM (diabetes mellitus)

## 2022-10-30 NOTE — PROGRESS NOTE ADULT - PROBLEM SELECTOR PLAN 2
-incidental finding, asymptomatic   -CXR unremarkable   -supportive measures   -contact and airborne isolation
Mild pain   - Non-pharmacological pain treatment recommendations  - Warm/ Cool packs PRN   - Repositioning, imagery, relaxation, distraction.  - Physical therapy OOB if no contraindications   Recommendations discussed with primary team MD Mayorga and GLENIS Hernandez.
-incidental finding, asymptomatic   -CXR unremarkable   -supportive measures   -contact and airborne isolation
Asymptomatic  SpO2 95% on RA  CXR noted above  Continue supportive measures   Continue with isolation precaution  Continue with PRN O2 support  Continue with supportive care  Follow up lab results
Asymptomatic  SpO2 95% on RA  CXR noted above  Continue supportive measures   Continue with isolation precaution  Continue with PRN O2 support  Continue with supportive care  Follow up lab results
- incidental finding, asymptomatic   - CXR unremarkable   - supportive measures   - contact and airborne isolation  - leukocytosis  - Temp 99.6  - f/u UA, Urine Cx, Blood culture
Mild pain   - Non-pharmacological pain treatment recommendations  - Warm/ Cool packs PRN   - Repositioning, imagery, relaxation, distraction.  - Physical therapy OOB if no contraindications   Recommendations discussed with primary team MD Mayorga and GLENIS Hernandez.
Mild pain   - Non-pharmacological pain treatment recommendations  - Warm/ Cool packs PRN   - Repositioning, imagery, relaxation, distraction.  - Physical therapy OOB if no contraindications   Recommendations discussed with primary team.
-incidental finding, asymptomatic   -CXR unremarkable   -supportive measures   -contact and airborne isolation
-incidental finding, asymptomatic   -CXR unremarkable   -supportive measures   -contact and airborne isolation  - leukocytosis  - afbrile
Asymptomatic  SpO2 95% on RA  CXR noted above  Continue supportive measures   Continue with isolation precaution  Continue with PRN O2 support  Continue with supportive care  Follow up lab results
-incidental finding, asymptomatic   -CXR unremarkable   -supportive measures   -contact and airborne isolation

## 2022-10-30 NOTE — PROGRESS NOTE ADULT - NS ATTEND AMEND GEN_ALL_CORE FT
Patient is setting in bed.  Eager to go to rehab.  Asks to see all of her CT angio results.      Alert, cooperative woman in NAD    vs, as above    Lungs, clear  Cor, RRR  Abdomen, soft  right arm is in a sling  Neurological, intact                 < from: Xray Chest 1 View- PORTABLE-Routine (Xray Chest 1 View- PORTABLE-Routine .) (10.26.22 @ 17:46) >  White Blood Cell - Urine: 6-10 /HPF (10.27.22 @ 19:14)   IMPRESSION:  No active pulmonary disease.    >=3 organisms. Probable collection contamination. (10.27.22 @ 19:14)   < from: CT Angio Chest PE Protocol w/ IV Cont (10.29.22 @ 19:10) >    IMPRESSION:  No evidence of acute pulmonary embolism.  COVID-19 PCR: NotDetec: EUA/IVD   < end of copied text >  IMP:  fracture of right humerus after a mechanical fall          COVID - 19 infection without hypoxia, post isolation period, repeat COVID test is negative.           hypertension, controlled          DM, well controlled          c/o cough.  No evidence of PE or pneumonia  Plan: Continue analgesia and support          Worked with PT today          Discontinue isolation, move from COVID room, if available          Repeat CBC in AM, if patient allows.           prophylactic lovenox, only.           Likely discharge to [facility] on Monday

## 2022-10-30 NOTE — PROGRESS NOTE ADULT - SUBJECTIVE AND OBJECTIVE BOX
36.6   MEDICAL ATTENDING NOTE  Patient is a 82y old  Female who presents with a chief complaint of fall, right humerus fracture (28 Oct 2022 12:32)      HPI:  82 year old female, from home, ambulates with cane, PMH HTN, HLD, DM, prior brain surgery (benign meningioma), prior cerebellar CVA, presents to the ED after fall at home. Pt states that 2 nights ago, she was walking to her bedroom and felt as if the distance was further. She states it was dark and she fell. Pt was not able to get up on her own. She states she felt vertigo, "the room spinning" after she fell and is not sure if she lost consciousness or not. She states she has 6/10 pain in her right shoulder. Her neighbor called EMS the next day when she checked in on her. Pt states that she recently traveled back from [facility] a couple weeks ago. She also recently stopped taking Ozempic 1 week ago due to symptoms of nausea and diarrhea which she attributed to the medication. Pt found to be COVID+ in the ED. She states she has post-nasal drip and minor chills but otherwise denies headache, fever, cough, shortness of breath, sore throat, chest pain, palpitations, abdominal pain, diarrhea, constipation or dysuria.    In ED: vitals /69, HR 96, Temp 98.1F, 98% on RA  CT Head and Cervical Spine shows no acute intracranial CT abnormality. No evidence of acute cervical spine fracture or traumatic malalignment, within the limitations of motion artifact. Hemorrhage in the right axillary region, secondary to displaced proximal humeral fracture demonstrated on the shoulder radiographs.  s/p 500cc IVF NS bolus in ED (19 Oct 2022 09:24)      INTERVAL HPI/OVERNIGHT EVENTS: no new complaints    MEDICATIONS  (STANDING):  atorvastatin 40 milliGRAM(s) Oral at bedtime  enoxaparin Injectable 70 milliGRAM(s) SubCutaneous every 12 hours  gabapentin 100 milliGRAM(s) Oral two times a day  guaiFENesin  milliGRAM(s) Oral every 12 hours  insulin lispro (ADMELOG) corrective regimen sliding scale   SubCutaneous three times a day before meals  insulin lispro (ADMELOG) corrective regimen sliding scale   SubCutaneous at bedtime  lidocaine   4% Patch 1 Patch Transdermal daily  metoprolol succinate ER 12.5 milliGRAM(s) Oral daily  pantoprazole    Tablet 40 milliGRAM(s) Oral before breakfast  senna 2 Tablet(s) Oral at bedtime    MEDICATIONS  (PRN):  acetaminophen     Tablet .. 1000 milliGRAM(s) Oral every 8 hours PRN Moderate Pain (4 - 6)  ALBUTerol    90 MICROgram(s) HFA Inhaler 2 Puff(s) Inhalation every 6 hours PRN Shortness of Breath and/or Wheezing  aluminum hydroxide/magnesium hydroxide/simethicone Suspension 30 milliLiter(s) Oral every 4 hours PRN Dyspepsia  magnesium hydroxide Suspension 30 milliLiter(s) Oral daily PRN Constipation  melatonin 3 milliGRAM(s) Oral at bedtime PRN Insomnia  ondansetron Injectable 4 milliGRAM(s) IV Push every 8 hours PRN Nausea and/or Vomiting  oxyCODONE    IR 2.5 milliGRAM(s) Oral every 4 hours PRN Severe Pain (7 - 10)      __________________________________________________  REVIEW OF SYSTEMS:    CONSTITUTIONAL: No fever,   EYES: no acute visual disturbances  NECK: No pain or stiffness  RESPIRATORY:  still c/o cough; No shortness of breath  CARDIOVASCULAR: No chest pain, no palpitations  GASTROINTESTINAL: No pain. No nausea or vomiting; No diarrhea   NEUROLOGICAL: No headache or numbness, no tremors  MUSCULOSKELETAL: No joint pain, no muscle pain  GENITOURINARY: no dysuria, no frequency, no hesitancy  PSYCHIATRY: no depression , no anxiety  ALL OTHER  ROS negative      Vital Signs Last 24 Hrs  T(C): 36.5 (30 Oct 2022 13:30), Max: 36.5 (29 Oct 2022 20:40)  T(F): 97.7 (30 Oct 2022 13:30), Max: 97.7 (29 Oct 2022 20:40)  HR: 90 (30 Oct 2022 13:40) (89 - 91)  BP: 117/50 (30 Oct 2022 13:40) (117/50 - 138/58)  BP(mean): --  RR: 18 (30 Oct 2022 13:30) (18 - 18)  SpO2: 95% (30 Oct 2022 13:40) (94% - 95%)    Parameters below as of 30 Oct 2022 13:40  Patient On (Oxygen Delivery Method): room air    ________________________________________________  PHYSICAL EXAM:  GENERAL: NAD  HEENT: Normocephalic;  conjunctivae and sclerae clear; moist mucous membranes;   NECK : supple  CHEST/LUNG: Clear to auscultation bilaterally with good air entry   HEART: S1 S2  regular; no murmurs, gallops or rubs  ABDOMEN: Soft, Nontender, Nondistended; Bowel sounds present  EXTREMITIES: no cyanosis; no edema; no calf tenderness  SKIN: warm and dry; no rash  NERVOUS SYSTEM:  Awake and alert; Oriented  to place, person and time ; no new deficits    _________________________________________________  LABS:             No new labs today      CAPILLARY BLOOD GLUCOSE      POCT Blood Glucose.: 95 mg/dL (30 Oct 2022 11:54)  POCT Blood Glucose.: 146 mg/dL (29 Oct 2022 21:00)  POCT Blood Glucose.: 103 mg/dL (29 Oct 2022 17:02)

## 2022-10-30 NOTE — PROGRESS NOTE ADULT - PROBLEM SELECTOR PROBLEM 6
Prophylactic measure
DM (diabetes mellitus)
DM (diabetes mellitus)
Prophylactic measure
DM (diabetes mellitus)
Prophylactic measure
Prophylactic measure

## 2022-10-30 NOTE — PROGRESS NOTE ADULT - REASON FOR ADMISSION
fall, right humerus fracture

## 2022-10-30 NOTE — PROGRESS NOTE ADULT - PROBLEM SELECTOR PLAN 4
Continue atorvastatin
-continue atorvastatin
-cont home dose Statin
-cont home dose Statin
Continue atorvastatin
-cont home dose Statin
-cont home dose Statin
-continue atorvastatin
Continue atorvastatin

## 2022-10-30 NOTE — CHART NOTE - NSCHARTNOTEFT_GEN_A_CORE
EVENT: Notified by RN, pt is requesting Mucinex, she refused dose at 1800.     HPI:  82 year old female, from home, ambulates with cane, PMH HTN, HLD, DM, prior brain surgery (benign meningioma), prior cerebellar CVA, presents to the ED after fall at home. Found to be COVID + and X-Ray shows acute displaced angulated right humeral neck fracture. Admitted to medicine for syncope workup and management of humeral fracture. Ortho consulted and recommended conservative management. pain management following.  pt reccs [facility], pt accepted to [facility], however will need total 10 day quarantine (10/29). pending auth.    SUBJECTIVE: Pt is A&Ox3, with cough--requesting Mucinex which she refused earlier around 1800    OBJECTIVE:  Vital Signs Last 24 Hrs  T(C): 36.6 (30 Oct 2022 21:29), Max: 36.6 (30 Oct 2022 21:29)  T(F): 97.9 (30 Oct 2022 21:29), Max: 97.9 (30 Oct 2022 21:29)  HR: 92 (30 Oct 2022 21:29) (89 - 92)  BP: 120/42 (30 Oct 2022 21:29) (117/50 - 138/58)  BP(mean): --  RR: 17 (30 Oct 2022 21:29) (17 - 18)  SpO2: 95% (30 Oct 2022 21:29) (94% - 95%)    Parameters below as of 30 Oct 2022 21:29  Patient On (Oxygen Delivery Method): room air      PHYSICAL EXAM:  Neuro: Awake and alert, oriented to person, place, and time  Cardiovascular: + S1, S2, no murmurs, rubs, or bruits  Respiratory: clear to auscultation bilaterally with good air entry, +cough  GI: Abdomen soft, non-tender, bowel sounds present   : Non distended;   Skin: warm and dry; no rash      LABS:      EKG:   IMAGING:    ASSESSMENT/PROBLEM: Cough likely r/t COVID - 19 infection now post isolation period      PLAN:     -Mucinex 600 mg PO ER x 1 dose now, 0600 dose to be rescheduled. Discussed with RN.   -Continue current/supportive measures    FOLLOW UP / RESULT:    -Plan as above

## 2022-10-30 NOTE — PROGRESS NOTE ADULT - PROVIDER SPECIALTY LIST ADULT
Internal Medicine
Pain Medicine
Hospitalist
Internal Medicine
Hospitalist
Pain Medicine
Pain Medicine
Internal Medicine

## 2022-10-30 NOTE — PROGRESS NOTE ADULT - PROBLEM SELECTOR PROBLEM 3
HTN (hypertension)
Fall
HTN (hypertension)
HTN (hypertension)
Fall
Fall
HTN (hypertension)

## 2022-10-30 NOTE — PROGRESS NOTE ADULT - PROBLEM SELECTOR PLAN 7
Pt febrile 10/27, UCx and BCx collected  Pt accepted to [facility], authorization is valid until 11/1/2022

## 2022-10-30 NOTE — PROGRESS NOTE ADULT - PROBLEM SELECTOR PLAN 6
DVT PPX: Lovenox  GI PPX: PPI
-dvt ppx: Lovenox  -gi ppx: PPI
DVT PPX: Lovenox  GI PPX: PPI
-dvt ppx: Lovenox  -gi ppx: PPI
DVT PPX: Lovenox  GI PPX: PPI

## 2022-10-30 NOTE — PROGRESS NOTE ADULT - PROBLEM SELECTOR PROBLEM 2
2019 novel coronavirus disease (COVID-19)
2019 novel coronavirus disease (COVID-19)
Fracture of humerus
2019 novel coronavirus disease (COVID-19)
Fracture of humerus
Fracture of humerus
2019 novel coronavirus disease (COVID-19)

## 2022-10-30 NOTE — PROGRESS NOTE ADULT - PROBLEM SELECTOR PLAN 5
-on metformin 500mg BID & Ozempic 1mg SQ once weekly at home   -cont ISSC   -diabetic diet
Controlled  Pt takes Metformin 500mg BID & Ozempic 1mg SQ once weekly at home   Continue sliding scale insulin coverage  Continue glucose monitoring  Continue low carb diet
-on metformin 500mg BID & Ozempic 1mg SQ once weekly at home   -cont ISSC   -diabetic diet
Controlled  Pt takes Metformin 500mg BID & Ozempic 1mg SQ once weekly at home   Continue sliding scale insulin coverage  Continue glucose monitoring  Continue low carb diet
-on metformin 500mg BID & Ozempic 1mg SQ once weekly at home   -cont ISSC   -diabetic diet
Controlled  Pt takes Metformin 500mg BID & Ozempic 1mg SQ once weekly at home   Continue sliding scale insulin coverage  Continue glucose monitoring  Continue low carb diet

## 2022-10-31 ENCOUNTER — TRANSCRIPTION ENCOUNTER (OUTPATIENT)
Age: 82
End: 2022-10-31

## 2022-10-31 VITALS
HEART RATE: 89 BPM | OXYGEN SATURATION: 93 % | SYSTOLIC BLOOD PRESSURE: 127 MMHG | DIASTOLIC BLOOD PRESSURE: 55 MMHG | RESPIRATION RATE: 16 BRPM | TEMPERATURE: 97 F

## 2022-10-31 LAB
GLUCOSE BLDC GLUCOMTR-MCNC: 135 MG/DL — HIGH (ref 70–99)
GLUCOSE BLDC GLUCOMTR-MCNC: 140 MG/DL — HIGH (ref 70–99)

## 2022-10-31 PROCEDURE — 99238 HOSP IP/OBS DSCHRG MGMT 30/<: CPT

## 2022-10-31 RX ORDER — OXYCODONE HYDROCHLORIDE 5 MG/1
2.5 TABLET ORAL
Qty: 0 | Refills: 0 | DISCHARGE
Start: 2022-10-31

## 2022-10-31 RX ORDER — ASPIRIN/CALCIUM CARB/MAGNESIUM 324 MG
1 TABLET ORAL
Qty: 30 | Refills: 0
Start: 2022-10-31 | End: 2022-11-29

## 2022-10-31 RX ORDER — GABAPENTIN 400 MG/1
1 CAPSULE ORAL
Qty: 0 | Refills: 0 | DISCHARGE
Start: 2022-10-31

## 2022-10-31 RX ORDER — ACETAMINOPHEN 500 MG
2 TABLET ORAL
Qty: 0 | Refills: 0 | DISCHARGE
Start: 2022-10-31

## 2022-10-31 RX ORDER — LIDOCAINE 4 G/100G
1 CREAM TOPICAL
Qty: 0 | Refills: 0 | DISCHARGE
Start: 2022-10-31

## 2022-10-31 RX ORDER — RIVAROXABAN 15 MG-20MG
1 KIT ORAL
Qty: 30 | Refills: 0
Start: 2022-10-31 | End: 2022-11-29

## 2022-10-31 RX ADMIN — LIDOCAINE 1 PATCH: 4 CREAM TOPICAL at 02:15

## 2022-10-31 RX ADMIN — Medication 1000 MILLIGRAM(S): at 06:45

## 2022-10-31 RX ADMIN — Medication 600 MILLIGRAM(S): at 06:01

## 2022-10-31 RX ADMIN — LIDOCAINE 1 PATCH: 4 CREAM TOPICAL at 01:00

## 2022-10-31 RX ADMIN — Medication 1000 MILLIGRAM(S): at 06:00

## 2022-10-31 RX ADMIN — GABAPENTIN 100 MILLIGRAM(S): 400 CAPSULE ORAL at 06:01

## 2022-10-31 RX ADMIN — LIDOCAINE 1 PATCH: 4 CREAM TOPICAL at 11:24

## 2022-10-31 NOTE — DISCHARGE NOTE NURSING/CASE MANAGEMENT/SOCIAL WORK - PATIENT PORTAL LINK FT
You can access the FollowMyHealth Patient Portal offered by [facility] by registering at the following website: [email or web address]. By joining [facility]’s FollowMyHealth portal, you will also be able to view your health information using other applications (apps) compatible with our system.

## 2022-11-03 LAB
CULTURE RESULTS: SIGNIFICANT CHANGE UP
SPECIMEN SOURCE: SIGNIFICANT CHANGE UP

## 2022-11-08 PROCEDURE — 87640 STAPH A DNA AMP PROBE: CPT

## 2022-11-08 PROCEDURE — 71045 X-RAY EXAM CHEST 1 VIEW: CPT

## 2022-11-08 PROCEDURE — 82728 ASSAY OF FERRITIN: CPT

## 2022-11-08 PROCEDURE — 84443 ASSAY THYROID STIM HORMONE: CPT

## 2022-11-08 PROCEDURE — 97116 GAIT TRAINING THERAPY: CPT

## 2022-11-08 PROCEDURE — 85610 PROTHROMBIN TIME: CPT

## 2022-11-08 PROCEDURE — 73060 X-RAY EXAM OF HUMERUS: CPT

## 2022-11-08 PROCEDURE — 73030 X-RAY EXAM OF SHOULDER: CPT

## 2022-11-08 PROCEDURE — 97150 GROUP THERAPEUTIC PROCEDURES: CPT

## 2022-11-08 PROCEDURE — 81001 URINALYSIS AUTO W/SCOPE: CPT

## 2022-11-08 PROCEDURE — 99285 EMERGENCY DEPT VISIT HI MDM: CPT

## 2022-11-08 PROCEDURE — 87635 SARS-COV-2 COVID-19 AMP PRB: CPT

## 2022-11-08 PROCEDURE — 97110 THERAPEUTIC EXERCISES: CPT

## 2022-11-08 PROCEDURE — 85027 COMPLETE CBC AUTOMATED: CPT

## 2022-11-08 PROCEDURE — 85730 THROMBOPLASTIN TIME PARTIAL: CPT

## 2022-11-08 PROCEDURE — 84436 ASSAY OF TOTAL THYROXINE: CPT

## 2022-11-08 PROCEDURE — 84484 ASSAY OF TROPONIN QUANT: CPT

## 2022-11-08 PROCEDURE — 36415 COLL VENOUS BLD VENIPUNCTURE: CPT

## 2022-11-08 PROCEDURE — 83036 HEMOGLOBIN GLYCOSYLATED A1C: CPT

## 2022-11-08 PROCEDURE — 85379 FIBRIN DEGRADATION QUANT: CPT

## 2022-11-08 PROCEDURE — 83735 ASSAY OF MAGNESIUM: CPT

## 2022-11-08 PROCEDURE — 87086 URINE CULTURE/COLONY COUNT: CPT

## 2022-11-08 PROCEDURE — 82550 ASSAY OF CK (CPK): CPT

## 2022-11-08 PROCEDURE — 87641 MR-STAPH DNA AMP PROBE: CPT

## 2022-11-08 PROCEDURE — 71275 CT ANGIOGRAPHY CHEST: CPT

## 2022-11-08 PROCEDURE — 85025 COMPLETE CBC W/AUTO DIFF WBC: CPT

## 2022-11-08 PROCEDURE — 86140 C-REACTIVE PROTEIN: CPT

## 2022-11-08 PROCEDURE — 70450 CT HEAD/BRAIN W/O DYE: CPT | Mod: MA

## 2022-11-08 PROCEDURE — 97161 PT EVAL LOW COMPLEX 20 MIN: CPT

## 2022-11-08 PROCEDURE — 97530 THERAPEUTIC ACTIVITIES: CPT

## 2022-11-08 PROCEDURE — 84481 FREE ASSAY (FT-3): CPT

## 2022-11-08 PROCEDURE — 93005 ELECTROCARDIOGRAM TRACING: CPT

## 2022-11-08 PROCEDURE — 87040 BLOOD CULTURE FOR BACTERIA: CPT

## 2022-11-08 PROCEDURE — 80048 BASIC METABOLIC PNL TOTAL CA: CPT

## 2022-11-08 PROCEDURE — 82962 GLUCOSE BLOOD TEST: CPT

## 2022-11-08 PROCEDURE — 72125 CT NECK SPINE W/O DYE: CPT | Mod: MA

## 2022-11-08 PROCEDURE — 83615 LACTATE (LD) (LDH) ENZYME: CPT

## 2022-11-08 PROCEDURE — 80053 COMPREHEN METABOLIC PANEL: CPT

## 2022-11-08 PROCEDURE — 84100 ASSAY OF PHOSPHORUS: CPT

## 2022-11-16 PROBLEM — I10 ESSENTIAL (PRIMARY) HYPERTENSION: Chronic | Status: ACTIVE | Noted: 2022-10-19

## 2022-11-16 PROBLEM — E11.9 TYPE 2 DIABETES MELLITUS WITHOUT COMPLICATIONS: Chronic | Status: ACTIVE | Noted: 2022-10-19

## 2022-11-16 PROBLEM — E78.5 HYPERLIPIDEMIA, UNSPECIFIED: Chronic | Status: ACTIVE | Noted: 2022-10-19

## 2022-11-21 ENCOUNTER — APPOINTMENT (OUTPATIENT)
Dept: ORTHOPEDIC SURGERY | Facility: CLINIC | Age: 82
End: 2022-11-21

## 2022-11-21 PROCEDURE — 73030 X-RAY EXAM OF SHOULDER: CPT | Mod: RT

## 2022-11-21 PROCEDURE — 99213 OFFICE O/P EST LOW 20 MIN: CPT

## 2022-11-21 NOTE — HISTORY OF PRESENT ILLNESS
[de-identified] : Pt is a RHD 83 y/o female who presents for evaluation of a right shoulder injury. She is unsure of the exact date. She reports to have fallen in her home, resulting in the fracture. With that said, she was seen at [place] ED on 10/19/22 which revealed an angulated and severely displaced surgical neck fracture of the right proximal humerus. She was placed in a sling and advised to follow up with a specialist. She is currently residing at [facility].\par \par She has a medical history of Diabetes.

## 2022-11-21 NOTE — ADDENDUM
[FreeTextEntry1] : I, Lorna Washington wrote this note acting as a scribe for Dr. Keagan Galvin on Nov 21, 2022.

## 2022-11-21 NOTE — DISCUSSION/SUMMARY
[de-identified] : The underlying pathophysiology was reviewed with the patient. XR films were reviewed with the patient. Discussed at length the nature of the patient’s condition. The right upper extremity symptoms appear secondary to surgical neck fracture of the proximal humerus. \par \par At this time, we discussed treatment options consisting of operative versus nonoperative management. I told her that in injuries of this nature, given the displacement of the fracture, I would recommend surgery. With that being said, she does have a medical history which includes Diabetes and states she does regularly live home, without help.  We discussed that without surgery, she will have limited function of the shoulder. I told her that these are definitely factors to considering in deciding on how she would like to proceed and that ultimately however the final decision. She stated she is unsure but is leaning towards continuing nonoperatively. She was provided with the information of our surgical scheduler and will call her, if and when she decides to proceed.\par In the interim, she will remain immobilized in the sling. She was instructed on gentle ROM exercises of the elbow, wrist and digits. She did inquire about if PT will help her, which I told her it likely will not aide in improving her function.\par Paperwork for [facility] was filled out with a treatment plan and recommendations. I advised use of a platform nicola-walker and gait-training PT.\par \par All questions answered, understanding verbalized. Patient in agreement with plan of care. Follow up in 4 weeks for repeat xrays if she decides to continue nonoperatively. (Otherwise, she will call the surgical scheduler to schedule ORIF, if she decides to proceed.

## 2022-11-21 NOTE — PHYSICAL EXAM
[de-identified] : Patient is WDWN, alert, and in no acute distress. Breathing is unlabored. She is grossly oriented to person, place, and time.\par \par She is accompanied by an aide from the rehab facility.\par She presents to the office in a wheelchair.\par \par Right Upper Extremity:\par She presents with the upper extremity immobilized in a sling.\par ROM to the right shoulder was not accessed given injury and pain. [de-identified] : AP, transcapula, and axillary views of the RIGHT shoulder were obtained and revealed a angulated and fully displaced fracture of the surgical neck of the humerus. \par \par ------------------------------------------------------------------------------------------------------------------------------------------------------------------------------------\par \par EXAM: XR SHOULDER COMP MIN 2V RT\par PROCEDURE DATE: 10/19/2022\par IMPRESSION:\par Three views of the right shoulder show angulated and displaced comminuted fracture of the surgical neck. The joint spaces are maintained.\par \par YOLETTE MALIN MD; Attending Interventional Radiologist\par This document has been electronically signed. Oct 21 2022 9:23AM

## 2022-11-21 NOTE — END OF VISIT
[FreeTextEntry3] : All medical record entries made by the Scribe were at my,  Dr. Keagan Galvin MD., direction and personally dictated by me on 11/21/2022. I have personally reviewed the chart and agree that the record accurately reflects my personal performance of the history, physical exam, assessment and plan.

## 2022-12-22 ENCOUNTER — APPOINTMENT (OUTPATIENT)
Dept: ORTHOPEDIC SURGERY | Facility: CLINIC | Age: 82
End: 2022-12-22

## 2022-12-22 DIAGNOSIS — S42.209A UNSPECIFIED FRACTURE OF UPPER END OF UNSPECIFIED HUMERUS, INITIAL ENCOUNTER FOR CLOSED FRACTURE: ICD-10-CM

## 2022-12-22 NOTE — PHYSICAL EXAM
[de-identified] : Patient is WDWN, alert, and in no acute distress. Breathing is unlabored. She is grossly oriented to person, place, and time.\par \par She is accompanied by an aide from the rehab facility.\par She presents to the office in a wheelchair.\par \par Right Upper Extremity:\par She presents with the upper extremity immobilized in a sling.\par ROM to the right shoulder was not accessed given injury and pain. [de-identified] : AP, transcapula, and axillary views of the RIGHT shoulder were obtained and revealed a angulated and fully displaced fracture of the surgical neck of the humerus. \par \par ------------------------------------------------------------------------------------------------------------------------------------------------------------------------------------\par \par EXAM: XR SHOULDER COMP MIN 2V RT\par PROCEDURE DATE: 10/19/2022\par IMPRESSION:\par Three views of the right shoulder show angulated and displaced comminuted fracture of the surgical neck. The joint spaces are maintained.\par \par YOLETTE MALIN MD; Attending Interventional Radiologist\par This document has been electronically signed. Oct 21 2022 9:23AM

## 2022-12-22 NOTE — DISCUSSION/SUMMARY
[de-identified] : The underlying pathophysiology was reviewed with the patient. XR films were reviewed with the patient. Discussed at length the nature of the patient’s condition. The right upper extremity symptoms appear secondary to surgical neck fracture of the proximal humerus. \par \par At this time, we discussed treatment options consisting of operative versus nonoperative management. I told her that in injuries of this nature, given the displacement of the fracture, I would recommend surgery. With that being said, she does have a medical history which includes Diabetes and states she does regularly live home, without help.  We discussed that without surgery, she will have limited function of the shoulder. I told her that these are definitely factors to considering in deciding on how she would like to proceed and that ultimately however the final decision. She stated she is unsure but is leaning towards continuing nonoperatively. She was provided with the information of our surgical scheduler and will call her, if and when she decides to proceed.\par In the interim, she will remain immobilized in the sling. She was instructed on gentle ROM exercises of the elbow, wrist and digits. She did inquire about if PT will help her, which I told her it likely will not aide in improving her function.\par Paperwork for [facility] was filled out with a treatment plan and recommendations. I advised use of a platform nicola-walker and gait-training PT.\par \par All questions answered, understanding verbalized. Patient in agreement with plan of care. Follow up in

## 2022-12-22 NOTE — HISTORY OF PRESENT ILLNESS
[de-identified] : Pt is a RHD 81 y/o female who presents for evaluation of a right shoulder injury. She is unsure of the exact date. She reports to have fallen in her home, resulting in the fracture. With that said, she was seen at [place] [facility] on 10/19/22 which revealed an angulated and severely displaced surgical neck fracture of the right proximal humerus. She was placed in a sling and advised to follow up with a specialist. She is currently residing at [facility]. She was initially seen in the office on 11/21/22 at which time I did recommend operative \par \par She has a medical history of Diabetes.

## 2022-12-22 NOTE — END OF VISIT
[FreeTextEntry3] : All medical record entries made by the Scribe were at my,  Dr. Keagan Galvin MD., direction and personally dictated by me on 12/22/2022. I have personally reviewed the chart and agree that the record accurately reflects my personal performance of the history, physical exam, assessment and plan.

## 2023-01-05 NOTE — PHYSICAL THERAPY INITIAL EVALUATION ADULT - CRITERIA FOR SKILLED THERAPEUTIC INTERVENTIONS
EKG Procedure Completed    Date/Time EKG order released: Date: 01/04/23/ Time: 1608             Order Identified as STAT or routine: STAT  If STAT time RN called Date: 01/04/23/Time: RN called on dayshift for series of stat ekg's Q3  Order in EMR confirmed: Yes  Date/Time EKG completed: Date: 01/04/23/ Time: 1958  Date/Time EKG uploaded to MUSE: Date: 01/04/23/ Time: 2003   Date/Time Copy of EKG given to RN: Date: 01/04/23/ Time: 2005   Order completed: Date: 01/04/23/Time: 2002     Sienna Suresh, RRT          
impairments found/functional limitations in following categories

## 2023-08-28 NOTE — PHYSICAL THERAPY INITIAL EVALUATION ADULT - PATIENT PROFILE REVIEW, REHAB EVAL
yes
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 Discussed injection of kenalog/ 5FU into lesion if becomes bothersome to patient. For now he would just like to watch the lesion.
Render Risk Assessment In Note?: no
Detail Level: Simple

## 2023-10-17 NOTE — ADDENDUM
[FreeTextEntry1] : I, Lorna Washington wrote this note acting as a scribe for Dr. Keagan Galvin on Dec 22, 2022.  Nostril Rim Text: The closure involved the nostril rim.

## 2024-04-23 NOTE — PATIENT PROFILE ADULT - HAS THE PATIENT RECEIVED THE INFLUENZA VACCINE THIS SEASON?
cardiac arrest   ARf ? cholangitis Skin assessment and treatment recommendation Hyponatremia/ hyperkalemia trach & peg cardiac arrest sepsis cardiac arrest GOC yes...

## 2024-05-02 NOTE — H&P ADULT - PROBLEM SELECTOR PROBLEM 6
[Eye Discharge] : eye discharge [Eye Redness] : eye redness [Negative] : Genitourinary Discharge planning issues

## 2024-11-07 NOTE — ADVANCED PRACTICE NURSE CONSULT - RECOMMEDATIONS
-Apply a foam dressing to the Coccyx area Q 72hrs PRN  -Elevate/float the patients heels using heel protectors and reposition the patient Q 2hrs using wedges or pillows States ' I am having panic attack". Denied SI/HI

## 2025-03-31 NOTE — ED PROVIDER NOTE - SECONDARY DIAGNOSIS.
Incidental finding on CT abd/pelvis March 2023. 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 Advised pancreatic CT or MRI in 2 years, needs to have completed. Agreeable to having CT ordered today.   Fracture of humeral head

## 2025-04-21 NOTE — ED ADULT NURSE NOTE - CAS DISCH ACCOMP BY
Chart reviewed. Prescription refilled per protocol.    Medication(s) Requested:     Disp Refills Start End   alendronate (FOSAMAX) 70 MG tablet 12 tablet 0 1/21/2025   Sig - Route: Take 1 tablet by mouth every 7 days. - Oral     Last office visit: 10/15/24   Next office visit: 07/23/25    ---- OV Naomi Bettencourt MD 10/15/24 ----  \"- I have prescribed alendronate 70 mg PO weekly\"    ----   Last Refill   ----   03/15/2025    [facility]